# Patient Record
Sex: MALE | Race: WHITE | NOT HISPANIC OR LATINO | Employment: FULL TIME | URBAN - NONMETROPOLITAN AREA
[De-identification: names, ages, dates, MRNs, and addresses within clinical notes are randomized per-mention and may not be internally consistent; named-entity substitution may affect disease eponyms.]

---

## 2020-08-04 ENCOUNTER — HOSPITAL ENCOUNTER (INPATIENT)
Facility: HOSPITAL | Age: 55
LOS: 4 days | Discharge: HOME | DRG: 314 | End: 2020-08-08
Attending: EMERGENCY MEDICINE | Admitting: FAMILY MEDICINE

## 2020-08-04 ENCOUNTER — APPOINTMENT (EMERGENCY)
Dept: RADIOLOGY | Facility: HOSPITAL | Age: 55
DRG: 314 | End: 2020-08-04

## 2020-08-04 DIAGNOSIS — R79.89 ABNORMAL TSH: ICD-10-CM

## 2020-08-04 DIAGNOSIS — I50.23 ACUTE ON CHRONIC SYSTOLIC CONGESTIVE HEART FAILURE (HCC): ICD-10-CM

## 2020-08-04 DIAGNOSIS — R77.8 ELEVATED TROPONIN: ICD-10-CM

## 2020-08-04 DIAGNOSIS — N17.9 AKI (ACUTE KIDNEY INJURY) (HCC): ICD-10-CM

## 2020-08-04 DIAGNOSIS — R77.8 ELEVATED TROPONIN I LEVEL: ICD-10-CM

## 2020-08-04 DIAGNOSIS — G25.81 RESTLESS LEG: ICD-10-CM

## 2020-08-04 DIAGNOSIS — I50.21 ACUTE SYSTOLIC CONGESTIVE HEART FAILURE (HCC): Primary | ICD-10-CM

## 2020-08-04 DIAGNOSIS — I48.91 ATRIAL FIBRILLATION WITH RVR (HCC): ICD-10-CM

## 2020-08-04 DIAGNOSIS — I42.8 OTHER CARDIOMYOPATHY (HCC): ICD-10-CM

## 2020-08-04 PROBLEM — I42.9 CARDIOMYOPATHY (HCC): Status: ACTIVE | Noted: 2020-08-04

## 2020-08-04 LAB
ALBUMIN SERPL BCP-MCNC: 3.4 G/DL (ref 3.5–5)
ALP SERPL-CCNC: 175 U/L (ref 46–116)
ALT SERPL W P-5'-P-CCNC: 40 U/L (ref 12–78)
ANION GAP SERPL CALCULATED.3IONS-SCNC: 8 MMOL/L (ref 4–13)
APTT PPP: 27 SECONDS (ref 23–37)
AST SERPL W P-5'-P-CCNC: 27 U/L (ref 5–45)
ATRIAL RATE: 182 BPM
BASOPHILS # BLD AUTO: 0.09 THOUSANDS/ΜL (ref 0–0.1)
BASOPHILS NFR BLD AUTO: 1 % (ref 0–1)
BILIRUB SERPL-MCNC: 0.34 MG/DL (ref 0.2–1)
BUN SERPL-MCNC: 18 MG/DL (ref 5–25)
CALCIUM SERPL-MCNC: 8.6 MG/DL (ref 8.3–10.1)
CHLORIDE SERPL-SCNC: 105 MMOL/L (ref 100–108)
CHOLEST SERPL-MCNC: 151 MG/DL (ref 50–200)
CO2 SERPL-SCNC: 28 MMOL/L (ref 21–32)
CREAT SERPL-MCNC: 1.29 MG/DL (ref 0.6–1.3)
EOSINOPHIL # BLD AUTO: 0.62 THOUSAND/ΜL (ref 0–0.61)
EOSINOPHIL NFR BLD AUTO: 6 % (ref 0–6)
ERYTHROCYTE [DISTWIDTH] IN BLOOD BY AUTOMATED COUNT: 12.5 % (ref 11.6–15.1)
EST. AVERAGE GLUCOSE BLD GHB EST-MCNC: 111 MG/DL
GFR SERPL CREATININE-BSD FRML MDRD: 62 ML/MIN/1.73SQ M
GLUCOSE SERPL-MCNC: 76 MG/DL (ref 65–140)
HBA1C MFR BLD: 5.5 %
HCT VFR BLD AUTO: 49.3 % (ref 36.5–49.3)
HDLC SERPL-MCNC: 40 MG/DL
HGB BLD-MCNC: 15 G/DL (ref 12–17)
IMM GRANULOCYTES # BLD AUTO: 0.05 THOUSAND/UL (ref 0–0.2)
IMM GRANULOCYTES NFR BLD AUTO: 0 % (ref 0–2)
INR PPP: 1.12 (ref 0.84–1.19)
LACTATE SERPL-SCNC: 1 MMOL/L (ref 0.5–2)
LACTATE SERPL-SCNC: 2.3 MMOL/L (ref 0.5–2)
LDLC SERPL CALC-MCNC: 89 MG/DL (ref 0–100)
LIPASE SERPL-CCNC: 226 U/L (ref 73–393)
LYMPHOCYTES # BLD AUTO: 4.11 THOUSANDS/ΜL (ref 0.6–4.47)
LYMPHOCYTES NFR BLD AUTO: 36 % (ref 14–44)
MCH RBC QN AUTO: 27.4 PG (ref 26.8–34.3)
MCHC RBC AUTO-ENTMCNC: 30.4 G/DL (ref 31.4–37.4)
MCV RBC AUTO: 90 FL (ref 82–98)
MONOCYTES # BLD AUTO: 0.88 THOUSAND/ΜL (ref 0.17–1.22)
MONOCYTES NFR BLD AUTO: 8 % (ref 4–12)
NEUTROPHILS # BLD AUTO: 5.62 THOUSANDS/ΜL (ref 1.85–7.62)
NEUTS SEG NFR BLD AUTO: 49 % (ref 43–75)
NONHDLC SERPL-MCNC: 111 MG/DL
NRBC BLD AUTO-RTO: 0 /100 WBCS
NT-PROBNP SERPL-MCNC: 2786 PG/ML
PLATELET # BLD AUTO: 359 THOUSANDS/UL (ref 149–390)
PMV BLD AUTO: 10.7 FL (ref 8.9–12.7)
POTASSIUM SERPL-SCNC: 4.2 MMOL/L (ref 3.5–5.3)
PROT SERPL-MCNC: 7.6 G/DL (ref 6.4–8.2)
PROTHROMBIN TIME: 14.2 SECONDS (ref 11.6–14.5)
QRS AXIS: 82 DEGREES
QRSD INTERVAL: 84 MS
QT INTERVAL: 244 MS
QTC INTERVAL: 417 MS
RBC # BLD AUTO: 5.47 MILLION/UL (ref 3.88–5.62)
SODIUM SERPL-SCNC: 141 MMOL/L (ref 136–145)
T WAVE AXIS: 68 DEGREES
TRIGL SERPL-MCNC: 111 MG/DL
TROPONIN I SERPL-MCNC: 0.15 NG/ML
TSH SERPL DL<=0.05 MIU/L-ACNC: 0.15 UIU/ML (ref 0.36–3.74)
VENTRICULAR RATE: 176 BPM
WBC # BLD AUTO: 11.37 THOUSAND/UL (ref 4.31–10.16)

## 2020-08-04 PROCEDURE — 99291 CRITICAL CARE FIRST HOUR: CPT | Performed by: EMERGENCY MEDICINE

## 2020-08-04 PROCEDURE — 80061 LIPID PANEL: CPT | Performed by: FAMILY MEDICINE

## 2020-08-04 PROCEDURE — 71045 X-RAY EXAM CHEST 1 VIEW: CPT

## 2020-08-04 PROCEDURE — 84443 ASSAY THYROID STIM HORMONE: CPT | Performed by: FAMILY MEDICINE

## 2020-08-04 PROCEDURE — 96375 TX/PRO/DX INJ NEW DRUG ADDON: CPT

## 2020-08-04 PROCEDURE — 85025 COMPLETE CBC W/AUTO DIFF WBC: CPT | Performed by: EMERGENCY MEDICINE

## 2020-08-04 PROCEDURE — 84484 ASSAY OF TROPONIN QUANT: CPT | Performed by: EMERGENCY MEDICINE

## 2020-08-04 PROCEDURE — 96365 THER/PROPH/DIAG IV INF INIT: CPT

## 2020-08-04 PROCEDURE — 85610 PROTHROMBIN TIME: CPT | Performed by: EMERGENCY MEDICINE

## 2020-08-04 PROCEDURE — 83880 ASSAY OF NATRIURETIC PEPTIDE: CPT | Performed by: EMERGENCY MEDICINE

## 2020-08-04 PROCEDURE — 93005 ELECTROCARDIOGRAM TRACING: CPT

## 2020-08-04 PROCEDURE — 80053 COMPREHEN METABOLIC PANEL: CPT | Performed by: EMERGENCY MEDICINE

## 2020-08-04 PROCEDURE — 96374 THER/PROPH/DIAG INJ IV PUSH: CPT

## 2020-08-04 PROCEDURE — 83605 ASSAY OF LACTIC ACID: CPT | Performed by: EMERGENCY MEDICINE

## 2020-08-04 PROCEDURE — 93010 ELECTROCARDIOGRAM REPORT: CPT | Performed by: INTERNAL MEDICINE

## 2020-08-04 PROCEDURE — 85730 THROMBOPLASTIN TIME PARTIAL: CPT | Performed by: EMERGENCY MEDICINE

## 2020-08-04 PROCEDURE — 99223 1ST HOSP IP/OBS HIGH 75: CPT | Performed by: FAMILY MEDICINE

## 2020-08-04 PROCEDURE — 94760 N-INVAS EAR/PLS OXIMETRY 1: CPT

## 2020-08-04 PROCEDURE — 96376 TX/PRO/DX INJ SAME DRUG ADON: CPT

## 2020-08-04 PROCEDURE — 99285 EMERGENCY DEPT VISIT HI MDM: CPT

## 2020-08-04 PROCEDURE — 36415 COLL VENOUS BLD VENIPUNCTURE: CPT | Performed by: EMERGENCY MEDICINE

## 2020-08-04 PROCEDURE — 83690 ASSAY OF LIPASE: CPT | Performed by: EMERGENCY MEDICINE

## 2020-08-04 PROCEDURE — 83036 HEMOGLOBIN GLYCOSYLATED A1C: CPT | Performed by: FAMILY MEDICINE

## 2020-08-04 PROCEDURE — 84484 ASSAY OF TROPONIN QUANT: CPT | Performed by: FAMILY MEDICINE

## 2020-08-04 RX ORDER — DIGOXIN 0.25 MG/ML
250 INJECTION INTRAMUSCULAR; INTRAVENOUS ONCE
Status: COMPLETED | OUTPATIENT
Start: 2020-08-04 | End: 2020-08-04

## 2020-08-04 RX ORDER — MAGNESIUM HYDROXIDE/ALUMINUM HYDROXICE/SIMETHICONE 120; 1200; 1200 MG/30ML; MG/30ML; MG/30ML
30 SUSPENSION ORAL EVERY 6 HOURS PRN
Status: DISCONTINUED | OUTPATIENT
Start: 2020-08-04 | End: 2020-08-08 | Stop reason: HOSPADM

## 2020-08-04 RX ORDER — METOPROLOL TARTRATE 50 MG/1
50 TABLET, FILM COATED ORAL EVERY 12 HOURS SCHEDULED
Status: DISCONTINUED | OUTPATIENT
Start: 2020-08-04 | End: 2020-08-05

## 2020-08-04 RX ORDER — METOPROLOL TARTRATE 5 MG/5ML
5 INJECTION INTRAVENOUS
Status: DISCONTINUED | OUTPATIENT
Start: 2020-08-04 | End: 2020-08-04

## 2020-08-04 RX ORDER — DILTIAZEM HYDROCHLORIDE 5 MG/ML
20 INJECTION INTRAVENOUS ONCE
Status: COMPLETED | OUTPATIENT
Start: 2020-08-04 | End: 2020-08-04

## 2020-08-04 RX ORDER — CALCIUM CARBONATE 200(500)MG
1000 TABLET,CHEWABLE ORAL DAILY PRN
Status: DISCONTINUED | OUTPATIENT
Start: 2020-08-04 | End: 2020-08-08 | Stop reason: HOSPADM

## 2020-08-04 RX ORDER — METOPROLOL TARTRATE 5 MG/5ML
5 INJECTION INTRAVENOUS ONCE
Status: COMPLETED | OUTPATIENT
Start: 2020-08-04 | End: 2020-08-04

## 2020-08-04 RX ORDER — ATORVASTATIN CALCIUM 40 MG/1
40 TABLET, FILM COATED ORAL
Status: DISCONTINUED | OUTPATIENT
Start: 2020-08-04 | End: 2020-08-08 | Stop reason: HOSPADM

## 2020-08-04 RX ORDER — FUROSEMIDE 10 MG/ML
40 INJECTION INTRAMUSCULAR; INTRAVENOUS
Status: DISCONTINUED | OUTPATIENT
Start: 2020-08-05 | End: 2020-08-06

## 2020-08-04 RX ORDER — FUROSEMIDE 10 MG/ML
40 INJECTION INTRAMUSCULAR; INTRAVENOUS ONCE
Status: COMPLETED | OUTPATIENT
Start: 2020-08-04 | End: 2020-08-04

## 2020-08-04 RX ORDER — ASPIRIN 81 MG/1
81 TABLET ORAL DAILY
Status: DISCONTINUED | OUTPATIENT
Start: 2020-08-05 | End: 2020-08-06

## 2020-08-04 RX ORDER — DOCUSATE SODIUM 100 MG/1
100 CAPSULE, LIQUID FILLED ORAL 2 TIMES DAILY
Status: DISCONTINUED | OUTPATIENT
Start: 2020-08-04 | End: 2020-08-08 | Stop reason: HOSPADM

## 2020-08-04 RX ORDER — DILTIAZEM HYDROCHLORIDE 5 MG/ML
15 INJECTION INTRAVENOUS ONCE
Status: COMPLETED | OUTPATIENT
Start: 2020-08-04 | End: 2020-08-04

## 2020-08-04 RX ORDER — ACETAMINOPHEN 325 MG/1
650 TABLET ORAL EVERY 6 HOURS PRN
Status: DISCONTINUED | OUTPATIENT
Start: 2020-08-04 | End: 2020-08-08 | Stop reason: HOSPADM

## 2020-08-04 RX ORDER — METOPROLOL TARTRATE 5 MG/5ML
5 INJECTION INTRAVENOUS EVERY 6 HOURS PRN
Status: DISCONTINUED | OUTPATIENT
Start: 2020-08-04 | End: 2020-08-08 | Stop reason: HOSPADM

## 2020-08-04 RX ORDER — LISINOPRIL 5 MG/1
5 TABLET ORAL DAILY
Status: DISCONTINUED | OUTPATIENT
Start: 2020-08-05 | End: 2020-08-06

## 2020-08-04 RX ORDER — ONDANSETRON 2 MG/ML
4 INJECTION INTRAMUSCULAR; INTRAVENOUS EVERY 6 HOURS PRN
Status: DISCONTINUED | OUTPATIENT
Start: 2020-08-04 | End: 2020-08-08 | Stop reason: HOSPADM

## 2020-08-04 RX ADMIN — DIGOXIN 250 MCG: 0.25 INJECTION INTRAMUSCULAR; INTRAVENOUS at 15:12

## 2020-08-04 RX ADMIN — DILTIAZEM HYDROCHLORIDE 20 MG: 5 INJECTION INTRAVENOUS at 13:24

## 2020-08-04 RX ADMIN — METOROPROLOL TARTRATE 5 MG: 5 INJECTION, SOLUTION INTRAVENOUS at 14:56

## 2020-08-04 RX ADMIN — METOPROLOL TARTRATE 5 MG: 5 INJECTION INTRAVENOUS at 17:35

## 2020-08-04 RX ADMIN — DIGOXIN 250 MCG: 0.25 INJECTION INTRAMUSCULAR; INTRAVENOUS at 13:47

## 2020-08-04 RX ADMIN — ATORVASTATIN CALCIUM 40 MG: 40 TABLET, FILM COATED ORAL at 17:23

## 2020-08-04 RX ADMIN — METOPROLOL TARTRATE 50 MG: 50 TABLET, FILM COATED ORAL at 17:45

## 2020-08-04 RX ADMIN — METOROPROLOL TARTRATE 5 MG: 5 INJECTION, SOLUTION INTRAVENOUS at 14:27

## 2020-08-04 RX ADMIN — DILTIAZEM HYDROCHLORIDE 30 MG: 30 TABLET, FILM COATED ORAL at 23:35

## 2020-08-04 RX ADMIN — DILTIAZEM HYDROCHLORIDE 15 MG: 5 INJECTION INTRAVENOUS at 13:44

## 2020-08-04 RX ADMIN — Medication 400 MG: at 17:23

## 2020-08-04 RX ADMIN — FUROSEMIDE 40 MG: 10 INJECTION, SOLUTION INTRAMUSCULAR; INTRAVENOUS at 13:59

## 2020-08-04 RX ADMIN — DILTIAZEM HYDROCHLORIDE 5 MG/HR: 5 INJECTION INTRAVENOUS at 14:00

## 2020-08-04 RX ADMIN — APIXABAN 5 MG: 5 TABLET, FILM COATED ORAL at 17:23

## 2020-08-04 NOTE — ASSESSMENT & PLAN NOTE
Most likely secondary to tachyarrhythmia related  Previous echocardiogram shows: Moderate spontaneous echo contrast seen in LA appendage  Echogeneic structure seen in appendage concerning for MOLLY thrombus  Left ventricle is mildly dilated  Severely reduced left ventricular systolic function  Global     hypokinesis  Left ventricular ejection fraction is 20%  Severely dilated left atrium  Severely dilated right atrium  Enlarged right ventricular size  Reduced right ventricular systolic function        Follow cardiology consult  Continue beta-blocker, aspirin, statin, lisinopril  Follow echo cardiac

## 2020-08-04 NOTE — RESPIRATORY THERAPY NOTE
RT Protocol Note  Lizbeth Mcguire 63 y o  male MRN: [id number]  Unit/Bed#: -01 Encounter: [id number]    Assessment    Principal Problem:    Atrial fibrillation with RVR ([place] [address] )  Active Problems:    Elevated troponin    Acute on chronic systolic congestive heart failure (HCC)    Cardiomyopathy ([place] [address] )      Home Pulmonary Medications:    Home Devices/Therapy: Other (Comment)(NONE)    Past Medical History:   Diagnosis Date    Atrial fibrillation ([place] [address] )      Social History     Socioeconomic History    Marital status: Single     Spouse name: None    Number of children: None    Years of education: None    Highest education level: None   Occupational History    None   Social Needs    Financial resource strain: None    Food insecurity     Worry: None     Inability: None    Transportation needs     Medical: None     Non-medical: None   Tobacco Use    Smoking status: Former Smoker    Smokeless tobacco: Never Used   Substance and Sexual Activity    Alcohol use:  Yes     Alcohol/week: 5 0 standard drinks     Types: 5 Standard drinks or equivalent per week     Frequency: 4 or more times a week     Drinks per session: 3 or 4     Binge frequency: Never     Comment: occassionally    Drug use: Never    Sexual activity: None   Lifestyle    Physical activity     Days per week: None     Minutes per session: None    Stress: None   Relationships    Social connections     Talks on phone: None     Gets together: None     Attends Church service: None     Active member of club or organization: None     Attends meetings of clubs or organizations: None     Relationship status: None    Intimate partner violence     Fear of current or ex partner: None     Emotionally abused: None     Physically abused: None     Forced sexual activity: None   Other Topics Concern    None   Social History Narrative    None       Subjective         Objective    Physical Exam:   Assessment Type: Assess only  General Appearance: Alert, Awake  Respiratory Pattern: Normal  Chest Assessment: Chest expansion symmetrical  Bilateral Breath Sounds: Clear    Vitals:  Blood pressure 131/86, pulse (!) 110, temperature 97 7 °F (36 5 °C), resp  rate 20, height 6' 1", weight 94 8 kg (208 lb 15 9 oz), SpO2 96 %  Imaging and other studies: I have personally reviewed pertinent reports  Plan    Respiratory Plan: No distress/Pulmonary history        Resp Comments: Pt takes no resp meds at home  Denies SOB at present   No pulm hx

## 2020-08-04 NOTE — ASSESSMENT & PLAN NOTE
Present on admission  Patient received digoxin 250 mcgm X 2, Lopressor 5 mg x 2, Cardizem 15 mg, then 20 mg IV  Patient was also placed on Cardizem drip which discontinued  Currently rate controlled  Continue metoprolol 25 mg p o  B i d  Lopressor IV as needed  Continue aspirin, Eliquis  As per chart review,Hx of Afib s/p cardioversion; Hx of LA thrombus   Patient was admitted to [facility] in 1/2018 for this where he was found to be in Afib with RVR  He did have an echo that demonstrated LA thrombus so no cardioversion was performed initially  he was rate controlled and anticoagulated with eliquis  Subsequent KAYLYN on 3/2018 demonstrated no thormbus so he was successful cardioverted  [id number]: Moderate spontaneous echo contrast seen in LA appendage  Echogeneic structure seen in appendage concerning for MOLLY thrombus  Left ventricle is mildly dilated  Severely reduced left ventricular systolic function  Global     hypokinesis  Left ventricular ejection fraction is 20%  Severely dilated left atrium  Severely dilated right atrium  Enlarged right ventricular size  Reduced right ventricular systolic function        Follow repeat echo  Monitor on telemetry  Follow cardiology consult  Maintain potassium more than 4, magnesium more than 2

## 2020-08-04 NOTE — ED PROVIDER NOTES
History  Chief Complaint   Patient presents with    Shortness of Breath     pt c/o increased sob w/intermittent chest pain,dizziness, and ankle swelling for past week  pt states he fell into  drain a week ago and felt sx started at that time  denies travel/n/v/d/fevers/cough     Patient has a history of atrial fibrillation  Has not been taking his medication for the past few months secondary to insurance issues  States he fell last week  Has been getting progressively short of breath since then  Has dyspnea on exertion  Has some orthopnea and PND  Was seen by his family doctor today and noted to be in atrial fib with a rate of 170s  Sent here for further evaluation  Has been having intermittent chest pain for the past 1 week  No pain at present  No history of PE or DVT  Patient states has been cardioverted in the past   Patient states he has been cardioverted in the past   However, he had an echo done before cardioversion and states there was a clot there  Had to be on a blood thinner for some time before cardioversion could be done  History provided by:  Patient   used: No    Shortness of Breath   Severity:  Mild  Onset quality:  Gradual  Duration:  1 week  Timing:  Constant  Progression:  Worsening  Chronicity:  New  Context: not emotional upset, not occupational exposure, not pollens, not strong odors and not URI    Relieved by:  Rest  Worsened by:  Exertion and movement  Ineffective treatments:  None tried  Associated symptoms: chest pain    Associated symptoms: no abdominal pain, no cough, no diaphoresis, no ear pain, no fever, no headaches, no hemoptysis, no neck pain, no rash, no sore throat, no sputum production, no syncope, no swollen glands, no vomiting and no wheezing        None       History reviewed  No pertinent past medical history  History reviewed  No pertinent surgical history  History reviewed  No pertinent family history    I have reviewed and agree with the history as documented  E-Cigarette/Vaping    E-Cigarette Use Never User      E-Cigarette/Vaping Substances     Social History     Tobacco Use    Smoking status: Former Smoker    Smokeless tobacco: Never Used   Substance Use Topics    Alcohol use: Yes     Frequency: Monthly or less     Drinks per session: 1 or 2     Binge frequency: Never     Comment: occassionally    Drug use: Never       Review of Systems   Constitutional: Negative for chills, diaphoresis and fever  HENT: Negative for ear pain, hearing loss, sore throat, trouble swallowing and voice change  Eyes: Negative for pain and discharge  Respiratory: Positive for shortness of breath  Negative for cough, hemoptysis, sputum production and wheezing  Cardiovascular: Positive for chest pain  Negative for palpitations and syncope  Gastrointestinal: Negative for abdominal pain, blood in stool, constipation, diarrhea, nausea and vomiting  Genitourinary: Negative for dysuria, flank pain, frequency and hematuria  Musculoskeletal: Negative for joint swelling, neck pain and neck stiffness  Skin: Negative for rash and wound  Neurological: Negative for dizziness, seizures, syncope, facial asymmetry and headaches  Psychiatric/Behavioral: Negative for hallucinations, self-injury and suicidal ideas  All other systems reviewed and are negative  Physical Exam  Physical Exam  Vitals signs and nursing note reviewed  Constitutional:       General: He is not in acute distress  Appearance: He is well-developed  HENT:      Head: Normocephalic and atraumatic  Right Ear: External ear normal       Left Ear: External ear normal    Eyes:      Conjunctiva/sclera: Conjunctivae normal       Pupils: Pupils are equal, round, and reactive to light  Neck:      Musculoskeletal: Normal range of motion and neck supple  Cardiovascular:      Heart sounds: No murmur        Comments: Tachycardic and irregular  Pulmonary:      Effort: Respiratory distress (Mild distress) present  Breath sounds: Normal breath sounds  No wheezing or rales  Abdominal:      General: Bowel sounds are normal  There is no distension  Palpations: Abdomen is soft  Tenderness: There is no abdominal tenderness  There is no guarding or rebound  Musculoskeletal: Normal range of motion  General: No deformity  Comments: Minimal to trace lower extremity edema  Skin:     General: Skin is warm and dry  Findings: No rash  Neurological:      Mental Status: He is alert and oriented to person, place, and time  Cranial Nerves: No cranial nerve deficit     Psychiatric:         Behavior: Behavior normal          Vital Signs  ED Triage Vitals [08/04/20 1316]   Temperature Pulse Respirations Blood Pressure SpO2   (!) 97 2 °F (36 2 °C) (!) 181 (!) 25 139/100 99 %      Temp Source Heart Rate Source Patient Position - Orthostatic VS BP Location FiO2 (%)   Temporal Monitor Lying Left arm --      Pain Score       --           Vitals:    08/04/20 1430 08/04/20 1445 08/04/20 1500 08/04/20 1515   BP: 123/92 116/88 104/84 109/82   Pulse: (!) 117 104 94 89   Patient Position - Orthostatic VS:             Visual Acuity      ED Medications  Medications   diltiazem (CARDIZEM) injection 20 mg (20 mg Intravenous Given 8/4/20 1324)   diltiazem (CARDIZEM) injection 15 mg (15 mg Intravenous Given 8/4/20 1344)   diltiazem (CARDIZEM) 125 mg in sodium chloride 0 9 % 125 mL infusion (0 mg/hr Intravenous Stopped 8/4/20 1436)   digoxin (LANOXIN) injection 250 mcg (250 mcg Intravenous Given 8/4/20 1347)   furosemide (LASIX) injection 40 mg (40 mg Intravenous Given 8/4/20 1359)   metoprolol (LOPRESSOR) injection 5 mg (5 mg Intravenous Given 8/4/20 1427)   metoprolol (LOPRESSOR) injection 5 mg (5 mg Intravenous Given 8/4/20 1456)   digoxin (LANOXIN) injection 250 mcg (250 mcg Intravenous Given 8/4/20 1512)       Diagnostic Studies  Results Reviewed     Procedure Component Value Units Date/Time    Lactic acid [[id number]]  (Abnormal) Collected:  08/04/20 1323    Lab Status:  Final result Specimen:  Blood from Arm, Right Updated:  08/04/20 1402     LACTIC ACID 2 3 mmol/L     Narrative:       Result may be elevated if tourniquet was used during collection      Lactic acid 2 Hours [[id number]]     Lab Status:  No result Specimen:  Blood     Troponin I [[id number]]  (Abnormal) Collected:  08/04/20 1323    Lab Status:  Final result Specimen:  Blood from Arm, Right Updated:  08/04/20 1402     Troponin I 0 15 ng/mL     Comprehensive metabolic panel [[id number]]  (Abnormal) Collected:  08/04/20 1323    Lab Status:  Final result Specimen:  Blood from Arm, Right Updated:  08/04/20 1354     Sodium 141 mmol/L      Potassium 4 2 mmol/L      Chloride 105 mmol/L      CO2 28 mmol/L      ANION GAP 8 mmol/L      BUN 18 mg/dL      Creatinine 1 29 mg/dL      Glucose 76 mg/dL      Calcium 8 6 mg/dL      AST 27 U/L      ALT 40 U/L      Alkaline Phosphatase 175 U/L      Total Protein 7 6 g/dL      Albumin 3 4 g/dL      Total Bilirubin 0 34 mg/dL      eGFR 62 ml/min/1 73sq m     Narrative:       [place] guidelines for Chronic Kidney Disease (CKD):     Stage 1 with normal or high GFR (GFR > 90 mL/min/1 73 square meters)    Stage 2 Mild CKD (GFR = 60-89 mL/min/1 73 square meters)    Stage 3A Moderate CKD (GFR = 45-59 mL/min/1 73 square meters)    Stage 3B Moderate CKD (GFR = 30-44 mL/min/1 73 square meters)    Stage 4 Severe CKD (GFR = 15-29 mL/min/1 73 square meters)    Stage 5 End Stage CKD (GFR <15 mL/min/1 73 square meters)  Note: GFR calculation is accurate only with a steady state creatinine    Lipase [[id number]]  (Normal) Collected:  08/04/20 1323    Lab Status:  Final result Specimen:  Blood from Arm, Right Updated:  08/04/20 1354     Lipase 226 u/L     NT-BNP PRO [[id number]]  (Abnormal) Collected:  08/04/20 1323    Lab Status:  Final result Specimen:  Blood from Arm, Right Updated:  08/04/20 1354     NT-proBNP 2,786 pg/mL     Protime-INR [[id number]]  (Normal) Collected:  08/04/20 1323    Lab Status:  Final result Specimen:  Blood from Arm, Right Updated:  08/04/20 1344     Protime 14 2 seconds      INR 1 12    APTT [[id number]]  (Normal) Collected:  08/04/20 1323    Lab Status:  Final result Specimen:  Blood from Arm, Right Updated:  08/04/20 1344     PTT 27 seconds     CBC and differential [[id number]]  (Abnormal) Collected:  08/04/20 1323    Lab Status:  Final result Specimen:  Blood from Arm, Right Updated:  08/04/20 1331     WBC 11 37 Thousand/uL      RBC 5 47 Million/uL      Hemoglobin 15 0 g/dL      Hematocrit 49 3 %      MCV 90 fL      MCH 27 4 pg      MCHC 30 4 g/dL      RDW 12 5 %      MPV 10 7 fL      Platelets 939 Thousands/uL      nRBC 0 /100 WBCs      Neutrophils Relative 49 %      Immat GRANS % 0 %      Lymphocytes Relative 36 %      Monocytes Relative 8 %      Eosinophils Relative 6 %      Basophils Relative 1 %      Neutrophils Absolute 5 62 Thousands/µL      Immature Grans Absolute 0 05 Thousand/uL      Lymphocytes Absolute 4 11 Thousands/µL      Monocytes Absolute 0 88 Thousand/µL      Eosinophils Absolute 0 62 Thousand/µL      Basophils Absolute 0 09 Thousands/µL                  XR chest 1 view portable   Final Result by Arty Dandy, MD (08/04 1437)      No acute cardiopulmonary disease              Workstation performed: [id number]                    Procedures  ECG 12 Lead Documentation Only    Date/Time: 8/4/2020 1:21 PM  Performed by: Zari Lunsford MD  Authorized by: Zari Lunsford MD     ECG reviewed by me, the ED Provider: yes    Patient location:  ED  Previous ECG:     Previous ECG:  Unavailable  Rate:     ECG rate:  170  Rhythm:     Rhythm: atrial flutter      CriticalCare Time  Performed by: Zari Lunsford MD  Authorized by: Zari Lunsford MD     Critical care provider statement:     Critical care time (minutes):  35    Critical care time was exclusive of:  Separately billable procedures and treating other patients    Critical care was necessary to treat or prevent imminent or life-threatening deterioration of the following conditions:  Cardiac failure and circulatory failure    Critical care was time spent personally by me on the following activities:  Obtaining history from patient or surrogate, development of treatment plan with patient or surrogate, evaluation of patient's response to treatment, review of old charts, re-evaluation of patient's condition, ordering and review of laboratory studies, ordering and review of radiographic studies and ordering and performing treatments and interventions             ED Course  ED Course as of Aug 04 1534   Tue Aug 04, 2020   99 Koby Cohn Tyler text did critical care  Recommends giving IV Lopressor and trying to wean off Cardizem drip  1435 Heart rate now down to  after IV Lopressor  We will turn off Cardizem drip  US AUDIT      Most Recent Value   Initial Alcohol Screen: US AUDIT-C    1  How often do you have a drink containing alcohol? 2 Filed at: 08/04/2020 1319   2  How many drinks containing alcohol do you have on a typical day you are drinking? 1 Filed at: 08/04/2020 1319   3a  Male UNDER 65: How often do you have five or more drinks on one occasion? 0 Filed at: 08/04/2020 1319   Audit-C Score  3 Filed at: 08/04/2020 1319                  NAVEEN/DAST-10      Most Recent Value   How many times in the past year have you    Used an illegal drug or used a prescription medication for non-medical reasons?   Never Filed at: 08/04/2020 1319                                MDM  Number of Diagnoses or Management Options     Amount and/or Complexity of Data Reviewed  Clinical lab tests: reviewed  Review and summarize past medical records: yes          Disposition  Final diagnoses:   Acute systolic congestive heart failure (HCC)   Atrial fibrillation with RVR (HCC)   Elevated troponin I level Time reflects when diagnosis was documented in both MDM as applicable and the Disposition within this note     Time User Action Codes Description Comment    8/4/2020  1:58 PM Garland Miramontes [M41 13] Acute systolic congestive heart failure ([facility] [address] )     8/4/2020  2:06 PM Garland Miramontes [I48 91] Atrial fibrillation with RVR ([facility] [address] )     8/4/2020  2:06 PM Garland Miramontes [R79 89] Elevated troponin I level       ED Disposition     ED Disposition Condition Date/Time Comment    Admit Stable Tue Aug 4, 2020  3:33 PM Case was discussed with Dr Ernesto Aems and the patient's admission status was agreed to be  to the service of Dr Ernesto Olivera    None         Patient's Medications    No medications on file     No discharge procedures on file      PDMP Review     None          ED Provider  Electronically Signed by           Judith Saini MD  08/04/20 3776

## 2020-08-04 NOTE — H&P
H&P- Joshua Strauss 1965, 63 y o  male MRN: [id number]    Unit/Bed#: -01 Encounter: [id number]    Primary Care Provider: Robles Vázquez DO   Date and time admitted to hospital: 8/4/2020  1:11 PM        Elevated troponin  Assessment & Plan  Most likely is demand secondary to AFib with RVR  No significant EKG change regarding ST-T elevation-patient does not complain any chest pain  Trend troponin    * Atrial fibrillation with RVR ([address] )  Assessment & Plan  Present on admission  Patient received digoxin 250 mcgm X 2, Lopressor 5 mg x 2, Cardizem 15 mg, then 20 mg IV  Patient was also placed on Cardizem drip which discontinued  Currently rate controlled  Continue metoprolol 25 mg p o  B i d  Lopressor IV as needed  Continue aspirin, Eliquis  As per chart review,Hx of Afib s/p cardioversion; Hx of LA thrombus   Patient was admitted to [facility] THE [facility] in 1/2018 for this where he was found to be in Afib with RVR  He did have an echo that demonstrated LA thrombus so no cardioversion was performed initially  he was rate controlled and anticoagulated with eliquis  Subsequent KAYLYN on 3/2018 demonstrated no thormbus so he was successful cardioverted  [id number]: Moderate spontaneous echo contrast seen in LA appendage  Echogeneic structure seen in appendage concerning for MOLLY thrombus  Left ventricle is mildly dilated  Severely reduced left ventricular systolic function  Global     hypokinesis  Left ventricular ejection fraction is 20%  Severely dilated left atrium  Severely dilated right atrium  Enlarged right ventricular size  Reduced right ventricular systolic function        Follow repeat echo  Monitor on telemetry  Follow cardiology consult  Maintain potassium more than 4, magnesium more than 2    Acute on chronic systolic congestive heart failure (HCC)  Assessment & Plan  Wt Readings from Last 3 Encounters:   08/04/20 94 8 kg (208 lb 15 9 oz)     Strict intake and output  Low-sodium diet  Continue Lasix, beta-blocker, aspirin  Follow repeat echocardiogram, follow cardiology consult  Previous echocardiogram in 2018: Moderate spontaneous echo contrast seen in LA appendage  Echogeneic structure seen in appendage concerning for MOLLY thrombus  Left ventricle is mildly dilated  Severely reduced left ventricular systolic function  Global     hypokinesis  Left ventricular ejection fraction is 20%  Severely dilated left atrium  Severely dilated right atrium  Enlarged right ventricular size  Reduced right ventricular systolic function            Cardiomyopathy [facility])  Assessment & Plan  Most likely secondary to tachyarrhythmia related  Previous echocardiogram shows: Moderate spontaneous echo contrast seen in LA appendage  Echogeneic structure seen in appendage concerning for MOLLY thrombus  Left ventricle is mildly dilated  Severely reduced left ventricular systolic function  Global     hypokinesis  Left ventricular ejection fraction is 20%  Severely dilated left atrium  Severely dilated right atrium  Enlarged right ventricular size  Reduced right ventricular systolic function        Follow cardiology consult  Continue beta-blocker, aspirin, statin, lisinopril  Follow echo cardiac        VTE Prophylaxis: Apixaban (Eliquis)  / sequential compression device   Code Status:  Full code  POLST: POLST is not applicable to this patient  Discussion with family:  None    Anticipated Length of Stay:  Patient will be admitted on an Inpatient basis with an anticipated length of stay of  > 2 midnights  Justification for Hospital Stay:  To monitor above condition    Total Time for Visit, including Counseling / Coordination of Care: 45 minutes  Greater than 50% of this total time spent on direct patient counseling and coordination of care  Chief Complaint:   Palpitation    History of Present Illness:    Gibson Bahena is a 63 y o  male who presents with palpitation    Patient reports 1 week ago he fell and then he went to his occupational therapist and later on he was visiting his PCP today when he was found that he has fast heart rate  Patient denies any chest pain, dizziness, lightheadedness, nausea, vomiting, sweating  Denies any smoking or drinking history  Denies any recent URI  But patient reports he had history of AFib, and he was taking some medication including Eliquis, amiodarone 200 mg daily, metoprolol and statin-for the last couple of months, he was not taking any medication due to loss of insurance  Patient does confess that he feels short of breath with taking stairs  Denies any leg swelling  As per chart review, patient did has history of AFib in 2018, status post cardioversion and was on Eliquis  Patient also has history of cardiomyopathy most likely tachyarrhythmia induced with ejection fraction was 20% at that time  Review of Systems:    Review of Systems   Constitutional: Positive for activity change  Negative for appetite change, chills, diaphoresis, fatigue, fever and unexpected weight change  HENT: Negative for congestion, dental problem, sinus pain, sneezing and voice change  Eyes: Negative for photophobia, redness and visual disturbance  Respiratory: Positive for shortness of breath  Negative for apnea, cough, choking, chest tightness and stridor  Cardiovascular: Positive for palpitations  Negative for chest pain  Gastrointestinal: Negative for abdominal distention, abdominal pain, anal bleeding, diarrhea, nausea and rectal pain  Genitourinary: Negative for difficulty urinating, dysuria, genital sores and hematuria  Musculoskeletal: Negative for arthralgias, back pain and gait problem  Skin: Negative for color change and pallor  Neurological: Negative for dizziness, seizures, facial asymmetry, light-headedness, numbness and headaches  Hematological: Negative for adenopathy  Psychiatric/Behavioral: Negative for agitation, behavioral problems and confusion     All other systems reviewed and are negative  Past Medical and Surgical History:     Past Medical History:   Diagnosis Date    Atrial fibrillation [facility])        History reviewed  No pertinent surgical history  Meds/Allergies:    Prior to Admission medications    Not on File     I have reviewed home medications with patient personally  Allergies: No Known Allergies    Social History:     Marital Status: Single   Occupation:  Self-employed  Patient Pre-hospital Living Situation:  At home  Patient Pre-hospital Level of Mobility:  Independent  Patient Pre-hospital Diet Restrictions:  No restriction  Substance Use History:   Social History     Substance and Sexual Activity   Alcohol Use Yes    Alcohol/week: 5 0 standard drinks    Types: 5 Standard drinks or equivalent per week    Frequency: 4 or more times a week    Drinks per session: 3 or 4    Binge frequency: Never    Comment: occassionally     Social History     Tobacco Use   Smoking Status Former Smoker   Smokeless Tobacco Never Used     Social History     Substance and Sexual Activity   Drug Use Never       Family History:    non-contributory    Physical Exam:     Vitals:   Blood Pressure: 136/82 (08/04/20 1606)  Pulse: 68 (08/04/20 1606)  Temperature: 97 7 °F (36 5 °C) (08/04/20 1606)  Temp Source: Temporal (08/04/20 1316)  Respirations: 20 (08/04/20 1606)  Height: 6' 1" (08/04/20 1316)  Weight - Scale: 94 8 kg (208 lb 15 9 oz) (08/04/20 1644)  SpO2: 98 % (08/04/20 1606)    Physical Exam   Constitutional: He is oriented to person, place, and time  Eyes: Pupils are equal, round, and reactive to light  Conjunctivae are normal  No scleral icterus  Neck: Normal range of motion  Neck supple  Carotid bruit is not present  Cardiovascular: S1 normal, S2 normal and normal heart sounds  An irregularly irregular rhythm present  Tachycardia present  Exam reveals no gallop  Pulmonary/Chest: Effort normal and breath sounds normal  No respiratory distress   He has no wheezes  He has no rales  Abdominal: Normal appearance and bowel sounds are normal    Musculoskeletal: Normal range of motion  General: No swelling  Neurological: He is alert and oriented to person, place, and time  Skin: Skin is warm and dry  Capillary refill takes less than 2 seconds  He is not diaphoretic  Psychiatric: Mood normal    Nursing note and vitals reviewed  Additional Data:     Lab Results: I have personally reviewed pertinent reports  Results from last 7 days   Lab Units 08/04/20  1323   WBC Thousand/uL 11 37*   HEMOGLOBIN g/dL 15 0   HEMATOCRIT % 49 3   PLATELETS Thousands/uL 359   NEUTROS PCT % 49   LYMPHS PCT % 36   MONOS PCT % 8   EOS PCT % 6     Results from last 7 days   Lab Units 08/04/20  1323   SODIUM mmol/L 141   POTASSIUM mmol/L 4 2   CHLORIDE mmol/L 105   CO2 mmol/L 28   BUN mg/dL 18   CREATININE mg/dL 1 29   ANION GAP mmol/L 8   CALCIUM mg/dL 8 6   ALBUMIN g/dL 3 4*   TOTAL BILIRUBIN mg/dL 0 34   ALK PHOS U/L 175*   ALT U/L 40   AST U/L 27   GLUCOSE RANDOM mg/dL 76     Results from last 7 days   Lab Units 08/04/20  1323   INR  1 12             Results from last 7 days   Lab Units 08/04/20  1323   LACTIC ACID mmol/L 2 3*       Imaging: I have personally reviewed pertinent reports  XR chest 1 view portable   Final Result by Suzann Babinski, MD (08/04 3667)      No acute cardiopulmonary disease  Workstation performed: [id number]             EKG, Pathology, and Other Studies Reviewed on Admission:   · EKG:  Atrial fibrillation with RVR  Nonspecific T change  No axis deviation, no ST elevation    Allscripts / Epic Records Reviewed: Yes     ** Please Note: This note has been constructed using a voice recognition system   **

## 2020-08-04 NOTE — ASSESSMENT & PLAN NOTE
Most likely is demand secondary to AFib with RVR  No significant EKG change regarding ST-T elevation-patient does not complain any chest pain  Trend troponin

## 2020-08-04 NOTE — LETTER
[facility]  [address]  [facility] [address]  Dept: [phone]    August 8, 2020     Patient: Joshua Strauss   YOB: 1965   Date of Visit: 8/4/2020       To Whom it May Concern:    Alfredo Crowe is under my professional care  He was seen in the hospital from 8/4/2020   to 08/08/20  He may return to work on 8/10/20 with the following limitations No heavy lifting (not more than 10 lb at a time), no strenuous exercise-till cleared by PCP or cardiologist     If you have any questions or concerns, please don't hesitate to call           Sincerely,          Hair Levy MD

## 2020-08-04 NOTE — PLAN OF CARE
Problem: Potential for Falls  Goal: Patient will remain free of falls  Description: INTERVENTIONS:  - Assess patient frequently for physical needs  -  Identify cognitive and physical deficits and behaviors that affect risk of falls    -  [employer] fall precautions as indicated by assessment   - Educate patient/family on patient safety including physical limitations  - Instruct patient to call for assistance with activity based on assessment  - Modify environment to reduce risk of injury  - Consider OT/PT consult to assist with strengthening/mobility  Outcome: Progressing     Problem: CARDIOVASCULAR - ADULT  Goal: Maintains optimal cardiac output and hemodynamic stability  Description: INTERVENTIONS:  - Monitor I/O, vital signs and rhythm  - Monitor for S/S and trends of decreased cardiac output  - Administer and titrate ordered vasoactive medications to optimize hemodynamic stability  - Assess quality of pulses, skin color and temperature  - Assess for signs of decreased coronary artery perfusion  - Instruct patient to report change in severity of symptoms  Outcome: Progressing  Goal: Absence of cardiac dysrhythmias or at baseline rhythm  Description: INTERVENTIONS:  - Continuous cardiac monitoring, vital signs, obtain 12 lead EKG if ordered  - Administer antiarrhythmic and heart rate control medications as ordered  - Monitor electrolytes and administer replacement therapy as ordered  Outcome: Progressing     Problem: PAIN - ADULT  Goal: Verbalizes/displays adequate comfort level or baseline comfort level  Description: Interventions:  - Encourage patient to monitor pain and request assistance  - Assess pain using appropriate pain scale  - Administer analgesics based on type and severity of pain and evaluate response  - Implement non-pharmacological measures as appropriate and evaluate response  - Consider cultural and social influences on pain and pain management  - Notify physician/advanced practitioner if interventions unsuccessful or patient reports new pain  Outcome: Progressing     Problem: INFECTION - ADULT  Goal: Absence or prevention of progression during hospitalization  Description: INTERVENTIONS:  - Assess and monitor for signs and symptoms of infection  - Monitor lab/diagnostic results  - Monitor all insertion sites, i e  indwelling lines, tubes, and drains  - Monitor endotracheal if appropriate and nasal secretions for changes in amount and color  - [place] appropriate cooling/warming therapies per order  - Administer medications as ordered  - Instruct and encourage patient and family to use good hand hygiene technique  - Identify and instruct in appropriate isolation precautions for identified infection/condition  Outcome: Progressing  Goal: Absence of fever/infection during neutropenic period  Description: INTERVENTIONS:  - Monitor WBC    Outcome: Progressing     Problem: SAFETY ADULT  Goal: Patient will remain free of falls  Description: INTERVENTIONS:  - Assess patient frequently for physical needs  -  Identify cognitive and physical deficits and behaviors that affect risk of falls    -  [place] fall precautions as indicated by assessment   - Educate patient/family on patient safety including physical limitations  - Instruct patient to call for assistance with activity based on assessment  - Modify environment to reduce risk of injury  - Consider OT/PT consult to assist with strengthening/mobility  Outcome: Progressing  Goal: Maintain or return to baseline ADL function  Description: INTERVENTIONS:  -  Assess patient's ability to carry out ADLs; assess patient's baseline for ADL function and identify physical deficits which impact ability to perform ADLs (bathing, care of mouth/teeth, toileting, grooming, dressing, etc )  - Assess/evaluate cause of self-care deficits   - Assess range of motion  - Assess patient's mobility; develop plan if impaired  - Assess patient's need for assistive devices and provide as appropriate  - Encourage maximum independence but intervene and supervise when necessary  - Involve family in performance of ADLs  - Assess for home care needs following discharge   - Consider OT consult to assist with ADL evaluation and planning for discharge  - Provide patient education as appropriate  Outcome: Progressing  Goal: Maintain or return mobility status to optimal level  Description: INTERVENTIONS:  - Assess patient's baseline mobility status (ambulation, transfers, stairs, etc )    - Identify cognitive and physical deficits and behaviors that affect mobility  - Identify mobility aids required to assist with transfers and/or ambulation (gait belt, sit-to-stand, lift, walker, cane, etc )  - [place] fall precautions as indicated by assessment  - Record patient progress and toleration of activity level on Mobility SBAR; progress patient to next Phase/Stage  - Instruct patient to call for assistance with activity based on assessment  - Consider rehabilitation consult to assist with strengthening/weightbearing, etc   Outcome: Progressing     Problem: DISCHARGE PLANNING  Goal: Discharge to home or other facility with appropriate resources  Description: INTERVENTIONS:  - Identify barriers to discharge w/patient and caregiver  - Arrange for needed discharge resources and transportation as appropriate  - Identify discharge learning needs (meds, wound care, etc )  - Arrange for interpretive services to assist at discharge as needed  - Refer to Case Management Department for coordinating discharge planning if the patient needs post-hospital services based on physician/advanced practitioner order or complex needs related to functional status, cognitive ability, or social support system  Outcome: Progressing     Problem: Knowledge Deficit  Goal: Patient/family/caregiver demonstrates understanding of disease process, treatment plan, medications, and discharge instructions  Description: Complete learning assessment and assess knowledge base    Interventions:  - Provide teaching at level of understanding  - Provide teaching via preferred learning methods  Outcome: Progressing

## 2020-08-04 NOTE — ASSESSMENT & PLAN NOTE
Wt Readings from Last 3 Encounters:   08/04/20 94 8 kg (208 lb 15 9 oz)     Strict intake and output  Low-sodium diet  Continue Lasix, beta-blocker, aspirin  Follow repeat echocardiogram, follow cardiology consult  Previous echocardiogram in 2018: Moderate spontaneous echo contrast seen in LA appendage  Echogeneic structure seen in appendage concerning for MOLLY thrombus  Left ventricle is mildly dilated  Severely reduced left ventricular systolic function  Global     hypokinesis  Left ventricular ejection fraction is 20%  Severely dilated left atrium  Severely dilated right atrium  Enlarged right ventricular size  Reduced right ventricular systolic function  Joe Ly

## 2020-08-05 ENCOUNTER — APPOINTMENT (INPATIENT)
Dept: NON INVASIVE DIAGNOSTICS | Facility: HOSPITAL | Age: 55
DRG: 314 | End: 2020-08-05

## 2020-08-05 PROBLEM — R79.89 ABNORMAL TSH: Status: ACTIVE | Noted: 2020-08-05

## 2020-08-05 LAB
ALBUMIN SERPL BCP-MCNC: 2.9 G/DL (ref 3.5–5)
ALP SERPL-CCNC: 150 U/L (ref 46–116)
ALT SERPL W P-5'-P-CCNC: 34 U/L (ref 12–78)
ANION GAP SERPL CALCULATED.3IONS-SCNC: 9 MMOL/L (ref 4–13)
AST SERPL W P-5'-P-CCNC: 21 U/L (ref 5–45)
BASOPHILS # BLD AUTO: 0.08 THOUSANDS/ΜL (ref 0–0.1)
BASOPHILS NFR BLD AUTO: 1 % (ref 0–1)
BILIRUB SERPL-MCNC: 0.3 MG/DL (ref 0.2–1)
BUN SERPL-MCNC: 15 MG/DL (ref 5–25)
CALCIUM SERPL-MCNC: 8.4 MG/DL (ref 8.3–10.1)
CHLORIDE SERPL-SCNC: 104 MMOL/L (ref 100–108)
CO2 SERPL-SCNC: 26 MMOL/L (ref 21–32)
CREAT SERPL-MCNC: 1.28 MG/DL (ref 0.6–1.3)
EOSINOPHIL # BLD AUTO: 1 THOUSAND/ΜL (ref 0–0.61)
EOSINOPHIL NFR BLD AUTO: 9 % (ref 0–6)
ERYTHROCYTE [DISTWIDTH] IN BLOOD BY AUTOMATED COUNT: 12.3 % (ref 11.6–15.1)
GFR SERPL CREATININE-BSD FRML MDRD: 62 ML/MIN/1.73SQ M
GLUCOSE SERPL-MCNC: 116 MG/DL (ref 65–140)
HCT VFR BLD AUTO: 44.1 % (ref 36.5–49.3)
HGB BLD-MCNC: 14.2 G/DL (ref 12–17)
IMM GRANULOCYTES # BLD AUTO: 0.03 THOUSAND/UL (ref 0–0.2)
IMM GRANULOCYTES NFR BLD AUTO: 0 % (ref 0–2)
LYMPHOCYTES # BLD AUTO: 4.19 THOUSANDS/ΜL (ref 0.6–4.47)
LYMPHOCYTES NFR BLD AUTO: 38 % (ref 14–44)
MAGNESIUM SERPL-MCNC: 1.9 MG/DL (ref 1.6–2.6)
MCH RBC QN AUTO: 28.7 PG (ref 26.8–34.3)
MCHC RBC AUTO-ENTMCNC: 32.2 G/DL (ref 31.4–37.4)
MCV RBC AUTO: 89 FL (ref 82–98)
MONOCYTES # BLD AUTO: 0.96 THOUSAND/ΜL (ref 0.17–1.22)
MONOCYTES NFR BLD AUTO: 9 % (ref 4–12)
NEUTROPHILS # BLD AUTO: 4.84 THOUSANDS/ΜL (ref 1.85–7.62)
NEUTS SEG NFR BLD AUTO: 43 % (ref 43–75)
NRBC BLD AUTO-RTO: 0 /100 WBCS
PLATELET # BLD AUTO: 313 THOUSANDS/UL (ref 149–390)
PMV BLD AUTO: 10.7 FL (ref 8.9–12.7)
POTASSIUM SERPL-SCNC: 3.6 MMOL/L (ref 3.5–5.3)
PROT SERPL-MCNC: 6.4 G/DL (ref 6.4–8.2)
RBC # BLD AUTO: 4.95 MILLION/UL (ref 3.88–5.62)
SARS-COV-2 RNA RESP QL NAA+PROBE: NEGATIVE
SODIUM SERPL-SCNC: 139 MMOL/L (ref 136–145)
T3FREE SERPL-MCNC: 2.34 PG/ML (ref 2.3–4.2)
T4 FREE SERPL-MCNC: 1.05 NG/DL (ref 0.76–1.46)
TROPONIN I SERPL-MCNC: 0.13 NG/ML
TSH SERPL DL<=0.05 MIU/L-ACNC: 0.17 UIU/ML (ref 0.36–3.74)
WBC # BLD AUTO: 11.1 THOUSAND/UL (ref 4.31–10.16)

## 2020-08-05 PROCEDURE — 99254 IP/OBS CNSLTJ NEW/EST MOD 60: CPT | Performed by: INTERNAL MEDICINE

## 2020-08-05 PROCEDURE — 99232 SBSQ HOSP IP/OBS MODERATE 35: CPT | Performed by: FAMILY MEDICINE

## 2020-08-05 PROCEDURE — 93306 TTE W/DOPPLER COMPLETE: CPT | Performed by: INTERNAL MEDICINE

## 2020-08-05 PROCEDURE — 84439 ASSAY OF FREE THYROXINE: CPT | Performed by: FAMILY MEDICINE

## 2020-08-05 PROCEDURE — 83735 ASSAY OF MAGNESIUM: CPT | Performed by: FAMILY MEDICINE

## 2020-08-05 PROCEDURE — 80053 COMPREHEN METABOLIC PANEL: CPT | Performed by: FAMILY MEDICINE

## 2020-08-05 PROCEDURE — 87635 SARS-COV-2 COVID-19 AMP PRB: CPT | Performed by: FAMILY MEDICINE

## 2020-08-05 PROCEDURE — 84481 FREE ASSAY (FT-3): CPT | Performed by: FAMILY MEDICINE

## 2020-08-05 PROCEDURE — 85025 COMPLETE CBC W/AUTO DIFF WBC: CPT | Performed by: FAMILY MEDICINE

## 2020-08-05 PROCEDURE — 93306 TTE W/DOPPLER COMPLETE: CPT

## 2020-08-05 PROCEDURE — 84443 ASSAY THYROID STIM HORMONE: CPT | Performed by: FAMILY MEDICINE

## 2020-08-05 RX ORDER — ROPINIROLE 0.25 MG/1
0.5 TABLET, FILM COATED ORAL
Status: DISCONTINUED | OUTPATIENT
Start: 2020-08-05 | End: 2020-08-08 | Stop reason: HOSPADM

## 2020-08-05 RX ADMIN — METOPROLOL TARTRATE 25 MG: 25 TABLET, FILM COATED ORAL at 11:15

## 2020-08-05 RX ADMIN — FUROSEMIDE 40 MG: 10 INJECTION, SOLUTION INTRAMUSCULAR; INTRAVENOUS at 17:00

## 2020-08-05 RX ADMIN — APIXABAN 5 MG: 5 TABLET, FILM COATED ORAL at 17:19

## 2020-08-05 RX ADMIN — DILTIAZEM HYDROCHLORIDE 30 MG: 30 TABLET, FILM COATED ORAL at 06:35

## 2020-08-05 RX ADMIN — LISINOPRIL 5 MG: 5 TABLET ORAL at 08:09

## 2020-08-05 RX ADMIN — ASPIRIN 81 MG: 81 TABLET, COATED ORAL at 08:09

## 2020-08-05 RX ADMIN — ATORVASTATIN CALCIUM 40 MG: 40 TABLET, FILM COATED ORAL at 17:19

## 2020-08-05 RX ADMIN — Medication 400 MG: at 17:19

## 2020-08-05 RX ADMIN — ROPINIROLE HYDROCHLORIDE 0.5 MG: 0.25 TABLET, FILM COATED ORAL at 21:10

## 2020-08-05 RX ADMIN — Medication 400 MG: at 08:09

## 2020-08-05 RX ADMIN — METOPROLOL TARTRATE 75 MG: 50 TABLET, FILM COATED ORAL at 21:11

## 2020-08-05 RX ADMIN — APIXABAN 5 MG: 5 TABLET, FILM COATED ORAL at 08:10

## 2020-08-05 RX ADMIN — METOPROLOL TARTRATE 5 MG: 5 INJECTION INTRAVENOUS at 21:43

## 2020-08-05 RX ADMIN — FUROSEMIDE 40 MG: 10 INJECTION, SOLUTION INTRAMUSCULAR; INTRAVENOUS at 08:10

## 2020-08-05 RX ADMIN — METOPROLOL TARTRATE 50 MG: 50 TABLET, FILM COATED ORAL at 08:09

## 2020-08-05 NOTE — H&P (VIEW-ONLY)
Consult Cardiology    Angela Hammer 1965, 63 y o  male MRN: [id number]    Unit/Bed#: -01 Encounter: [id number]    Attending Provider: Darryle Aldo, MD   Primary Care Provider: Ai Pena DO   Date admitted to hospital: 8/4/2020       Inpatient consult to Cardiology  Consult performed by: Tiara Pabon DO  Consult ordered by: Darryle Aldo, MD          Cardiomyopathy [facility])  Assessment & Plan  Likely tachycardia induced cardiomyopathy  Bedside EF approximately 20-25% with full report pending  See discussion under acute on chronic systolic heart failure  Acute on chronic systolic congestive heart failure (HCC)  Assessment & Plan  Wt Readings from Last 3 Encounters:   08/05/20 91 1 kg (200 lb 13 4 oz)     Patient with prior history ejection fraction of 20% noted in 2018 at the time of his diagnosis of atrial fibrillation  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 cardiomyopathy  Cardiac catheterization 02/2018 showed no evidence of coronary artery disease  Patient did have recovery of EF with an echocardiogram 08/2018 showing ejection fraction of 62%  Also question if there could be some component of EtOH induced cardiomyopathy  Would recommend up titration of metoprolol tartrate and then transitioning to metoprolol succinate for discharge  Low-dose ACE-I/ARB was initiated  Up titrate if renal function remains stable and blood pressure will allow (was previously lisinopril 10 mg daily)  Daily standing weights  Accurate I/O's  Optimize electrolytes with K > 4 and Mg > 2  Patient does not appear grossly volume overloaded and likely can transition to oral furosemide in am            Elevated troponin  Assessment & Plan  Elevated troponin (flat trend) secondary to demand in the setting of rapid atrial fibrillation  Troponin 0 15-0 15-0  13  No evidence of ACS  Normal coronary arteries on cardiac catheterization 02/2018  Can consider outpatient ischemic work up         * Atrial fibrillation with RVR ([facility])  Assessment & Plan  Atrial fibrillation with rapid ventricular response noted on admission  Prior history of atrial fibrillation dating back to 2018 at which time he had a left atrial appendage thrombus and was initially unable to undergo cardioversion however he was subsequently cardioverted 03/2018 and started on amiodarone for rhythm control  Patient had been on amiodarone 200 mg daily as well as metoprolol succinate 50 mg twice a day and Eliquis anticoagulation but lasts insurance several months ago and has not been on any medications for at least the last 6 months and perhaps longer  CHADSVASc= 1 (LV dysfunction)  Continue Eliquis anticoagulation  Continue metoprolol tartrate but increase to 75 mg twice daily  Will discontinue diltiazem given reduced LVEF  Will plan KAYLYN guided cardioversion 8/6/2020  Amiodarone 3-6 months only post cardioversion  Discussed the need for ETOH cessation  Transition to long acting metoprolol succinate given low EF  Would have low threshold for ablation given recurrent heart failure in the setting of atrial fibrillation  Eventual sleep study  Physician Requesting Consult: Heidi Easton MD    Reason for Consult / Principal Problem: Atrial fibrillation with rapid ventricular response  HPI: Karla Moya is a 63y o  year old male who has a history as outlined below presented to the hospital with rapid heart rate  The patient reports a fall approximately a week ago and had a scheduled visit with his PCP on the day of admission at which time he was found to have a rapid heart rate  Upon evaluation in the emergency room he was noted to have rapid atrial fibrillation  The patient does have a known history of atrial fibrillation and was previously on Eliquis, amiodarone, metoprolol and statin therapy for a few months but had stopped them due to loss of insurance  Troponin was initially noted to be 0 15    NT proBNP 2,786  Chest x-ray was unrevealing per report  Personal review suggests some mild pulmonary edema  The patient had atrial fibrillation in 2018 at which time he underwent cardioversion  Patient did initially have a left atrial thrombus and did not immediately undergo cardioversion  He was anticoagulated and underwent repeat KAYLYN 03/2018 at which time he underwent cardioversion and was started on amiodarone  Patient was known to have a tachycardia induced cardiomyopathy with an EF of 20% at that time  The patient did undergo cardiac catheterization 02/22/2018 which showed normal coronary arteries  Repeat echocardiogram done later in 2018 (08/2018) showed an ejection fraction of 62 5%      Patient is currently resting comfortably  He does tell me that he was aware of his heart racing at least the night prior to his evaluation with PCP  He denies any overt chest pain  He does tell me that he has been having dyspnea on exertion/shortness of breath which has been in his opinion worse over the last few weeks  He has noted intermittent lower extremity edema  He was given IV furosemide and states that he feels his breathing has improved already since admission  Heart rates have remained elevated overnight but are better controlled this morning  Echocardiogram being done at bedside  EF low  Estimated at 20-25%  Full review and report later  Review of Systems   Constitutional: Negative for activity change, appetite change and fatigue  HENT: Negative for congestion, hearing loss, tinnitus and trouble swallowing  Eyes: Negative for visual disturbance  Respiratory: Positive for shortness of breath (dyspnea on exertion)  Negative for cough, chest tightness and wheezing  Cardiovascular: Positive for palpitations and leg swelling  Negative for chest pain  Gastrointestinal: Negative for abdominal distention, abdominal pain, nausea and vomiting     Genitourinary: Negative for difficulty urinating  Musculoskeletal: Negative for arthralgias  Skin: Negative for rash  Neurological: Negative for dizziness, syncope and light-headedness  Hematological: Does not bruise/bleed easily  Psychiatric/Behavioral: Negative for confusion  The patient is not nervous/anxious  All other systems reviewed and are negative         Historical Information     Past Medical History:   Diagnosis Date    Atrial fibrillation ([facility] )     Hyperlipidemia     Overweight     Tachycardia induced cardiomyopathy ([facility] )      Past Surgical History:   Procedure Laterality Date    CARDIAC CATHETERIZATION  02/22/2018    CARDIOVERSION  03/2018    INGUINAL HERNIA REPAIR      SINUS SURGERY       Social History     Substance and Sexual Activity   Alcohol Use Yes    Alcohol/week: 12 0 standard drinks    Types: 12 Standard drinks or equivalent per week    Frequency: 4 or more times a week    Drinks per session: 3 or 4    Binge frequency: Never    Comment: Mixed drinks several days a week     Social History     Substance and Sexual Activity   Drug Use Never     Social History     Tobacco Use   Smoking Status Former Smoker    Types: Cigarettes   Smokeless Tobacco Never Used       Family History:   Family History   Problem Relation Age of Onset    No Known Problems Mother     Cancer Father        Meds/Allergies     current meds:   Current Facility-Administered Medications   Medication Dose Route Frequency    acetaminophen (TYLENOL) tablet 650 mg  650 mg Oral Q6H PRN    aluminum-magnesium hydroxide-simethicone (MYLANTA) 200-200-20 mg/5 mL oral suspension 30 mL  30 mL Oral Q6H PRN    apixaban (ELIQUIS) tablet 5 mg  5 mg Oral BID    aspirin (ECOTRIN LOW STRENGTH) EC tablet 81 mg  81 mg Oral Daily    atorvastatin (LIPITOR) tablet 40 mg  40 mg Oral Daily With Dinner    calcium carbonate (TUMS) chewable tablet 1,000 mg  1,000 mg Oral Daily PRN    docusate sodium (COLACE) capsule 100 mg  100 mg Oral BID    furosemide (LASIX) injection 40 mg  40 mg Intravenous BID (diuretic)    lisinopril (ZESTRIL) tablet 5 mg  5 mg Oral Daily    magnesium oxide (MAG-OX) tablet 400 mg  400 mg Oral BID    metoprolol (LOPRESSOR) injection 5 mg  5 mg Intravenous Q6H PRN    metoprolol tartrate (LOPRESSOR) tablet 75 mg  75 mg Oral Q12H [address]    ondansetron (ZOFRAN) injection 4 mg  4 mg Intravenous Q6H PRN          No Known Allergies    Objective     Vitals: Blood pressure 120/83, pulse 85, temperature 98 1 °F (36 7 °C), resp  rate 18, height 6' 1" (1 854 m), weight 91 1 kg (200 lb 13 4 oz), SpO2 97 %  , Body mass index is 26 5 kg/m²  Orthostatic Blood Pressures      Most Recent Value   Blood Pressure  120/83 filed at 08/05/2020 1123   Patient Position - Orthostatic VS  Lying filed at 08/04/2020 4303          Systolic (59GGM), UWY:192 , Min:104 , QWZ:530     Diastolic (59EHB), XLX:85, Min:82, Max:100        Intake/Output Summary (Last 24 hours) at 8/5/2020 1135  Last data filed at 8/5/2020 1124  Gross per 24 hour   Intake 483 ml   Output 2350 ml   Net -1867 ml       Weight (last 2 days)     Date/Time   Weight    08/05/20 0521   91 1 (200 84)    08/04/20 1644   94 8 (209)    08/04/20 1316   95 2 (209 88)              Invasive Devices     Peripheral Intravenous Line            Peripheral IV 08/04/20 Right Forearm less than 1 day                Physical Exam  Vitals signs reviewed  Constitutional:       Appearance: He is well-developed  HENT:      Head: Normocephalic and atraumatic  Eyes:      Pupils: Pupils are equal, round, and reactive to light  Neck:      Musculoskeletal: Normal range of motion  Vascular: No JVD  Cardiovascular:      Rate and Rhythm: Normal rate  Rhythm regularly irregular  Heart sounds: Normal heart sounds  No murmur  No friction rub  No gallop  Pulmonary:      Effort: Pulmonary effort is normal       Breath sounds: Decreased breath sounds present  No rales  Abdominal:      Palpations: Abdomen is soft  Skin:     General: Skin is warm and dry  Neurological:      Mental Status: He is alert and oriented to person, place, and time  Psychiatric:         Behavior: Behavior normal               Laboratory Results:    Results from last 7 days   Lab Units 08/04/20  2330 08/04/20 2007 08/04/20  1701   TROPONIN I ng/mL 0 13* 0 15* 0 15*       CBC with diff:   Results from last 7 days   Lab Units 08/05/20 0518 08/04/20  1323   WBC Thousand/uL 11 10* 11 37*   HEMOGLOBIN g/dL 14 2 15 0   HEMATOCRIT % 44 1 49 3   MCV fL 89 90   PLATELETS Thousands/uL 313 359   MCH pg 28 7 27 4   MCHC g/dL 32 2 30 4*   RDW % 12 3 12 5   MPV fL 10 7 10 7   NRBC AUTO /100 WBCs 0 0         CMP:  Results from last 7 days   Lab Units 08/05/20  0518 08/04/20  1323   POTASSIUM mmol/L 3 6 4 2   CHLORIDE mmol/L 104 105   CO2 mmol/L 26 28   BUN mg/dL 15 18   CREATININE mg/dL 1 28 1 29   CALCIUM mg/dL 8 4 8 6   AST U/L 21 27   ALT U/L 34 40   ALK PHOS U/L 150* 175*   EGFR ml/min/1 73sq m 62 62       BMP:  Results from last 7 days   Lab Units 08/05/20 0518 08/04/20  1323   POTASSIUM mmol/L 3 6 4 2   CHLORIDE mmol/L 104 105   CO2 mmol/L 26 28   BUN mg/dL 15 18   CREATININE mg/dL 1 28 1 29   CALCIUM mg/dL 8 4 8 6       BNP:  No results for input(s): BNP in the last 72 hours      Magnesium:   Results from last 7 days   Lab Units 08/05/20  0518   MAGNESIUM mg/dL 1 9       Coags:   Results from last 7 days   Lab Units 08/04/20  1323   PTT seconds 27   INR  1 12       TSH:   0 147-0 172    Hemoglobin A1C:   Results from last 7 days   Lab Units 08/04/20  1323   HEMOGLOBIN A1C % 5 5       Lipid Profile:   Lab Results   Component Value Date    CHOLESTEROL 151 08/04/2020     Lab Results   Component Value Date    HDL 40 08/04/2020     Lab Results   Component Value Date    TRIG 111 08/04/2020     Lab Results   Component Value Date    [place] 111 08/04/2020        Cardiac testing:       Imaging: I have personally reviewed pertinent films in PACS    Xr Chest 1 View Portable: Result Date: 8/4/2020  Narrative: CHEST INDICATION:   Shortness of breath  COMPARISON:  None EXAM PERFORMED/VIEWS:  XR CHEST PORTABLE FINDINGS: Cardiomediastinal silhouette appears unremarkable  The lungs are clear  No pneumothorax or pleural effusion  Osseous structures appear within normal limits for patient age  Impression: No acute cardiopulmonary disease  Workstation performed: [id number]       EKG reviewed personally: EKG:       Telemetry:  Atrial fibrillation with rapid ventricular rates with intermittent rate control  Counseling / Coordination of Care  Total floor / unit time spent today 40 minutes  Greater than 50% of total time was spent with the patient and / or family counseling and / or coordination of care  A description of the counseling / coordination of care: Discussed options for treatment with patient  Reviewed indications as well as risks of KAYLYN guided cardioversion     Discussed with Dr Moore Reason       Code Status: Level 1 - Full Code

## 2020-08-05 NOTE — SOCIAL WORK
Current LOS 1 day  CM consult for Post Acute Home Needs, Medications  CM met with patient at the bedside,baseline information was obtained  CM discussed the role of CM in helping the patient develop a discharge plan and assist the patient in carry out their plan  Pt lives with his son in a 4 SH, 2 [address]BERLY, and 15 steps between floors  (includes basement and attic)  Pt bed room and bathroom are on the 3rd floor  The home does not have a bathroom on main level (2nd floor)  Pt completes ADLs independently  Pt ambulates independenlty  No DME  Pt drives  Pt is employed Part Time  Pt has no hx of STR or HHC  Pt denies hx of MH or D&A tx  PCP: Dr Nirali Kaufman [facility])  Preferred Pharmacy: [facility] in [address] [place] [address] [address]: Parth Tolbert (friend) [phone] or Leyla Alvarado (mother) [phone]  Pt has no formal POA  Pt states his brothers Iwona García or Dorian Cobos would speak on his behalg  Pt has his car on site and intends to drive himself home if medically appropriate  Otherwise, he has family/friends that will transport home  CM discussed consult for needs and medications  Pt indicated he does not have any current insurance  Pt states he stopped taking his medications over 6 months ago and that he has not seen his doctor in over a year  CM already alerted the financial counselor to make a PATHS referral  CM will work with pt and care team to develop a safe d/c plan

## 2020-08-05 NOTE — CONSULTS
Consult Cardiology    Angela Hammer 1965, 63 y o  male MRN: [id number]    Unit/Bed#: -01 Encounter: [id number]    Attending Provider: Darryle Aldo, MD   Primary Care Provider: Ai Pena DO   Date admitted to hospital: 8/4/2020       Inpatient consult to Cardiology  Consult performed by: Tiara Pabon DO  Consult ordered by: Darryle Aldo, MD          Cardiomyopathy [facility])  Assessment & Plan  Likely tachycardia induced cardiomyopathy  Bedside EF approximately 20-25% with full report pending  See discussion under acute on chronic systolic heart failure  Acute on chronic systolic congestive heart failure (HCC)  Assessment & Plan  Wt Readings from Last 3 Encounters:   08/05/20 91 1 kg (200 lb 13 4 oz)     Patient with prior history ejection fraction of 20% noted in 2018 at the time of his diagnosis of atrial fibrillation  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 cardiomyopathy  Cardiac catheterization 02/2018 showed no evidence of coronary artery disease  Patient did have recovery of EF with an echocardiogram 08/2018 showing ejection fraction of 62%  Also question if there could be some component of EtOH induced cardiomyopathy  Would recommend up titration of metoprolol tartrate and then transitioning to metoprolol succinate for discharge  Low-dose ACE-I/ARB was initiated  Up titrate if renal function remains stable and blood pressure will allow (was previously lisinopril 10 mg daily)  Daily standing weights  Accurate I/O's  Optimize electrolytes with K > 4 and Mg > 2  Patient does not appear grossly volume overloaded and likely can transition to oral furosemide in am            Elevated troponin  Assessment & Plan  Elevated troponin (flat trend) secondary to demand in the setting of rapid atrial fibrillation  Troponin 0 15-0 15-0  13  No evidence of ACS  Normal coronary arteries on cardiac catheterization 02/2018  Can consider outpatient ischemic work up         * Atrial fibrillation with RVR ([facility])  Assessment & Plan  Atrial fibrillation with rapid ventricular response noted on admission  Prior history of atrial fibrillation dating back to 2018 at which time he had a left atrial appendage thrombus and was initially unable to undergo cardioversion however he was subsequently cardioverted 03/2018 and started on amiodarone for rhythm control  Patient had been on amiodarone 200 mg daily as well as metoprolol succinate 50 mg twice a day and Eliquis anticoagulation but lasts insurance several months ago and has not been on any medications for at least the last 6 months and perhaps longer  CHADSVASc= 1 (LV dysfunction)  Continue Eliquis anticoagulation  Continue metoprolol tartrate but increase to 75 mg twice daily  Will discontinue diltiazem given reduced LVEF  Will plan KAYLYN guided cardioversion 8/6/2020  Amiodarone 3-6 months only post cardioversion  Discussed the need for ETOH cessation  Transition to long acting metoprolol succinate given low EF  Would have low threshold for ablation given recurrent heart failure in the setting of atrial fibrillation  Eventual sleep study  Physician Requesting Consult: Arcelia Barton MD    Reason for Consult / Principal Problem: Atrial fibrillation with rapid ventricular response  HPI: Fredrick Tucker is a 63y o  year old male who has a history as outlined below presented to the hospital with rapid heart rate  The patient reports a fall approximately a week ago and had a scheduled visit with his PCP on the day of admission at which time he was found to have a rapid heart rate  Upon evaluation in the emergency room he was noted to have rapid atrial fibrillation  The patient does have a known history of atrial fibrillation and was previously on Eliquis, amiodarone, metoprolol and statin therapy for a few months but had stopped them due to loss of insurance  Troponin was initially noted to be 0 15    NT proBNP 2,786  Chest x-ray was unrevealing per report  Personal review suggests some mild pulmonary edema  The patient had atrial fibrillation in 2018 at which time he underwent cardioversion  Patient did initially have a left atrial thrombus and did not immediately undergo cardioversion  He was anticoagulated and underwent repeat KAYLYN 03/2018 at which time he underwent cardioversion and was started on amiodarone  Patient was known to have a tachycardia induced cardiomyopathy with an EF of 20% at that time  The patient did undergo cardiac catheterization 02/22/2018 which showed normal coronary arteries  Repeat echocardiogram done later in 2018 (08/2018) showed an ejection fraction of 62 5%      Patient is currently resting comfortably  He does tell me that he was aware of his heart racing at least the night prior to his evaluation with PCP  He denies any overt chest pain  He does tell me that he has been having dyspnea on exertion/shortness of breath which has been in his opinion worse over the last few weeks  He has noted intermittent lower extremity edema  He was given IV furosemide and states that he feels his breathing has improved already since admission  Heart rates have remained elevated overnight but are better controlled this morning  Echocardiogram being done at bedside  EF low  Estimated at 20-25%  Full review and report later  Review of Systems   Constitutional: Negative for activity change, appetite change and fatigue  HENT: Negative for congestion, hearing loss, tinnitus and trouble swallowing  Eyes: Negative for visual disturbance  Respiratory: Positive for shortness of breath (dyspnea on exertion)  Negative for cough, chest tightness and wheezing  Cardiovascular: Positive for palpitations and leg swelling  Negative for chest pain  Gastrointestinal: Negative for abdominal distention, abdominal pain, nausea and vomiting     Genitourinary: Negative for difficulty urinating  Musculoskeletal: Negative for arthralgias  Skin: Negative for rash  Neurological: Negative for dizziness, syncope and light-headedness  Hematological: Does not bruise/bleed easily  Psychiatric/Behavioral: Negative for confusion  The patient is not nervous/anxious  All other systems reviewed and are negative         Historical Information     Past Medical History:   Diagnosis Date    Atrial fibrillation ([facility] )     Hyperlipidemia     Overweight     Tachycardia induced cardiomyopathy ([facility] )      Past Surgical History:   Procedure Laterality Date    CARDIAC CATHETERIZATION  02/22/2018    CARDIOVERSION  03/2018    INGUINAL HERNIA REPAIR      SINUS SURGERY       Social History     Substance and Sexual Activity   Alcohol Use Yes    Alcohol/week: 12 0 standard drinks    Types: 12 Standard drinks or equivalent per week    Frequency: 4 or more times a week    Drinks per session: 3 or 4    Binge frequency: Never    Comment: Mixed drinks several days a week     Social History     Substance and Sexual Activity   Drug Use Never     Social History     Tobacco Use   Smoking Status Former Smoker    Types: Cigarettes   Smokeless Tobacco Never Used       Family History:   Family History   Problem Relation Age of Onset    No Known Problems Mother     Cancer Father        Meds/Allergies     current meds:   Current Facility-Administered Medications   Medication Dose Route Frequency    acetaminophen (TYLENOL) tablet 650 mg  650 mg Oral Q6H PRN    aluminum-magnesium hydroxide-simethicone (MYLANTA) 200-200-20 mg/5 mL oral suspension 30 mL  30 mL Oral Q6H PRN    apixaban (ELIQUIS) tablet 5 mg  5 mg Oral BID    aspirin (ECOTRIN LOW STRENGTH) EC tablet 81 mg  81 mg Oral Daily    atorvastatin (LIPITOR) tablet 40 mg  40 mg Oral Daily With Dinner    calcium carbonate (TUMS) chewable tablet 1,000 mg  1,000 mg Oral Daily PRN    docusate sodium (COLACE) capsule 100 mg  100 mg Oral BID    furosemide (LASIX) injection 40 mg  40 mg Intravenous BID (diuretic)    lisinopril (ZESTRIL) tablet 5 mg  5 mg Oral Daily    magnesium oxide (MAG-OX) tablet 400 mg  400 mg Oral BID    metoprolol (LOPRESSOR) injection 5 mg  5 mg Intravenous Q6H PRN    metoprolol tartrate (LOPRESSOR) tablet 75 mg  75 mg Oral Q12H [facility] & long-term    ondansetron (ZOFRAN) injection 4 mg  4 mg Intravenous Q6H PRN          No Known Allergies    Objective     Vitals: Blood pressure 120/83, pulse 85, temperature 98 1 °F (36 7 °C), resp  rate 18, height 6' 1" (1 854 m), weight 91 1 kg (200 lb 13 4 oz), SpO2 97 %  , Body mass index is 26 5 kg/m²  Orthostatic Blood Pressures      Most Recent Value   Blood Pressure  120/83 filed at 08/05/2020 1123   Patient Position - Orthostatic VS  Lying filed at 08/04/2020 1553          Systolic (32ACJ), ZGT:829 , Min:104 , PEQ:628     Diastolic (07IYE), DPH:72, Min:82, Max:100        Intake/Output Summary (Last 24 hours) at 8/5/2020 1135  Last data filed at 8/5/2020 1124  Gross per 24 hour   Intake 483 ml   Output 2350 ml   Net -1867 ml       Weight (last 2 days)     Date/Time   Weight    08/05/20 0521   91 1 (200 84)    08/04/20 1644   94 8 (209)    08/04/20 1316   95 2 (209 88)              Invasive Devices     Peripheral Intravenous Line            Peripheral IV 08/04/20 Right Forearm less than 1 day                Physical Exam  Vitals signs reviewed  Constitutional:       Appearance: He is well-developed  HENT:      Head: Normocephalic and atraumatic  Eyes:      Pupils: Pupils are equal, round, and reactive to light  Neck:      Musculoskeletal: Normal range of motion  Vascular: No JVD  Cardiovascular:      Rate and Rhythm: Normal rate  Rhythm regularly irregular  Heart sounds: Normal heart sounds  No murmur  No friction rub  No gallop  Pulmonary:      Effort: Pulmonary effort is normal       Breath sounds: Decreased breath sounds present  No rales  Abdominal:      Palpations: Abdomen is soft  Skin:     General: Skin is warm and dry  Neurological:      Mental Status: He is alert and oriented to person, place, and time  Psychiatric:         Behavior: Behavior normal               Laboratory Results:    Results from last 7 days   Lab Units 08/04/20  2330 08/04/20 2007 08/04/20  1701   TROPONIN I ng/mL 0 13* 0 15* 0 15*       CBC with diff:   Results from last 7 days   Lab Units 08/05/20 0518 08/04/20  1323   WBC Thousand/uL 11 10* 11 37*   HEMOGLOBIN g/dL 14 2 15 0   HEMATOCRIT % 44 1 49 3   MCV fL 89 90   PLATELETS Thousands/uL 313 359   MCH pg 28 7 27 4   MCHC g/dL 32 2 30 4*   RDW % 12 3 12 5   MPV fL 10 7 10 7   NRBC AUTO /100 WBCs 0 0         CMP:  Results from last 7 days   Lab Units 08/05/20  0518 08/04/20  1323   POTASSIUM mmol/L 3 6 4 2   CHLORIDE mmol/L 104 105   CO2 mmol/L 26 28   BUN mg/dL 15 18   CREATININE mg/dL 1 28 1 29   CALCIUM mg/dL 8 4 8 6   AST U/L 21 27   ALT U/L 34 40   ALK PHOS U/L 150* 175*   EGFR ml/min/1 73sq m 62 62       BMP:  Results from last 7 days   Lab Units 08/05/20 0518 08/04/20  1323   POTASSIUM mmol/L 3 6 4 2   CHLORIDE mmol/L 104 105   CO2 mmol/L 26 28   BUN mg/dL 15 18   CREATININE mg/dL 1 28 1 29   CALCIUM mg/dL 8 4 8 6       BNP:  No results for input(s): BNP in the last 72 hours      Magnesium:   Results from last 7 days   Lab Units 08/05/20  0518   MAGNESIUM mg/dL 1 9       Coags:   Results from last 7 days   Lab Units 08/04/20  1323   PTT seconds 27   INR  1 12       TSH:   0 147-0 172    Hemoglobin A1C:   Results from last 7 days   Lab Units 08/04/20  1323   HEMOGLOBIN A1C % 5 5       Lipid Profile:   Lab Results   Component Value Date    CHOLESTEROL 151 08/04/2020     Lab Results   Component Value Date    HDL 40 08/04/2020     Lab Results   Component Value Date    TRIG 111 08/04/2020     Lab Results   Component Value Date    [place] 111 08/04/2020        Cardiac testing:       Imaging: I have personally reviewed pertinent films in PACS    Xr Chest 1 View Portable: Result Date: 8/4/2020  Narrative: CHEST INDICATION:   Shortness of breath  COMPARISON:  None EXAM PERFORMED/VIEWS:  XR CHEST PORTABLE FINDINGS: Cardiomediastinal silhouette appears unremarkable  The lungs are clear  No pneumothorax or pleural effusion  Osseous structures appear within normal limits for patient age  Impression: No acute cardiopulmonary disease  Workstation performed: [id number]       EKG reviewed personally: EKG:       Telemetry:  Atrial fibrillation with rapid ventricular rates with intermittent rate control  Counseling / Coordination of Care  Total floor / unit time spent today 40 minutes  Greater than 50% of total time was spent with the patient and / or family counseling and / or coordination of care  A description of the counseling / coordination of care: Discussed options for treatment with patient  Reviewed indications as well as risks of KAYLYN guided cardioversion     Discussed with Dr Oris Fleischer       Code Status: Level 1 - Full Code

## 2020-08-05 NOTE — PROGRESS NOTES
Progress Note - Romelia Chang 1965, 63 y o  male MRN: [id number]    Unit/Bed#: -01 Encounter: [id number]    Primary Care Provider: Ping Sue DO   Date and time admitted to hospital: 8/4/2020  1:11 PM        Elevated troponin  Assessment & Plan  Most likely is demand secondary to AFib with RVR  No significant EKG change regarding ST-T elevation-patient does not complain any chest pain  Patient had previous cardiac catheterization in 2018, which was normal   Troponin: 0 15 X 3> 0 13    * Atrial fibrillation with RVR ([facility] )  Assessment & Plan  Present on admission  Patient received digoxin 250 mcgm X 2, Lopressor 5 mg x 2, Cardizem 15 mg, then 20 mg IV  Patient was also placed on Cardizem drip which discontinued  Currently rate controlled  Continue metoprolol 75 mg p o  B i d -and titrate as long as heart rate tolerated  Lopressor IV as needed  Continue aspirin, Eliquis  As per chart review,Hx of Afib s/p cardioversion; Hx of LA thrombus   Patient was admitted to [address] [place] [address] in 1/2018 for this where he was found to be in Afib with RVR  He did have an echo that demonstrated LA thrombus so no cardioversion was performed initially  he was rate controlled and anticoagulated with eliquis  Subsequent KAYLYN on 3/2018 demonstrated no thormbus so he was successful cardioverted  [id number]: Moderate spontaneous echo contrast seen in LA appendage  Echogeneic structure seen in appendage concerning for MOLLY thrombus  Left ventricle is mildly dilated  Severely reduced left ventricular systolic function  Global     hypokinesis  Left ventricular ejection fraction is 20%  Severely dilated left atrium  Severely dilated right atrium  Enlarged right ventricular size  Reduced right ventricular systolic function  Dani Maier      Pending echo report, but as per cardiology note, ejection fraction in between 20-25%  Monitor on telemetry  Cardiology consult appreciated-plan is to do KAYLYN cardioversion on 8/6/20  Maintain potassium more than 4, magnesium more than 2    Acute on chronic systolic congestive heart failure (HCC)  Assessment & Plan  Wt Readings from Last 3 Encounters:   08/05/20 91 1 kg (200 lb 13 4 oz)     Strict intake and output  Low-sodium diet  Continue Lasix, beta-blocker, aspirin  Follow repeat echocardiogram,  Cardiology consult appreciated  Previous echocardiogram in 2018: Moderate spontaneous echo contrast seen in LA appendage  Echogeneic structure seen in appendage concerning for MOLLY thrombus  Left ventricle is mildly dilated  Severely reduced left ventricular systolic function  Global     hypokinesis  Left ventricular ejection fraction is 20%  Severely dilated left atrium  Severely dilated right atrium  Enlarged right ventricular size  Reduced right ventricular systolic function        Patient also had cardiac catheterization done in 2018, which was normal      Abnormal TSH  Assessment & Plan  TSH came back 0 147> 0 172  Follow-up free T3, T4  Patient was on amiodarone previously    Cardiomyopathy [facility])  Assessment & Plan  Most likely secondary to tachyarrhythmia related  Previous echocardiogram shows: Moderate spontaneous echo contrast seen in LA appendage  Echogeneic structure seen in appendage concerning for MOLLY thrombus  Left ventricle is mildly dilated  Severely reduced left ventricular systolic function  Global     hypokinesis  Left ventricular ejection fraction is 20%  Severely dilated left atrium  Severely dilated right atrium  Enlarged right ventricular size  Reduced right ventricular systolic function      Cardiology consult appreciated  Continue beta-blocker, aspirin, statin, lisinopril  Follow echo cardiac      VTE Pharmacologic Prophylaxis:   Pharmacologic: Apixaban (Eliquis)  Mechanical VTE Prophylaxis in Place: Yes    Patient Centered Rounds: I have performed bedside rounds with nursing staff today      Discussions with Specialists or Other Care Team Provider: Cardiology    Education and Discussions with Family / Patient:  Yes with patient    Time Spent for Care: 30 minutes  More than 50% of total time spent on counseling and coordination of care as described above  Current Length of Stay: 1 day(s)    Current Patient Status: Inpatient   Certification Statement: The patient will continue to require additional inpatient hospital stay due to AFib with RVR, cardiomyopathy    Discharge Plan / Estimated Discharge Date: To be determined      Code Status: Level 1 - Full Code      Subjective:   Seen and evaluated during the round  Patient denies any significant complaint  Objective:     Vitals:   Temp (24hrs), Av 8 °F (36 6 °C), Min:97 2 °F (36 2 °C), Max:98 4 °F (36 9 °C)    Temp:  [97 2 °F (36 2 °C)-98 4 °F (36 9 °C)] 98 1 °F (36 7 °C)  HR:  [] 85  Resp:  [18-25] 18  BP: (104-139)/() 120/83  SpO2:  [95 %-100 %] 97 %  Body mass index is 26 5 kg/m²  Input and Output Summary (last 24 hours): Intake/Output Summary (Last 24 hours) at 2020 1146  Last data filed at 2020 1124  Gross per 24 hour   Intake 483 ml   Output 2350 ml   Net -1867 ml       Physical Exam:     Physical Exam   Eyes: Pupils are equal, round, and reactive to light  Conjunctivae are normal  No scleral icterus  Neck: Normal range of motion  Neck supple  Cardiovascular: An irregular rhythm present  Exam reveals no gallop and no friction rub  No murmur heard  Patient's heart rate varies   Pulmonary/Chest: Effort normal and breath sounds normal  No respiratory distress  He has no rales  Abdominal: Bowel sounds are normal  There is no abdominal tenderness  There is no guarding  Musculoskeletal: Normal range of motion  General: No swelling or deformity  Neurological: He is alert  Skin: Skin is warm  He is not diaphoretic  Psychiatric: Mood normal    Nursing note and vitals reviewed      Additional Data:     Labs:    Results from last 7 days   Lab Units 08/05/20  0518   WBC Thousand/uL 11 10*   HEMOGLOBIN g/dL 14 2   HEMATOCRIT % 44 1   PLATELETS Thousands/uL 313   NEUTROS PCT % 43   LYMPHS PCT % 38   MONOS PCT % 9   EOS PCT % 9*     Results from last 7 days   Lab Units 08/05/20  0518   POTASSIUM mmol/L 3 6   CHLORIDE mmol/L 104   CO2 mmol/L 26   BUN mg/dL 15   CREATININE mg/dL 1 28   CALCIUM mg/dL 8 4   ALK PHOS U/L 150*   ALT U/L 34   AST U/L 21     Results from last 7 days   Lab Units 08/04/20  1323   INR  1 12       * I Have Reviewed All Lab Data Listed Above  * Additional Pertinent Lab Tests Reviewed: All Labs Within Last 24 Hours Reviewed      Last 24 Hours Medication List:   acetaminophen, 650 mg, Oral, Q6H PRN, Nigel Canada MD  aluminum-magnesium hydroxide-simethicone, 30 mL, Oral, Q6H PRN, Kacy Mays MD  apixaban, 5 mg, Oral, BID, Dmitriy Mays MD  aspirin, 81 mg, Oral, Daily, Nigel Canada MD  atorvastatin, 40 mg, Oral, Daily With Edmar Moyer MD  calcium carbonate, 1,000 mg, Oral, Daily PRN, Nigel Canada MD  docusate sodium, 100 mg, Oral, BID, Dmitriy Mays MD  furosemide, 40 mg, Intravenous, BID (diuretic), Nigel Canada MD  lisinopril, 5 mg, Oral, Daily, Nigel Canada MD  magnesium oxide, 400 mg, Oral, BID, Dmitriy Mays MD  metoprolol, 5 mg, Intravenous, Q6H PRN, Nigel Canada MD  metoprolol tartrate, 75 mg, Oral, Q12H [address], Mary Garcia DO  ondansetron, 4 mg, Intravenous, Q6H PRN, Nigel Canada MD         Today, Patient Was Seen By: Nigel Canada MD    ** Please Note: Dragon 360 Dictation voice to text software may have been used in the creation of this document   **

## 2020-08-05 NOTE — ASSESSMENT & PLAN NOTE
Wt Readings from Last 3 Encounters:   08/05/20 91 1 kg (200 lb 13 4 oz)     Strict intake and output  Low-sodium diet  Continue Lasix, beta-blocker, aspirin  Follow repeat echocardiogram,  Cardiology consult appreciated  Previous echocardiogram in 2018: Moderate spontaneous echo contrast seen in LA appendage  Echogeneic structure seen in appendage concerning for MOLLY thrombus  Left ventricle is mildly dilated  Severely reduced left ventricular systolic function  Global     hypokinesis  Left ventricular ejection fraction is 20%  Severely dilated left atrium  Severely dilated right atrium  Enlarged right ventricular size  Reduced right ventricular systolic function        Patient also had cardiac catheterization done in 2018, which was normal

## 2020-08-05 NOTE — ASSESSMENT & PLAN NOTE
Most likely secondary to tachyarrhythmia related  Previous echocardiogram shows: Moderate spontaneous echo contrast seen in LA appendage  Echogeneic structure seen in appendage concerning for MOLLY thrombus  Left ventricle is mildly dilated  Severely reduced left ventricular systolic function  Global     hypokinesis  Left ventricular ejection fraction is 20%  Severely dilated left atrium  Severely dilated right atrium  Enlarged right ventricular size  Reduced right ventricular systolic function      Cardiology consult appreciated  Continue beta-blocker, aspirin, statin, lisinopril  Follow echo cardiac

## 2020-08-05 NOTE — QUICK NOTE
TT cardiologist Dr Roberth Dhaliwal to discuss case  The last echocardiogram from August 2018 found in care everywhere EF 62%  Add Cardizem to Lopressor for rate control, continue anticoagulation, repeat echocardiogram tomorrow and will undergo formal Cardiology consultation

## 2020-08-05 NOTE — ASSESSMENT & PLAN NOTE
Elevated troponin (flat trend) secondary to demand in the setting of rapid atrial fibrillation  Troponin 0 15-0 15-0  13  No evidence of ACS  Normal coronary arteries on cardiac catheterization 02/2018  Can consider outpatient ischemic work up if deemed appropriate

## 2020-08-05 NOTE — PLAN OF CARE
Problem: Potential for Falls  Goal: Patient will remain free of falls  Description: INTERVENTIONS:  - Assess patient frequently for physical needs  -  Identify cognitive and physical deficits and behaviors that affect risk of falls    -  [place] fall precautions as indicated by assessment   - Educate patient/family on patient safety including physical limitations  - Instruct patient to call for assistance with activity based on assessment  - Modify environment to reduce risk of injury  - Consider OT/PT consult to assist with strengthening/mobility  Outcome: Progressing     Problem: CARDIOVASCULAR - ADULT  Goal: Maintains optimal cardiac output and hemodynamic stability  Description: INTERVENTIONS:  - Monitor I/O, vital signs and rhythm  - Monitor for S/S and trends of decreased cardiac output  - Administer and titrate ordered vasoactive medications to optimize hemodynamic stability  - Assess quality of pulses, skin color and temperature  - Assess for signs of decreased coronary artery perfusion  - Instruct patient to report change in severity of symptoms  Outcome: Progressing  Goal: Absence of cardiac dysrhythmias or at baseline rhythm  Description: INTERVENTIONS:  - Continuous cardiac monitoring, vital signs, obtain 12 lead EKG if ordered  - Administer antiarrhythmic and heart rate control medications as ordered  - Monitor electrolytes and administer replacement therapy as ordered  Outcome: Progressing     Problem: PAIN - ADULT  Goal: Verbalizes/displays adequate comfort level or baseline comfort level  Description: Interventions:  - Encourage patient to monitor pain and request assistance  - Assess pain using appropriate pain scale  - Administer analgesics based on type and severity of pain and evaluate response  - Implement non-pharmacological measures as appropriate and evaluate response  - Consider cultural and social influences on pain and pain management  - Notify physician/advanced practitioner if interventions unsuccessful or patient reports new pain  Outcome: Progressing     Problem: INFECTION - ADULT  Goal: Absence or prevention of progression during hospitalization  Description: INTERVENTIONS:  - Assess and monitor for signs and symptoms of infection  - Monitor lab/diagnostic results  - Monitor all insertion sites, i e  indwelling lines, tubes, and drains  - Monitor endotracheal if appropriate and nasal secretions for changes in amount and color  - [place] appropriate cooling/warming therapies per order  - Administer medications as ordered  - Instruct and encourage patient and family to use good hand hygiene technique  - Identify and instruct in appropriate isolation precautions for identified infection/condition  Outcome: Progressing  Goal: Absence of fever/infection during neutropenic period  Description: INTERVENTIONS:  - Monitor WBC    Outcome: Progressing     Problem: SAFETY ADULT  Goal: Patient will remain free of falls  Description: INTERVENTIONS:  - Assess patient frequently for physical needs  -  Identify cognitive and physical deficits and behaviors that affect risk of falls    -  [place] fall precautions as indicated by assessment   - Educate patient/family on patient safety including physical limitations  - Instruct patient to call for assistance with activity based on assessment  - Modify environment to reduce risk of injury  - Consider OT/PT consult to assist with strengthening/mobility  Outcome: Progressing  Goal: Maintain or return to baseline ADL function  Description: INTERVENTIONS:  -  Assess patient's ability to carry out ADLs; assess patient's baseline for ADL function and identify physical deficits which impact ability to perform ADLs (bathing, care of mouth/teeth, toileting, grooming, dressing, etc )  - Assess/evaluate cause of self-care deficits   - Assess range of motion  - Assess patient's mobility; develop plan if impaired  - Assess patient's need for assistive devices and provide as appropriate  - Encourage maximum independence but intervene and supervise when necessary  - Involve family in performance of ADLs  - Assess for home care needs following discharge   - Consider OT consult to assist with ADL evaluation and planning for discharge  - Provide patient education as appropriate  Outcome: Progressing  Goal: Maintain or return mobility status to optimal level  Description: INTERVENTIONS:  - Assess patient's baseline mobility status (ambulation, transfers, stairs, etc )    - Identify cognitive and physical deficits and behaviors that affect mobility  - Identify mobility aids required to assist with transfers and/or ambulation (gait belt, sit-to-stand, lift, walker, cane, etc )  - [place] fall precautions as indicated by assessment  - Record patient progress and toleration of activity level on Mobility SBAR; progress patient to next Phase/Stage  - Instruct patient to call for assistance with activity based on assessment  - Consider rehabilitation consult to assist with strengthening/weightbearing, etc   Outcome: Progressing     Problem: DISCHARGE PLANNING  Goal: Discharge to home or other facility with appropriate resources  Description: INTERVENTIONS:  - Identify barriers to discharge w/patient and caregiver  - Arrange for needed discharge resources and transportation as appropriate  - Identify discharge learning needs (meds, wound care, etc )  - Arrange for interpretive services to assist at discharge as needed  - Refer to Case Management Department for coordinating discharge planning if the patient needs post-hospital services based on physician/advanced practitioner order or complex needs related to functional status, cognitive ability, or social support system  Outcome: Progressing     Problem: Knowledge Deficit  Goal: Patient/family/caregiver demonstrates understanding of disease process, treatment plan, medications, and discharge instructions  Description: Complete learning assessment and assess knowledge base    Interventions:  - Provide teaching at level of understanding  - Provide teaching via preferred learning methods  Outcome: Progressing

## 2020-08-05 NOTE — ASSESSMENT & PLAN NOTE
Most likely is demand secondary to AFib with RVR  No significant EKG change regarding ST-T elevation-patient does not complain any chest pain  Patient had previous cardiac catheterization in 2018, which was normal   Troponin: 0 15 X 3> 0 13

## 2020-08-05 NOTE — ASSESSMENT & PLAN NOTE
Atrial fibrillation with rapid ventricular response noted on admission  Prior history of atrial fibrillation dating back to 2018 at which time he had a left atrial appendage thrombus and was initially unable to undergo cardioversion however he was subsequently cardioverted 03/2018 and started on amiodarone for rhythm control  Patient had been on amiodarone 200 mg daily as well as metoprolol succinate 50 mg twice a day and Eliquis anticoagulation but lasts insurance several months ago and has not been on any medications for at least the last 6 months and perhaps longer  CHADSVASc= 1 (LV dysfunction)  Continue Eliquis anticoagulation  Continue metoprolol succinate 50 mg twice daily  KAYLYN guided cardioversion 8/6/2020 was successful in restoring normal sinus rhythm  Short run of SVT/AF this am    Beta blockers were held yesterday due to not meeting blood pressure parameters  Amiodarone 3-6 months only post cardioversion with close watch of TSH  Can reduce amiodarone back to 200 mg daily for discharge  Discussed the need for ETOH cessation  Would have low threshold for ablation given recurrent heart failure in the setting of atrial fibrillation  Eventual sleep study

## 2020-08-05 NOTE — ASSESSMENT & PLAN NOTE
Likely tachycardia induced cardiomyopathy  Echocardiogram 08/05/2020 EF 25-30% but difficult to assess with underlying atrial fibrillation and rapid rates  Would continue GDMT with long acting beta blocker, resume lisinopril if/when renal function improves, add spironolactone if/when renal function improves  Will need repeat echocardiogram in 3 months to see if LVEF improves and if it does not, he will need ICD  No LifeVest at this time as likely his EF will recover rapidly if he maintains normal sinus rhythm

## 2020-08-05 NOTE — ASSESSMENT & PLAN NOTE
Present on admission  Patient received digoxin 250 mcgm X 2, Lopressor 5 mg x 2, Cardizem 15 mg, then 20 mg IV  Patient was also placed on Cardizem drip which discontinued  Currently rate controlled  Continue metoprolol 75 mg p o  B i d -and titrate as long as heart rate tolerated  Lopressor IV as needed  Continue aspirin, Eliquis  As per chart review,Hx of Afib s/p cardioversion; Hx of LA thrombus   Patient was admitted to [facility] in 1/2018 for this where he was found to be in Afib with RVR  He did have an echo that demonstrated LA thrombus so no cardioversion was performed initially  he was rate controlled and anticoagulated with eliquis  Subsequent KAYLYN on 3/2018 demonstrated no thormbus so he was successful cardioverted  [id number]: Moderate spontaneous echo contrast seen in LA appendage  Echogeneic structure seen in appendage concerning for MOLLY thrombus  Left ventricle is mildly dilated  Severely reduced left ventricular systolic function  Global     hypokinesis  Left ventricular ejection fraction is 20%  Severely dilated left atrium  Severely dilated right atrium  Enlarged right ventricular size  Reduced right ventricular systolic function  [facility]      Pending echo report, but as per cardiology note, ejection fraction in between 20-25%  Monitor on telemetry  Cardiology consult appreciated-plan is to do KAYLYN cardioversion on 8/6/20  Maintain potassium more than 4, magnesium more than 2

## 2020-08-05 NOTE — NURSING NOTE
Patient c/o leg cramps, has been happening intermittently today and wife also states patient has restless leg at night during sleep  Notified MD Dr Kellee Gu, new order for requip noted, pt informed of new medication order

## 2020-08-05 NOTE — UTILIZATION REVIEW
Initial Clinical Review    Admission: Date/Time/Statement:   Admission Orders (From admission, onward)     Ordered        08/04/20 1533  Inpatient Admission (expected length of stay for this patient Order details is greater than two midnights)  Once                   Orders Placed This Encounter   Procedures    Inpatient Admission (expected length of stay for this patient Order details is greater than two midnights)     Standing Status:   Standing     Number of Occurrences:   1     Order Specific Question:   Admitting Physician     Answer:   Keiry Mott [[id number]]     Order Specific Question:   Level of Care     Answer:   Med Surg [[id number]]     Order Specific Question:   Estimated length of stay     Answer:   More than 2 Midnights     Order Specific Question:   Certification     Answer:   I certify that inpatient services are medically necessary for this patient for a duration of greater than two midnights  See H&P and MD Progress Notes for additional information about the patient's course of treatment  ED Arrival Information     Expected Arrival Acuity Means of Arrival Escorted By Service Admission Type    8/4/2020 8/4/2020 13:07 Emergent Walk-In Self Hospitalist Emergency    Arrival Complaint    increased sob, hr 172 afib, hx-fell 1-2wks ago        Chief Complaint   Patient presents with    Shortness of Breath     pt c/o increased sob w/intermittent chest pain,dizziness, and ankle swelling for past week  pt states he fell into  drain a week ago and felt sx started at that time  denies travel/n/v/d/fevers/cough     Assessment/Plan:   Has not been taking RX meds for past few months,  Progressively SOB w/intermittent chest pain past week  Admits to some orthopnea and PND  Seen at PCP today, noted to be in AFIB w/'s  (has been cardioverted in past)   -  Sent to ER for further eval      Exam reveals trace edema to b/l LE, resp distress, no rales     Elevated trop 2/2 tachycardia; no significant EKG change    IN ER given  digoxin 250 mcgm X 2, Lopressor 5 mg x 2, Cardizem 15 mg, then 20 mg IV;   placed on Cardizem drip (RI'ed on 8/4  @  1436)      Will place on telemetry,  Wean cardizem gtt as tolerated,  Obtain ECHO, consult cardiology  Maintain K >4, Mg >2  Continue beta-blocker, aspirin, statin, lisinopril      Quiroga Jimbo 8/5  CARDIOLOGY:    (remains in Afib)   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, Bedside EF approx 20 - 25% EF  Elevated trops steady, no evidence ACS  Acute on Chronic Systolic CHF  And  AFIB w/RVR:   Continue Eliquis anticoagulation  Continue metoprolol tartrate but increase to 75 mg twice daily  Will discontinue diltiazem given reduced LVEF  Will plan KAYLYN guided cardioversion 8/6/2020  Amiodarone 3-6 months only post cardioversion  Discussed the need for ETOH cessation  Transition to long acting metoprolol succinate given low EF  Would have low threshold for ablation given recurrent heart failure in the setting of atrial fibrillation     Eventual sleep study         ED Triage Vitals   Temperature Pulse Respirations Blood Pressure SpO2   08/04/20 1316 08/04/20 1316 08/04/20 1316 08/04/20 1316 08/04/20 1316   (!) 97 2 °F (36 2 °C) (!) 181 (!) 25 139/100 99 %      Temp Source Heart Rate Source Patient Position - Orthostatic VS BP Location FiO2 (%)   08/04/20 1316 08/04/20 1316 08/04/20 1316 08/04/20 1316 --   Temporal Monitor Lying Left arm       Pain Score       08/04/20 1620       No Pain          Wt Readings from Last 1 Encounters:   08/05/20 91 1 kg (200 lb 13 4 oz)     Additional Vital Signs:   08/04/20 1445      104   19   116/88   97   99 %   None (Room air)       08/04/20 1430      117Abnormal     21   123/92   104   99 %   None (Room air)       08/04/20 1415      124Abnormal     22   119/94   102   98 %   None (Room air)       08/04/20 1400      123Abnormal     20   118/92   102   100 %   None (Room air)       08/04/20 1345      138Abnormal     23Abnormal     117/89   100   100 %   None (Room air)       08/04/20 1330      155Abnormal     21   114/82   91   99 %             08/04/20 1745      100      125/86                08/04/20 17:36:36      110Abnormal     20   131/86   101   96 %   None (Room air)        08/05/20 03:13:23   97 5 °F (36 4 °C)   92   20   138/96   110   97 %          08/04/20 23:27:26   98 4 °F (36 9 °C)   108Abnormal     18   122/86   98   99 %          08/05/20 15:03:43   97 9 °F (36 6 °C)   85   18   120/82   95   100 %          Pertinent Labs/Diagnostic Test Results:   8/4  cxr - nad  8/4  EKG -  Afib/flutter w/RVR    8/5  ECHO  W/25-30% EF          Results from last 7 days   Lab Units 08/05/20  0518 08/04/20  1323   WBC Thousand/uL 11 10* 11 37*   HEMOGLOBIN g/dL 14 2 15 0   HEMATOCRIT % 44 1 49 3   PLATELETS Thousands/uL 313 359   NEUTROS ABS Thousands/µL 4 84 5 62         Results from last 7 days   Lab Units 08/05/20  0518 08/04/20  1323   SODIUM mmol/L 139 141   POTASSIUM mmol/L 3 6 4 2   CHLORIDE mmol/L 104 105   CO2 mmol/L 26 28   ANION GAP mmol/L 9 8   BUN mg/dL 15 18   CREATININE mg/dL 1 28 1 29   EGFR ml/min/1 73sq m 62 62   CALCIUM mg/dL 8 4 8 6   MAGNESIUM mg/dL 1 9  --      Results from last 7 days   Lab Units 08/05/20  0518 08/04/20  1323   AST U/L 21 27   ALT U/L 34 40   ALK PHOS U/L 150* 175*   TOTAL PROTEIN g/dL 6 4 7 6   ALBUMIN g/dL 2 9* 3 4*   TOTAL BILIRUBIN mg/dL 0 30 0 34         Results from last 7 days   Lab Units 08/05/20  0518 08/04/20  1323   GLUCOSE RANDOM mg/dL 116 76         Results from last 7 days   Lab Units 08/04/20  1323   HEMOGLOBIN A1C % 5 5   EAG mg/dl 111     Results from last 7 days   Lab Units 08/04/20  2330 08/04/20  2007 08/04/20  1701 08/04/20  1323   TROPONIN I ng/mL 0 13* 0 15* 0 15* 0 15*         Results from last 7 days   Lab Units 08/04/20  1323   PROTIME seconds 14 2   INR  1 12   PTT seconds 27     Results from last 7 days   Lab Units 08/05/20  0518 08/04/20  1701   TSH 3RD GENERATON uIU/mL 0 172* 0 147*         Results from last 7 days   Lab Units 08/04/20  1643 08/04/20  1323   LACTIC ACID mmol/L 1 0 2 3*             Results from last 7 days   Lab Units 08/04/20  1323   NT-PRO BNP pg/mL 2,786*             Results from last 7 days   Lab Units 08/04/20  1323   LIPASE u/L 226         ED Treatment:   Medication Administration from 08/04/2020 1104 to 08/04/2020 1601       Date/Time Order Dose Route Action     08/04/2020 1324 diltiazem (CARDIZEM) injection 20 mg 20 mg Intravenous Given     08/04/2020 1344 diltiazem (CARDIZEM) injection 15 mg 15 mg Intravenous Given     08/04/2020 1400 diltiazem (CARDIZEM) 125 mg in sodium chloride 0 9 % 125 mL infusion 5 mg/hr Intravenous New Bag  dc'ed @  1436     08/04/2020 1347 digoxin (LANOXIN) injection 250 mcg 250 mcg Intravenous Given     08/04/2020 1359 furosemide (LASIX) injection 40 mg 40 mg Intravenous Given     08/04/2020 1427 metoprolol (LOPRESSOR) injection 5 mg 5 mg Intravenous Given     08/04/2020 1456 metoprolol (LOPRESSOR) injection 5 mg 5 mg Intravenous Given     08/04/2020 1512 digoxin (LANOXIN) injection 250 mcg 250 mcg Intravenous Given          Past Medical History:   Diagnosis Date    Atrial fibrillation (HCC)     Hyperlipidemia     Overweight     Tachycardia induced cardiomyopathy ([facility] [address] )      Present on Admission:   Atrial fibrillation with RVR (HCC)   Elevated troponin   Acute on chronic systolic congestive heart failure (HCC)   Cardiomyopathy (HCC)   Abnormal TSH      Admitting Diagnosis: SOB (shortness of breath) [L26 74]  Acute systolic congestive heart failure (HCC) [I50 21]  Atrial fibrillation with RVR (HCC) [I48 91]  Elevated troponin I level [R79 89]  Age/Sex: 63 y o  male  Admission Orders:  Scd's;  Telemetry;  Daily wgt;  I/O q shift;  2 G Na diet; echocardiogram, cardiology consult    8/4  @  1738  GIVEN  IV  Lopressor 5 mg      Scheduled Medications:  apixaban, 5 mg, Oral, BID  aspirin, 81 mg, Oral, Daily  atorvastatin, 40 mg, Oral, Daily With Dinner  docusate sodium, 100 mg, Oral, BID  furosemide, 40 mg, Intravenous, BID (diuretic)  lisinopril, 5 mg, Oral, Daily  magnesium oxide, 400 mg, Oral, BID  metoprolol tartrate, 75 mg, Oral, Q12H YUKI  rOPINIRole, 0 5 mg, Oral, HS      Continuous IV Infusions:     PRN Meds:  acetaminophen, 650 mg, Oral, Q6H PRN  aluminum-magnesium hydroxide-simethicone, 30 mL, Oral, Q6H PRN  calcium carbonate, 1,000 mg, Oral, Daily PRN  metoprolol, 5 mg, Intravenous, Q6H PRN  ondansetron, 4 mg, Intravenous, Q6H PRN      Network Utilization Review Department  [email or web address]  ATTENTION: Please call with any questions or concerns to [phone] and carefully listen to the prompts so that you are directed to the right person  All voicemails are confidential   Ty Limb all requests for admission clinical reviews, approved or denied determinations and any other requests to dedicated fax number below belonging to the campus where the patient is receiving treatment   List of dedicated fax numbers for the Facilities:  [address] [place] [address] DENIALS (Administrative/Medical Necessity) [phone]   [address] (Maternity/NICU/Pediatrics) [phone]   [facility] [phone]     [address] [phone]   Roxy Heady [phone]   Hettie Lips [phone]   [address] [phone]   [facility]  [phone]   [address] [facility] [address] [facility]  [address] [facility] [address] [facility] [phone]

## 2020-08-05 NOTE — ASSESSMENT & PLAN NOTE
Wt Readings from Last 3 Encounters:   08/05/20 91 1 kg (200 lb 13 4 oz)     Patient with prior history ejection fraction of 20% noted in 2018 at the time of his diagnosis of atrial fibrillation  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 cardiomyopathy  Cardiac catheterization 02/2018 showed no evidence of coronary artery disease  Patient did have recovery of EF with an echocardiogram 08/2018 showing ejection fraction of 62%  Also question if there could be some component of EtOH induced cardiomyopathy  Would recommend continuing  metoprolol succinate 50 mg twice daily for discharge  Low-dose ACE-I/ARB was initiated  Up titrate if renal function remains stable and blood pressure will allow (was previously lisinopril 10 mg daily)  Would hold lisinopril for now given CASEY  Daily standing weights  Accurate I/O's  Optimize electrolytes with K > 4 and Mg > 2  Patient does not appear grossly volume overloaded will hold furosemide given worsening renal function

## 2020-08-05 NOTE — PLAN OF CARE
Problem: Potential for Falls  Goal: Patient will remain free of falls  Description: INTERVENTIONS:  - Assess patient frequently for physical needs  -  Identify cognitive and physical deficits and behaviors that affect risk of falls    -  [place] fall precautions as indicated by assessment   - Educate patient/family on patient safety including physical limitations  - Instruct patient to call for assistance with activity based on assessment  - Modify environment to reduce risk of injury  - Consider OT/PT consult to assist with strengthening/mobility  Outcome: Progressing     Problem: CARDIOVASCULAR - ADULT  Goal: Maintains optimal cardiac output and hemodynamic stability  Description: INTERVENTIONS:  - Monitor I/O, vital signs and rhythm  - Monitor for S/S and trends of decreased cardiac output  - Administer and titrate ordered vasoactive medications to optimize hemodynamic stability  - Assess quality of pulses, skin color and temperature  - Assess for signs of decreased coronary artery perfusion  - Instruct patient to report change in severity of symptoms  Outcome: Progressing  Goal: Absence of cardiac dysrhythmias or at baseline rhythm  Description: INTERVENTIONS:  - Continuous cardiac monitoring, vital signs, obtain 12 lead EKG if ordered  - Administer antiarrhythmic and heart rate control medications as ordered  - Monitor electrolytes and administer replacement therapy as ordered  Outcome: Progressing     Problem: PAIN - ADULT  Goal: Verbalizes/displays adequate comfort level or baseline comfort level  Description: Interventions:  - Encourage patient to monitor pain and request assistance  - Assess pain using appropriate pain scale  - Administer analgesics based on type and severity of pain and evaluate response  - Implement non-pharmacological measures as appropriate and evaluate response  - Consider cultural and social influences on pain and pain management  - Notify physician/advanced practitioner if interventions unsuccessful or patient reports new pain  Outcome: Progressing     Problem: INFECTION - ADULT  Goal: Absence or prevention of progression during hospitalization  Description: INTERVENTIONS:  - Assess and monitor for signs and symptoms of infection  - Monitor lab/diagnostic results  - Monitor all insertion sites, i e  indwelling lines, tubes, and drains  - Monitor endotracheal if appropriate and nasal secretions for changes in amount and color  - [place] appropriate cooling/warming therapies per order  - Administer medications as ordered  - Instruct and encourage patient and family to use good hand hygiene technique  - Identify and instruct in appropriate isolation precautions for identified infection/condition  Outcome: Progressing     Problem: SAFETY ADULT  Goal: Patient will remain free of falls  Description: INTERVENTIONS:  - Assess patient frequently for physical needs  -  Identify cognitive and physical deficits and behaviors that affect risk of falls    -  [place] fall precautions as indicated by assessment   - Educate patient/family on patient safety including physical limitations  - Instruct patient to call for assistance with activity based on assessment  - Modify environment to reduce risk of injury  - Consider OT/PT consult to assist with strengthening/mobility  Outcome: Progressing  Goal: Maintain or return to baseline ADL function  Description: INTERVENTIONS:  -  Assess patient's ability to carry out ADLs; assess patient's baseline for ADL function and identify physical deficits which impact ability to perform ADLs (bathing, care of mouth/teeth, toileting, grooming, dressing, etc )  - Assess/evaluate cause of self-care deficits   - Assess range of motion  - Assess patient's mobility; develop plan if impaired  - Assess patient's need for assistive devices and provide as appropriate  - Encourage maximum independence but intervene and supervise when necessary  - Involve family in performance of ADLs  - Assess for home care needs following discharge   - Consider OT consult to assist with ADL evaluation and planning for discharge  - Provide patient education as appropriate  Outcome: Progressing  Goal: Maintain or return mobility status to optimal level  Description: INTERVENTIONS:  - Assess patient's baseline mobility status (ambulation, transfers, stairs, etc )    - Identify cognitive and physical deficits and behaviors that affect mobility  - Identify mobility aids required to assist with transfers and/or ambulation (gait belt, sit-to-stand, lift, walker, cane, etc )  - [place] fall precautions as indicated by assessment  - Record patient progress and toleration of activity level on Mobility SBAR; progress patient to next Phase/Stage  - Instruct patient to call for assistance with activity based on assessment  - Consider rehabilitation consult to assist with strengthening/weightbearing, etc   Outcome: Progressing     Problem: DISCHARGE PLANNING  Goal: Discharge to home or other facility with appropriate resources  Description: INTERVENTIONS:  - Identify barriers to discharge w/patient and caregiver  - Arrange for needed discharge resources and transportation as appropriate  - Identify discharge learning needs (meds, wound care, etc )  - Arrange for interpretive services to assist at discharge as needed  - Refer to Case Management Department for coordinating discharge planning if the patient needs post-hospital services based on physician/advanced practitioner order or complex needs related to functional status, cognitive ability, or social support system  Outcome: Progressing     Problem: Knowledge Deficit  Goal: Patient/family/caregiver demonstrates understanding of disease process, treatment plan, medications, and discharge instructions  Description: Complete learning assessment and assess knowledge base    Interventions:  - Provide teaching at level of understanding  - Provide teaching via preferred learning methods  Outcome: Progressing

## 2020-08-06 ENCOUNTER — ANESTHESIA (INPATIENT)
Dept: NON INVASIVE DIAGNOSTICS | Facility: HOSPITAL | Age: 55
DRG: 314 | End: 2020-08-06

## 2020-08-06 ENCOUNTER — ANESTHESIA EVENT (INPATIENT)
Dept: NON INVASIVE DIAGNOSTICS | Facility: HOSPITAL | Age: 55
DRG: 314 | End: 2020-08-06

## 2020-08-06 ENCOUNTER — APPOINTMENT (INPATIENT)
Dept: NON INVASIVE DIAGNOSTICS | Facility: HOSPITAL | Age: 55
DRG: 314 | End: 2020-08-06
Attending: INTERNAL MEDICINE

## 2020-08-06 PROBLEM — I50.9 CHF (CONGESTIVE HEART FAILURE) (HCC): Status: ACTIVE | Noted: 2020-08-06

## 2020-08-06 PROBLEM — I25.10 CAD (CORONARY ARTERY DISEASE): Status: ACTIVE | Noted: 2020-08-06

## 2020-08-06 PROBLEM — I48.91 ATRIAL FIBRILLATION WITH RVR (HCC): Status: RESOLVED | Noted: 2020-08-04 | Resolved: 2020-08-06

## 2020-08-06 LAB
ANION GAP SERPL CALCULATED.3IONS-SCNC: 10 MMOL/L (ref 4–13)
ANION GAP SERPL CALCULATED.3IONS-SCNC: 4 MMOL/L (ref 4–13)
BASOPHILS # BLD AUTO: 0.13 THOUSANDS/ΜL (ref 0–0.1)
BASOPHILS NFR BLD AUTO: 1 % (ref 0–1)
BUN SERPL-MCNC: 23 MG/DL (ref 5–25)
BUN SERPL-MCNC: 23 MG/DL (ref 5–25)
CALCIUM SERPL-MCNC: 9.1 MG/DL (ref 8.3–10.1)
CALCIUM SERPL-MCNC: 9.3 MG/DL (ref 8.3–10.1)
CHLORIDE SERPL-SCNC: 98 MMOL/L (ref 100–108)
CHLORIDE SERPL-SCNC: 99 MMOL/L (ref 100–108)
CO2 SERPL-SCNC: 26 MMOL/L (ref 21–32)
CO2 SERPL-SCNC: 34 MMOL/L (ref 21–32)
CREAT SERPL-MCNC: 1.58 MG/DL (ref 0.6–1.3)
CREAT SERPL-MCNC: 1.65 MG/DL (ref 0.6–1.3)
EOSINOPHIL # BLD AUTO: 0.94 THOUSAND/ΜL (ref 0–0.61)
EOSINOPHIL NFR BLD AUTO: 7 % (ref 0–6)
ERYTHROCYTE [DISTWIDTH] IN BLOOD BY AUTOMATED COUNT: 12.3 % (ref 11.6–15.1)
GFR SERPL CREATININE-BSD FRML MDRD: 46 ML/MIN/1.73SQ M
GFR SERPL CREATININE-BSD FRML MDRD: 48 ML/MIN/1.73SQ M
GLUCOSE SERPL-MCNC: 110 MG/DL (ref 65–140)
GLUCOSE SERPL-MCNC: 88 MG/DL (ref 65–140)
HCT VFR BLD AUTO: 53.5 % (ref 36.5–49.3)
HGB BLD-MCNC: 17.6 G/DL (ref 12–17)
IMM GRANULOCYTES # BLD AUTO: 0.06 THOUSAND/UL (ref 0–0.2)
IMM GRANULOCYTES NFR BLD AUTO: 0 % (ref 0–2)
LYMPHOCYTES # BLD AUTO: 2.85 THOUSANDS/ΜL (ref 0.6–4.47)
LYMPHOCYTES NFR BLD AUTO: 20 % (ref 14–44)
MAGNESIUM SERPL-MCNC: 2.4 MG/DL (ref 1.6–2.6)
MCH RBC QN AUTO: 28.3 PG (ref 26.8–34.3)
MCHC RBC AUTO-ENTMCNC: 32.9 G/DL (ref 31.4–37.4)
MCV RBC AUTO: 86 FL (ref 82–98)
MONOCYTES # BLD AUTO: 1.32 THOUSAND/ΜL (ref 0.17–1.22)
MONOCYTES NFR BLD AUTO: 9 % (ref 4–12)
NEUTROPHILS # BLD AUTO: 8.97 THOUSANDS/ΜL (ref 1.85–7.62)
NEUTS SEG NFR BLD AUTO: 63 % (ref 43–75)
NRBC BLD AUTO-RTO: 0 /100 WBCS
NT-PROBNP SERPL-MCNC: 2009 PG/ML
PLATELET # BLD AUTO: 440 THOUSANDS/UL (ref 149–390)
PMV BLD AUTO: 10.4 FL (ref 8.9–12.7)
POTASSIUM SERPL-SCNC: 4.5 MMOL/L (ref 3.5–5.3)
POTASSIUM SERPL-SCNC: 4.5 MMOL/L (ref 3.5–5.3)
RBC # BLD AUTO: 6.21 MILLION/UL (ref 3.88–5.62)
SODIUM SERPL-SCNC: 135 MMOL/L (ref 136–145)
SODIUM SERPL-SCNC: 136 MMOL/L (ref 136–145)
WBC # BLD AUTO: 14.27 THOUSAND/UL (ref 4.31–10.16)

## 2020-08-06 PROCEDURE — 83735 ASSAY OF MAGNESIUM: CPT | Performed by: INTERNAL MEDICINE

## 2020-08-06 PROCEDURE — 80048 BASIC METABOLIC PNL TOTAL CA: CPT | Performed by: INTERNAL MEDICINE

## 2020-08-06 PROCEDURE — 93312 ECHO TRANSESOPHAGEAL: CPT | Performed by: INTERNAL MEDICINE

## 2020-08-06 PROCEDURE — 99233 SBSQ HOSP IP/OBS HIGH 50: CPT | Performed by: FAMILY MEDICINE

## 2020-08-06 PROCEDURE — 83880 ASSAY OF NATRIURETIC PEPTIDE: CPT | Performed by: INTERNAL MEDICINE

## 2020-08-06 PROCEDURE — 5A2204Z RESTORATION OF CARDIAC RHYTHM, SINGLE: ICD-10-PCS | Performed by: INTERNAL MEDICINE

## 2020-08-06 PROCEDURE — 93312 ECHO TRANSESOPHAGEAL: CPT

## 2020-08-06 PROCEDURE — 92960 CARDIOVERSION ELECTRIC EXT: CPT

## 2020-08-06 PROCEDURE — 85025 COMPLETE CBC W/AUTO DIFF WBC: CPT | Performed by: INTERNAL MEDICINE

## 2020-08-06 PROCEDURE — 93005 ELECTROCARDIOGRAM TRACING: CPT

## 2020-08-06 PROCEDURE — 92960 CARDIOVERSION ELECTRIC EXT: CPT | Performed by: INTERNAL MEDICINE

## 2020-08-06 PROCEDURE — NC001 PR NO CHARGE

## 2020-08-06 PROCEDURE — 93325 DOPPLER ECHO COLOR FLOW MAPG: CPT | Performed by: INTERNAL MEDICINE

## 2020-08-06 PROCEDURE — 99233 SBSQ HOSP IP/OBS HIGH 50: CPT | Performed by: INTERNAL MEDICINE

## 2020-08-06 PROCEDURE — 93320 DOPPLER ECHO COMPLETE: CPT | Performed by: INTERNAL MEDICINE

## 2020-08-06 RX ORDER — GLYCOPYRROLATE 0.2 MG/ML
INJECTION INTRAMUSCULAR; INTRAVENOUS AS NEEDED
Status: DISCONTINUED | OUTPATIENT
Start: 2020-08-06 | End: 2020-08-06

## 2020-08-06 RX ORDER — KETAMINE HYDROCHLORIDE 50 MG/ML
INJECTION, SOLUTION, CONCENTRATE INTRAMUSCULAR; INTRAVENOUS AS NEEDED
Status: DISCONTINUED | OUTPATIENT
Start: 2020-08-06 | End: 2020-08-06

## 2020-08-06 RX ORDER — PROPOFOL 10 MG/ML
INJECTION, EMULSION INTRAVENOUS AS NEEDED
Status: DISCONTINUED | OUTPATIENT
Start: 2020-08-06 | End: 2020-08-06

## 2020-08-06 RX ORDER — MIDAZOLAM HYDROCHLORIDE 2 MG/2ML
INJECTION, SOLUTION INTRAMUSCULAR; INTRAVENOUS AS NEEDED
Status: DISCONTINUED | OUTPATIENT
Start: 2020-08-06 | End: 2020-08-06

## 2020-08-06 RX ORDER — FENTANYL CITRATE 50 UG/ML
INJECTION, SOLUTION INTRAMUSCULAR; INTRAVENOUS AS NEEDED
Status: DISCONTINUED | OUTPATIENT
Start: 2020-08-06 | End: 2020-08-06

## 2020-08-06 RX ORDER — AMIODARONE HYDROCHLORIDE 200 MG/1
200 TABLET ORAL
Status: DISCONTINUED | OUTPATIENT
Start: 2020-08-06 | End: 2020-08-08 | Stop reason: HOSPADM

## 2020-08-06 RX ORDER — METOPROLOL SUCCINATE 50 MG/1
50 TABLET, EXTENDED RELEASE ORAL 2 TIMES DAILY
Status: DISCONTINUED | OUTPATIENT
Start: 2020-08-06 | End: 2020-08-07

## 2020-08-06 RX ORDER — SODIUM CHLORIDE 9 MG/ML
INJECTION, SOLUTION INTRAVENOUS CONTINUOUS PRN
Status: DISCONTINUED | OUTPATIENT
Start: 2020-08-06 | End: 2020-08-06

## 2020-08-06 RX ORDER — FUROSEMIDE 20 MG/1
20 TABLET ORAL DAILY
Status: DISCONTINUED | OUTPATIENT
Start: 2020-08-07 | End: 2020-08-06

## 2020-08-06 RX ADMIN — ATORVASTATIN CALCIUM 40 MG: 40 TABLET, FILM COATED ORAL at 17:22

## 2020-08-06 RX ADMIN — Medication 400 MG: at 08:00

## 2020-08-06 RX ADMIN — SODIUM CHLORIDE: 0.9 INJECTION, SOLUTION INTRAVENOUS at 11:58

## 2020-08-06 RX ADMIN — MIDAZOLAM HYDROCHLORIDE 2 MG: 1 INJECTION, SOLUTION INTRAMUSCULAR; INTRAVENOUS at 12:05

## 2020-08-06 RX ADMIN — PROPOFOL 20 MG: 10 INJECTION, EMULSION INTRAVENOUS at 12:09

## 2020-08-06 RX ADMIN — FUROSEMIDE 40 MG: 10 INJECTION, SOLUTION INTRAMUSCULAR; INTRAVENOUS at 07:56

## 2020-08-06 RX ADMIN — APIXABAN 5 MG: 5 TABLET, FILM COATED ORAL at 08:00

## 2020-08-06 RX ADMIN — LISINOPRIL 5 MG: 5 TABLET ORAL at 08:00

## 2020-08-06 RX ADMIN — FENTANYL CITRATE 50 MCG: 50 INJECTION, SOLUTION INTRAMUSCULAR; INTRAVENOUS at 12:05

## 2020-08-06 RX ADMIN — SODIUM CHLORIDE 500 ML: 0.9 INJECTION, SOLUTION INTRAVENOUS at 13:44

## 2020-08-06 RX ADMIN — AMIODARONE HYDROCHLORIDE 200 MG: 200 TABLET ORAL at 13:40

## 2020-08-06 RX ADMIN — METOPROLOL TARTRATE 75 MG: 50 TABLET, FILM COATED ORAL at 08:00

## 2020-08-06 RX ADMIN — KETAMINE HYDROCHLORIDE 40 MG: 50 INJECTION INTRAMUSCULAR; INTRAVENOUS at 12:05

## 2020-08-06 RX ADMIN — ROPINIROLE HYDROCHLORIDE 0.5 MG: 0.25 TABLET, FILM COATED ORAL at 21:19

## 2020-08-06 RX ADMIN — GLYCOPYRROLATE 0.2 MG: 0.2 INJECTION, SOLUTION INTRAMUSCULAR; INTRAVENOUS at 12:05

## 2020-08-06 RX ADMIN — ASPIRIN 81 MG: 81 TABLET, COATED ORAL at 08:00

## 2020-08-06 RX ADMIN — PHENYLEPHRINE HYDROCHLORIDE 50 MCG/MIN: 10 INJECTION INTRAVENOUS at 12:03

## 2020-08-06 RX ADMIN — AMIODARONE HYDROCHLORIDE 200 MG: 200 TABLET ORAL at 17:22

## 2020-08-06 RX ADMIN — Medication 400 MG: at 17:22

## 2020-08-06 RX ADMIN — APIXABAN 5 MG: 5 TABLET, FILM COATED ORAL at 17:22

## 2020-08-06 NOTE — ANESTHESIA PREPROCEDURE EVALUATION
Procedure:  KAYLYN    Relevant Problems   ANESTHESIA  Echo this wek- EF 20%  RV dilated with decreased function      CARDIO   (+) Acute on chronic systolic congestive heart failure (HCC)   (+) Atrial fibrillation with RVR (HCC) (Resolved)   (+) CAD (coronary artery disease)   (+) CHF (congestive heart failure) (HCC)      Other   (+) Cardiomyopathy (HCC)   (+) Elevated troponin             Anesthesia Plan  ASA Score- 4     Anesthesia Type- IV sedation with anesthesia with ASA Monitors  Additional Monitors:   Airway Plan:           Plan Factors-    Chart reviewed  EKG reviewed  Existing labs reviewed  Patient summary reviewed  Induction- intravenous  Postoperative Plan-     Informed Consent- Anesthetic plan and risks discussed with patient  I personally reviewed this patient with the CRNA  Discussed and agreed on the Anesthesia Plan with the CRNA             High risk of morbidity and mortality  Bi ventricular failure  Plan pressor support and cautious use of anesthetic agents

## 2020-08-06 NOTE — PROGRESS NOTES
Progress Note:Cardiology  Avtar Shaw 1965, 63 y o  male MRN: [id number]    Unit/Bed#: -01 Encounter: [id number]  Attending Physician: Hadley Isabel MD   Primary Care Provider: Terry Hewitt DO   Date admitted to hospital: 8/4/2020  Length of stay: 2         Cardiomyopathy [facility])  Assessment & Plan  Likely tachycardia induced cardiomyopathy  Bedside EF approximately 20-25% with full report pending  See discussion under acute on chronic systolic heart failure  Acute on chronic systolic congestive heart failure (HCC)  Assessment & Plan  Wt Readings from Last 3 Encounters:   08/05/20 91 1 kg (200 lb 13 4 oz)     Patient with prior history ejection fraction of 20% noted in 2018 at the time of his diagnosis of atrial fibrillation  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 cardiomyopathy  Cardiac catheterization 02/2018 showed no evidence of coronary artery disease  Patient did have recovery of EF with an echocardiogram 08/2018 showing ejection fraction of 62%  Also question if there could be some component of EtOH induced cardiomyopathy  Would recommend up titration of metoprolol tartrate and then transitioning to metoprolol succinate for discharge  Low-dose ACE-I/ARB was initiated  Up titrate if renal function remains stable and blood pressure will allow (was previously lisinopril 10 mg daily)  Daily standing weights  Accurate I/O's  Optimize electrolytes with K > 4 and Mg > 2  Patient does not appear grossly volume overloaded and likely can transition to oral furosemide in am            Elevated troponin  Assessment & Plan  Elevated troponin (flat trend) secondary to demand in the setting of rapid atrial fibrillation  Troponin 0 15-0 15-0  13  No evidence of ACS  Normal coronary arteries on cardiac catheterization 02/2018  Can consider outpatient ischemic work up         * Atrial fibrillation with RVR ([facility])resolved as of 8/6/2020  Assessment & Plan  Atrial fibrillation with rapid ventricular response noted on admission  Prior history of atrial fibrillation dating back to 2018 at which time he had a left atrial appendage thrombus and was initially unable to undergo cardioversion however he was subsequently cardioverted 03/2018 and started on amiodarone for rhythm control  Patient had been on amiodarone 200 mg daily as well as metoprolol succinate 50 mg twice a day and Eliquis anticoagulation but lasts insurance several months ago and has not been on any medications for at least the last 6 months and perhaps longer  CHADSVASc= 1 (LV dysfunction)  Continue Eliquis anticoagulation  Continue metoprolol tartrate but increase to 75 mg twice daily  Will discontinue diltiazem given reduced LVEF  Will plan KAYLYN guided cardioversion 8/6/2020  Amiodarone 3-6 months only post cardioversion  Discussed the need for ETOH cessation  Transition to long acting metoprolol succinate given low EF  Would have low threshold for ablation given recurrent heart failure in the setting of atrial fibrillation  Eventual sleep study  Subjective:   Patient seen and examined  No significant events overnight  Heart rates remain elevated with any type of activity  Highest heart rate into the 170s  Patient states that he still has some dyspnea on exertion upon ambulating but feels that his breathing has improved since admission  He denies any chest pain  He does report good urine output with furosemide  Review of Systems   Constitution: Positive for malaise/fatigue  Negative for diaphoresis and weight gain  HENT: Negative for hearing loss and tinnitus  Cardiovascular: Negative for chest pain, claudication, dyspnea on exertion, leg swelling, orthopnea and palpitations  Respiratory: Positive for shortness of breath (Dyspnea on exertion)  Negative for cough and snoring  Gastrointestinal: Negative for abdominal pain, nausea and vomiting  Neurological: Negative for dizziness and light-headedness  All other systems reviewed and are negative  Objective:     Vitals: Blood pressure 100/58, pulse (!) 127, temperature 97 5 °F (36 4 °C), temperature source Oral, resp  rate 22, height 6' 1" (1 854 m), weight 85 7 kg (189 lb), SpO2 98 %  , Body mass index is 24 94 kg/m² ,     Orthostatic Blood Pressures      Most Recent Value   Blood Pressure  100/58 filed at 08/06/2020 1204   Patient Position - Orthostatic VS  Lying filed at 08/04/2020 2327          Physical Exam  Vitals signs reviewed  Constitutional:       Appearance: He is well-developed  HENT:      Head: Normocephalic and atraumatic  Neck:      Vascular: No JVD  Cardiovascular:      Rate and Rhythm: Tachycardia present  Rhythm regularly irregular  Heart sounds: No murmur  Pulmonary:      Effort: Pulmonary effort is normal       Breath sounds: Normal breath sounds  No rales  Skin:     General: Skin is warm and dry  Neurological:      Mental Status: He is alert and oriented to person, place, and time              Intake/Output Summary (Last 24 hours) at 8/6/2020 1228  Last data filed at 8/5/2020 2000  Gross per 24 hour   Intake 480 ml   Output 2175 ml   Net -1695 ml       Weight (last 2 days)     Date/Time   Weight    08/06/20 1121   85 7 (189)    08/06/20 0600   90 5 (199 6)    08/05/20 0521   91 1 (200 84)    08/04/20 1644   94 8 (209)    08/04/20 1316   95 2 (209 88)                 Medications:      Current Facility-Administered Medications:     acetaminophen (TYLENOL) tablet 650 mg, 650 mg, Oral, Q6H PRN, Jomar Mays MD    aluminum-magnesium hydroxide-simethicone (MYLANTA) 200-200-20 mg/5 mL oral suspension 30 mL, 30 mL, Oral, Q6H PRN, Jomar Mays MD    apixaban (ELIQUIS) tablet 5 mg, 5 mg, Oral, BID, Dmitriy Mays MD, 5 mg at 08/06/20 0800    atorvastatin (LIPITOR) tablet 40 mg, 40 mg, Oral, Daily With Vicente Hazel MD, 40 mg at 08/05/20 1719    calcium carbonate (TUMS) chewable tablet 1,000 mg, 1,000 mg, Oral, Daily PRN, Brandon White MD    docusate sodium (COLACE) capsule 100 mg, 100 mg, Oral, BID, Brandon White MD  [employer]  [START ON 8/7/2020] furosemide (LASIX) tablet 20 mg, 20 mg, Oral, Daily, Mary Garcia DO    lisinopril (ZESTRIL) tablet 5 mg, 5 mg, Oral, Daily, Dmitriy Mays MD, 5 mg at 08/06/20 0800    magnesium oxide (MAG-OX) tablet 400 mg, 400 mg, Oral, BID, Dorothy Mays MD, 400 mg at 08/06/20 0800    metoprolol (LOPRESSOR) injection 5 mg, 5 mg, Intravenous, Q6H PRN, Dorothy Mays MD, 5 mg at 08/05/20 2143    metoprolol tartrate (LOPRESSOR) tablet 75 mg, 75 mg, Oral, Q12H [address], Mary Garcia DO, 75 mg at 08/06/20 0800    ondansetron (ZOFRAN) injection 4 mg, 4 mg, Intravenous, Q6H PRN, Dorothy Mays MD    rOPINIRole (REQUIP) tablet 0 5 mg, 0 5 mg, Oral, HS, Jazzy Collins MD, 0 5 mg at 08/05/20 2110    Facility-Administered Medications Ordered in Other Encounters:     fentanyl citrate (PF) 100 MCG/2ML, , Intravenous, PRN, Dennis Macdonald CRNA, 50 mcg at 08/06/20 1205    glycopyrrolate (ROBINUL) injection, , , PRN, Dennis Macdonald CRNA, 0 2 mg at 08/06/20 1205    ketamine (KETALAR) 50 mg/mL, , Intravenous, PRN, Dennis Macdonald CRNA, 40 mg at 08/06/20 1205    midazolam (VERSED) injection, , Intravenous, PRN, Dennis Macdonald CRNA, 2 mg at 08/06/20 1205    phenylephrine (AGNES-SYNEPHRINE) 50 mg (STANDARD CONCENTRATION) in sodium chloride 0 9% 250 mL, , , Continuous PRN, Dennis Macdonald CRNA, Stopped at 08/06/20 1221    propofol (DIPRIVAN) 200 MG/20ML bolus injection, , Intravenous, PRN, Dennis Macdonald CRNA, 20 mg at 08/06/20 1209    sodium chloride 0 9 % infusion, , , Continuous PRN, Dennis Macdonald CRNA     Labs & Results:    Results from last 7 days   Lab Units 08/04/20  2330 08/04/20  2007 08/04/20  1701   TROPONIN I ng/mL 0 13* 0 15* 0 15*     Results from last 7 days   Lab Units 08/06/20  1000 08/05/20  0518 08/04/20  1323   WBC Thousand/uL 14 27* 11 10* 11 37*   HEMOGLOBIN g/dL 17 6* 14 2 15 0   HEMATOCRIT % 53 5* 44 1 49 3   PLATELETS Thousands/uL 440* 313 359     Results from last 7 days   Lab Units 08/04/20  1323   TRIGLYCERIDES mg/dL 111   HDL mg/dL 40     Results from last 7 days   Lab Units 08/06/20  1001 08/05/20  0518 08/04/20  1323   POTASSIUM mmol/L 4 5 3 6 4 2   CHLORIDE mmol/L 99* 104 105   CO2 mmol/L 26 26 28   BUN mg/dL 23 15 18   CREATININE mg/dL 1 58* 1 28 1 29   CALCIUM mg/dL 9 1 8 4 8 6   ALK PHOS U/L  --  150* 175*   ALT U/L  --  34 40   AST U/L  --  21 27     Results from last 7 days   Lab Units 08/04/20  1323   INR  1 12   PTT seconds 27     Results from last 7 days   Lab Units 08/06/20  1001 08/05/20  0518   MAGNESIUM mg/dL 2 4 1 9     Results from last 7 days   Lab Units 08/06/20  1001 08/04/20  1323   NT-PRO BNP pg/mL 2,009* 2,786*              Counseling / Coordination of Care  Total floor / unit time spent today 20 minutes  Greater than 50% of total time was spent with the patient and / or family counseling and / or coordination of care  A description of the counseling / coordination of care: Discussed with Dr Jennifer Paiz

## 2020-08-06 NOTE — ASSESSMENT & PLAN NOTE
Present on admission  Patient received digoxin 250 mcgm X 2, Lopressor 5 mg x 2, Cardizem 15 mg, then 20 mg IV  Patient was also placed on Cardizem drip which discontinued  Currently rate controlled  Continue metoprolol 75 mg p o  B i d -and titrate as long as heart rate tolerated  Lopressor IV as needed  Continue aspirin, Eliquis  As per chart review,Hx of Afib s/p cardioversion; Hx of LA thrombus   Patient was admitted to [facility] in 1/2018 for this where he was found to be in Afib with RVR  He did have an echo that demonstrated LA thrombus so no cardioversion was performed initially  he was rate controlled and anticoagulated with eliquis  Subsequent KAYLYN on 3/2018 demonstrated no thormbus so he was successful cardioverted  [id number]: Moderate spontaneous echo contrast seen in LA appendage  Echogeneic structure seen in appendage concerning for MOLLY thrombus  Left ventricle is mildly dilated  Severely reduced left ventricular systolic function  Global     hypokinesis  Left ventricular ejection fraction is 20%  Severely dilated left atrium  Severely dilated right atrium  Enlarged right ventricular size  Reduced right ventricular systolic function        S/P KAYLYN Cardioversion (8/6/20) -a single synchronized biphasic shock at 150J, converted in sinus rhythm   Maintain potassium more than 4, magnesium more than 2  Repeat ECHO (8/5/20):     LEFT VENTRICLE:  The ventricle was mildly dilated  Systolic function was moderately reduced  Ejection fraction was estimated in the range of 25 % to 30 %  There was diffuse hypokinesis  Wall thickness was at the upper limits of normal   Left ventricular diastolic function not assessed due to atrial fibrillation      RIGHT VENTRICLE:  The ventricle was dilated  Systolic function was reduced  TAPSE 1 3      LEFT ATRIUM:  The atrium was moderately dilated      RIGHT ATRIUM:  The atrium was moderately dilated      MITRAL VALVE:  There was moderate regurgitation    The regurgitant jet was centrally directed      TRICUSPID VALVE:  There was mild to moderate regurgitation  Estimated peak PA pressure was 35 mmHg  Assuming a right atrial pressure of 15 mmHg      IVC, HEPATIC VEINS:  The inferior vena cava was dilated  Respirophasic changes in dimension were absent      COMPARISONS:  Compared to limited echocardiogram 8/22/2018, the EF is signicantly lower  Previous EF 62 5%

## 2020-08-06 NOTE — ASSESSMENT & PLAN NOTE
Most likely secondary to tachyarrhythmia related  Previous echocardiogram shows: Moderate spontaneous echo contrast seen in LA appendage  Echogeneic structure seen in appendage concerning for MOLLY thrombus  Left ventricle is mildly dilated  Severely reduced left ventricular systolic function  Global     hypokinesis  Left ventricular ejection fraction is 20%  Severely dilated left atrium  Severely dilated right atrium  Enlarged right ventricular size  Reduced right ventricular systolic function      Cardiology consult appreciated  Continue beta-blocker, aspirin, statin,   lisinopril-discontinued due to elevated creatinine  ECHO (8/5/20):     LEFT VENTRICLE:  The ventricle was mildly dilated  Systolic function was moderately reduced  Ejection fraction was estimated in the range of 25 % to 30 %  There was diffuse hypokinesis  Wall thickness was at the upper limits of normal   Left ventricular diastolic function not assessed due to atrial fibrillation      RIGHT VENTRICLE:  The ventricle was dilated  Systolic function was reduced  TAPSE 1 3      LEFT ATRIUM:  The atrium was moderately dilated      RIGHT ATRIUM:  The atrium was moderately dilated      MITRAL VALVE:  There was moderate regurgitation  The regurgitant jet was centrally directed      TRICUSPID VALVE:  There was mild to moderate regurgitation  Estimated peak PA pressure was 35 mmHg  Assuming a right atrial pressure of 15 mmHg      IVC, HEPATIC VEINS:  The inferior vena cava was dilated  Respirophasic changes in dimension were absent      COMPARISONS:  Compared to limited echocardiogram 8/22/2018, the EF is signicantly lower  Previous EF 62 5%

## 2020-08-06 NOTE — PLAN OF CARE
Problem: Potential for Falls  Goal: Patient will remain free of falls  Description: INTERVENTIONS:  - Assess patient frequently for physical needs  -  Identify cognitive and physical deficits and behaviors that affect risk of falls    -  [place] fall precautions as indicated by assessment   - Educate patient/family on patient safety including physical limitations  - Instruct patient to call for assistance with activity based on assessment  - Modify environment to reduce risk of injury  - Consider OT/PT consult to assist with strengthening/mobility  Outcome: Progressing     Problem: CARDIOVASCULAR - ADULT  Goal: Maintains optimal cardiac output and hemodynamic stability  Description: INTERVENTIONS:  - Monitor I/O, vital signs and rhythm  - Monitor for S/S and trends of decreased cardiac output  - Administer and titrate ordered vasoactive medications to optimize hemodynamic stability  - Assess quality of pulses, skin color and temperature  - Assess for signs of decreased coronary artery perfusion  - Instruct patient to report change in severity of symptoms  Outcome: Progressing  Goal: Absence of cardiac dysrhythmias or at baseline rhythm  Description: INTERVENTIONS:  - Continuous cardiac monitoring, vital signs, obtain 12 lead EKG if ordered  - Administer antiarrhythmic and heart rate control medications as ordered  - Monitor electrolytes and administer replacement therapy as ordered  Outcome: Progressing     Problem: PAIN - ADULT  Goal: Verbalizes/displays adequate comfort level or baseline comfort level  Description: Interventions:  - Encourage patient to monitor pain and request assistance  - Assess pain using appropriate pain scale  - Administer analgesics based on type and severity of pain and evaluate response  - Implement non-pharmacological measures as appropriate and evaluate response  - Consider cultural and social influences on pain and pain management  - Notify physician/advanced practitioner if interventions unsuccessful or patient reports new pain  Outcome: Progressing     Problem: INFECTION - ADULT  Goal: Absence or prevention of progression during hospitalization  Description: INTERVENTIONS:  - Assess and monitor for signs and symptoms of infection  - Monitor lab/diagnostic results  - Monitor all insertion sites, i e  indwelling lines, tubes, and drains  - Monitor endotracheal if appropriate and nasal secretions for changes in amount and color  - [place] appropriate cooling/warming therapies per order  - Administer medications as ordered  - Instruct and encourage patient and family to use good hand hygiene technique  - Identify and instruct in appropriate isolation precautions for identified infection/condition  Outcome: Progressing  Goal: Absence of fever/infection during neutropenic period  Description: INTERVENTIONS:  - Monitor WBC    Outcome: Progressing     Problem: SAFETY ADULT  Goal: Patient will remain free of falls  Description: INTERVENTIONS:  - Assess patient frequently for physical needs  -  Identify cognitive and physical deficits and behaviors that affect risk of falls    -  [place] fall precautions as indicated by assessment   - Educate patient/family on patient safety including physical limitations  - Instruct patient to call for assistance with activity based on assessment  - Modify environment to reduce risk of injury  - Consider OT/PT consult to assist with strengthening/mobility  Outcome: Progressing  Goal: Maintain or return to baseline ADL function  Description: INTERVENTIONS:  -  Assess patient's ability to carry out ADLs; assess patient's baseline for ADL function and identify physical deficits which impact ability to perform ADLs (bathing, care of mouth/teeth, toileting, grooming, dressing, etc )  - Assess/evaluate cause of self-care deficits   - Assess range of motion  - Assess patient's mobility; develop plan if impaired  - Assess patient's need for assistive devices and provide as appropriate  - Encourage maximum independence but intervene and supervise when necessary  - Involve family in performance of ADLs  - Assess for home care needs following discharge   - Consider OT consult to assist with ADL evaluation and planning for discharge  - Provide patient education as appropriate  Outcome: Progressing  Goal: Maintain or return mobility status to optimal level  Description: INTERVENTIONS:  - Assess patient's baseline mobility status (ambulation, transfers, stairs, etc )    - Identify cognitive and physical deficits and behaviors that affect mobility  - Identify mobility aids required to assist with transfers and/or ambulation (gait belt, sit-to-stand, lift, walker, cane, etc )  - [place] fall precautions as indicated by assessment  - Record patient progress and toleration of activity level on Mobility SBAR; progress patient to next Phase/Stage  - Instruct patient to call for assistance with activity based on assessment  - Consider rehabilitation consult to assist with strengthening/weightbearing, etc   Outcome: Progressing     Problem: DISCHARGE PLANNING  Goal: Discharge to home or other facility with appropriate resources  Description: INTERVENTIONS:  - Identify barriers to discharge w/patient and caregiver  - Arrange for needed discharge resources and transportation as appropriate  - Identify discharge learning needs (meds, wound care, etc )  - Arrange for interpretive services to assist at discharge as needed  - Refer to Case Management Department for coordinating discharge planning if the patient needs post-hospital services based on physician/advanced practitioner order or complex needs related to functional status, cognitive ability, or social support system  Outcome: Progressing     Problem: Knowledge Deficit  Goal: Patient/family/caregiver demonstrates understanding of disease process, treatment plan, medications, and discharge instructions  Description: Complete learning assessment and assess knowledge base    Interventions:  - Provide teaching at level of understanding  - Provide teaching via preferred learning methods  Outcome: Progressing

## 2020-08-06 NOTE — SOCIAL WORK
Current LOS 2 days  CM completed chart review  Pt case reviewed with attending Dr Cara Green  Pt having KAYLYN today  Possibility of d/c following procedure  CM and attending awaiting recommendations from Dr Juan A Aldrich for d/c medications  Eliquis may not be an immediate option as pt does not currently have insurance and d/t previously being on Eliquis, pt will not qualify for Free 30-day trial  Dr Juan A Aldrich did not feel as though coumadin was an appropriate alternative d/t ETOH use  CM did contact [facility] home [facility]  Per Jeni Cisneros, pta has not had any prescriptions filled there in over year  He has had the Eliquis filled there in the past  Jeni Cisneros states she will run script and the free trial offer to see if pt would qualify  CM requested attending send or print script  CM f/u with [employer], spoke to Kateryna López and coupon received  Pt does qualify for 30 day free trial of Eliquis  CM notified attending Dr Cara Green and cardiologist Dr Juan A Aldrich  (via tiger text)  CM contacted Jarrell Figueroa to schedule follow up visit  Information added to AVS      CM will continue to follow medical course and assist with d/c planning

## 2020-08-06 NOTE — INTERVAL H&P NOTE
H&P reviewed  After examining the patient I find no changes in the patients condition since the H&P had been written      Vitals:    08/06/20 1230   BP: 96/62   Pulse: 67   Resp: 20   Temp: 97 6 °F (36 4 °C)   SpO2: 97%

## 2020-08-06 NOTE — PROCEDURES
Procedure: KAYLYN/ DCCV    Indication:  Atrial fibrillation with rapid ventricular response  :  Anne Mcdonald DO    Anti-coagulation:  Eliquis anticoagulation  Anesthesia: Sedation provided by anesthesiology  KAYLYN findings:  Depressed LV systolic function  No left atrial thrombus  Spontaneous echo contrast noted in the left atrium as well as a left atrial appendage  Electrical Pad Placement: Anteroposteriorly interscapular region and upper sternum  Successful cardioversion following a single synchronized biphasic shock at 150J  Complications: None    Post Procedure Findings: Sinus rhythm documented by 12 lead ECG  Disposition: Return to floor

## 2020-08-06 NOTE — PLAN OF CARE
Problem: Potential for Falls  Goal: Patient will remain free of falls  Description: INTERVENTIONS:  - Assess patient frequently for physical needs  -  Identify cognitive and physical deficits and behaviors that affect risk of falls    -  [place] fall precautions as indicated by assessment   - Educate patient/family on patient safety including physical limitations  - Instruct patient to call for assistance with activity based on assessment  - Modify environment to reduce risk of injury  - Consider OT/PT consult to assist with strengthening/mobility  Outcome: Progressing     Problem: CARDIOVASCULAR - ADULT  Goal: Maintains optimal cardiac output and hemodynamic stability  Description: INTERVENTIONS:  - Monitor I/O, vital signs and rhythm  - Monitor for S/S and trends of decreased cardiac output  - Administer and titrate ordered vasoactive medications to optimize hemodynamic stability  - Assess quality of pulses, skin color and temperature  - Assess for signs of decreased coronary artery perfusion  - Instruct patient to report change in severity of symptoms  Outcome: Progressing  Goal: Absence of cardiac dysrhythmias or at baseline rhythm  Description: INTERVENTIONS:  - Continuous cardiac monitoring, vital signs, obtain 12 lead EKG if ordered  - Administer antiarrhythmic and heart rate control medications as ordered  - Monitor electrolytes and administer replacement therapy as ordered  Outcome: Progressing     Problem: PAIN - ADULT  Goal: Verbalizes/displays adequate comfort level or baseline comfort level  Description: Interventions:  - Encourage patient to monitor pain and request assistance  - Assess pain using appropriate pain scale  - Administer analgesics based on type and severity of pain and evaluate response  - Implement non-pharmacological measures as appropriate and evaluate response  - Consider cultural and social influences on pain and pain management  - Notify physician/advanced practitioner if interventions unsuccessful or patient reports new pain  Outcome: Progressing     Problem: INFECTION - ADULT  Goal: Absence or prevention of progression during hospitalization  Description: INTERVENTIONS:  - Assess and monitor for signs and symptoms of infection  - Monitor lab/diagnostic results  - Monitor all insertion sites, i e  indwelling lines, tubes, and drains  - Monitor endotracheal if appropriate and nasal secretions for changes in amount and color  - [place] appropriate cooling/warming therapies per order  - Administer medications as ordered  - Instruct and encourage patient and family to use good hand hygiene technique  - Identify and instruct in appropriate isolation precautions for identified infection/condition  Outcome: Progressing  Goal: Absence of fever/infection during neutropenic period  Description: INTERVENTIONS:  - Monitor WBC    Outcome: Progressing     Problem: SAFETY ADULT  Goal: Patient will remain free of falls  Description: INTERVENTIONS:  - Assess patient frequently for physical needs  -  Identify cognitive and physical deficits and behaviors that affect risk of falls  -  [place] fall precautions as indicated by assessment   - Educate patient/family on patient safety including physical limitations  - Instruct patient to call for assistance with activity based on assessment  - Modify environment to reduce risk of injury  - Consider OT/PT consult to assist with strengthening/mobility  Outcome: Progressing  Goal: Maintain or return to baseline ADL function  Description: INTERVENTIONS:  - Assess patient frequently for physical needs  -  Identify cognitive and physical deficits and behaviors that affect risk of falls    -  [place] fall precautions as indicated by assessment   - Educate patient/family on patient safety including physical limitations  - Instruct patient to call for assistance with activity based on assessment  - Modify environment to reduce risk of injury  - Consider OT/PT consult to assist with strengthening/mobility  Outcome: Progressing  Goal: Maintain or return mobility status to optimal level  Description: INTERVENTIONS:  -  Assess patient's ability to carry out ADLs; assess patient's baseline for ADL function and identify physical deficits which impact ability to perform ADLs (bathing, care of mouth/teeth, toileting, grooming, dressing, etc )  - Assess/evaluate cause of self-care deficits   - Assess range of motion  - Assess patient's mobility; develop plan if impaired  - Assess patient's need for assistive devices and provide as appropriate  - Encourage maximum independence but intervene and supervise when necessary  - Involve family in performance of ADLs  - Assess for home care needs following discharge   - Consider OT consult to assist with ADL evaluation and planning for discharge  - Provide patient education as appropriate  Outcome: Progressing     Problem: DISCHARGE PLANNING  Goal: Discharge to home or other facility with appropriate resources  Description: INTERVENTIONS:  - Identify barriers to discharge w/patient and caregiver  - Arrange for needed discharge resources and transportation as appropriate  - Identify discharge learning needs (meds, wound care, etc )  - Arrange for interpretive services to assist at discharge as needed  - Refer to Case Management Department for coordinating discharge planning if the patient needs post-hospital services based on physician/advanced practitioner order or complex needs related to functional status, cognitive ability, or social support system  Outcome: Progressing     Problem: Knowledge Deficit  Goal: Patient/family/caregiver demonstrates understanding of disease process, treatment plan, medications, and discharge instructions  Description: Complete learning assessment and assess knowledge base    Interventions:  - Provide teaching at level of understanding  - Provide teaching via preferred learning methods  Outcome: Progressing

## 2020-08-06 NOTE — ANESTHESIA POSTPROCEDURE EVALUATION
Post-Op Assessment Note    CV Status:  Stable  Pain Score: 0    Pain management: adequate     Mental Status:  Alert and awake   Hydration Status:  Euvolemic   PONV Controlled:  Controlled   Airway Patency:  Patent      Post Op Vitals Reviewed: Yes      Staff: CRNA         No complications documented      BP (P) 96/62 (08/06/20 1230)    Temp (!) (P) 96 9 °F (36 1 °C) (08/06/20 1230)    Pulse (P) 67 (08/06/20 1230)   Resp (P) 20 (08/06/20 1230)    SpO2 (P) 97 % (08/06/20 1230)

## 2020-08-06 NOTE — PLAN OF CARE
Problem: Potential for Falls  Goal: Patient will remain free of falls  Description: INTERVENTIONS:  - Assess patient frequently for physical needs  -  Identify cognitive and physical deficits and behaviors that affect risk of falls    -  [place] fall precautions as indicated by assessment   - Educate patient/family on patient safety including physical limitations  - Instruct patient to call for assistance with activity based on assessment  - Modify environment to reduce risk of injury  - Consider OT/PT consult to assist with strengthening/mobility  Outcome: Progressing     Problem: CARDIOVASCULAR - ADULT  Goal: Maintains optimal cardiac output and hemodynamic stability  Description: INTERVENTIONS:  - Monitor I/O, vital signs and rhythm  - Monitor for S/S and trends of decreased cardiac output  - Administer and titrate ordered vasoactive medications to optimize hemodynamic stability  - Assess quality of pulses, skin color and temperature  - Assess for signs of decreased coronary artery perfusion  - Instruct patient to report change in severity of symptoms  Outcome: Progressing  Goal: Absence of cardiac dysrhythmias or at baseline rhythm  Description: INTERVENTIONS:  - Continuous cardiac monitoring, vital signs, obtain 12 lead EKG if ordered  - Administer antiarrhythmic and heart rate control medications as ordered  - Monitor electrolytes and administer replacement therapy as ordered  Outcome: Progressing     Problem: PAIN - ADULT  Goal: Verbalizes/displays adequate comfort level or baseline comfort level  Description: Interventions:  - Encourage patient to monitor pain and request assistance  - Assess pain using appropriate pain scale  - Administer analgesics based on type and severity of pain and evaluate response  - Implement non-pharmacological measures as appropriate and evaluate response  - Consider cultural and social influences on pain and pain management  - Notify physician/advanced practitioner if interventions unsuccessful or patient reports new pain  Outcome: Progressing     Problem: SAFETY ADULT  Goal: Patient will remain free of falls  Description: INTERVENTIONS:  - Assess patient frequently for physical needs  -  Identify cognitive and physical deficits and behaviors that affect risk of falls    -  [place] fall precautions as indicated by assessment   - Educate patient/family on patient safety including physical limitations  - Instruct patient to call for assistance with activity based on assessment  - Modify environment to reduce risk of injury  - Consider OT/PT consult to assist with strengthening/mobility  Outcome: Progressing  Goal: Maintain or return to baseline ADL function  Description: INTERVENTIONS:  -  Assess patient's ability to carry out ADLs; assess patient's baseline for ADL function and identify physical deficits which impact ability to perform ADLs (bathing, care of mouth/teeth, toileting, grooming, dressing, etc )  - Assess/evaluate cause of self-care deficits   - Assess range of motion  - Assess patient's mobility; develop plan if impaired  - Assess patient's need for assistive devices and provide as appropriate  - Encourage maximum independence but intervene and supervise when necessary  - Involve family in performance of ADLs  - Assess for home care needs following discharge   - Consider OT consult to assist with ADL evaluation and planning for discharge  - Provide patient education as appropriate  Outcome: Progressing  Goal: Maintain or return mobility status to optimal level  Description: INTERVENTIONS:  - Assess patient's baseline mobility status (ambulation, transfers, stairs, etc )    - Identify cognitive and physical deficits and behaviors that affect mobility  - Identify mobility aids required to assist with transfers and/or ambulation (gait belt, sit-to-stand, lift, walker, cane, etc )  - [place] fall precautions as indicated by assessment  - Record patient progress and toleration of activity level on Mobility SBAR; progress patient to next Phase/Stage  - Instruct patient to call for assistance with activity based on assessment  - Consider rehabilitation consult to assist with strengthening/weightbearing, etc   Outcome: Progressing

## 2020-08-06 NOTE — PROGRESS NOTES
Progress Note - Gerson Jackson 1965, 63 y o  male MRN: [id number]    Unit/Bed#: -01 Encounter: [id number]    Primary Care Provider: Robles Vázquez DO   Date and time admitted to hospital: 8/4/2020  1:11 PM        Elevated troponin  Assessment & Plan  Most likely is demand secondary to AFib with RVR  No significant EKG change regarding ST-T elevation-patient does not complain any chest pain  Patient had previous cardiac catheterization in 2018, which was normal   Troponin: 0 15 X 3> 0 13    * Atrial fibrillation with RVR ([address] )  Assessment & Plan  Present on admission  Patient received digoxin 250 mcgm X 2, Lopressor 5 mg x 2, Cardizem 15 mg, then 20 mg IV  Patient was also placed on Cardizem drip which discontinued  Currently rate controlled  Continue metoprolol 75 mg p o  B i d -and titrate as long as heart rate tolerated  Lopressor IV as needed  Continue aspirin, Eliquis  As per chart review,Hx of Afib s/p cardioversion; Hx of LA thrombus   Patient was admitted to [address] [place] [address] in 1/2018 for this where he was found to be in Afib with RVR  He did have an echo that demonstrated LA thrombus so no cardioversion was performed initially  he was rate controlled and anticoagulated with eliquis  Subsequent KAYLYN on 3/2018 demonstrated no thormbus so he was successful cardioverted  [id number]: Moderate spontaneous echo contrast seen in LA appendage  Echogeneic structure seen in appendage concerning for MOLLY thrombus  Left ventricle is mildly dilated  Severely reduced left ventricular systolic function  Global     hypokinesis  Left ventricular ejection fraction is 20%  Severely dilated left atrium  Severely dilated right atrium  Enlarged right ventricular size  Reduced right ventricular systolic function        S/P KAYLYN Cardioversion (8/6/20) -a single synchronized biphasic shock at 150J, converted in sinus rhythm   Maintain potassium more than 4, magnesium more than 2  Repeat ECHO (8/5/20):     LEFT VENTRICLE:  The ventricle was mildly dilated  Systolic function was moderately reduced  Ejection fraction was estimated in the range of 25 % to 30 %  There was diffuse hypokinesis  Wall thickness was at the upper limits of normal   Left ventricular diastolic function not assessed due to atrial fibrillation      RIGHT VENTRICLE:  The ventricle was dilated  Systolic function was reduced  TAPSE 1 3      LEFT ATRIUM:  The atrium was moderately dilated      RIGHT ATRIUM:  The atrium was moderately dilated      MITRAL VALVE:  There was moderate regurgitation  The regurgitant jet was centrally directed      TRICUSPID VALVE:  There was mild to moderate regurgitation  Estimated peak PA pressure was 35 mmHg  Assuming a right atrial pressure of 15 mmHg      IVC, HEPATIC VEINS:  The inferior vena cava was dilated  Respirophasic changes in dimension were absent      COMPARISONS:  Compared to limited echocardiogram 8/22/2018, the EF is signicantly lower  Previous EF 62 5%  Acute on chronic systolic congestive heart failure (HCC)  Assessment & Plan  Wt Readings from Last 3 Encounters:   08/06/20 85 7 kg (189 lb)     Strict intake and output  Low-sodium diet  Continue , beta-blocker, aspirin  Lasix and lisinopril on hold due to elevated creatinine  Follow repeat echocardiogram,  Cardiology consult appreciated  Previous echocardiogram in 2018: Moderate spontaneous echo contrast seen in LA appendage  Echogeneic structure seen in appendage concerning for MOLLY thrombus  Left ventricle is mildly dilated  Severely reduced left ventricular systolic function  Global     hypokinesis  Left ventricular ejection fraction is 20%  Severely dilated left atrium  Severely dilated right atrium  Enlarged right ventricular size  Reduced right ventricular systolic function        Repeat ECHO (8/5/20):     LEFT VENTRICLE:  The ventricle was mildly dilated  Systolic function was moderately reduced   Ejection fraction was estimated in the range of 25 % to 30 %  There was diffuse hypokinesis  Wall thickness was at the upper limits of normal   Left ventricular diastolic function not assessed due to atrial fibrillation      RIGHT VENTRICLE:  The ventricle was dilated  Systolic function was reduced  TAPSE 1 3      LEFT ATRIUM:  The atrium was moderately dilated      RIGHT ATRIUM:  The atrium was moderately dilated      MITRAL VALVE:  There was moderate regurgitation  The regurgitant jet was centrally directed      TRICUSPID VALVE:  There was mild to moderate regurgitation  Estimated peak PA pressure was 35 mmHg  Assuming a right atrial pressure of 15 mmHg      IVC, HEPATIC VEINS:  The inferior vena cava was dilated  Respirophasic changes in dimension were absent      COMPARISONS:  Compared to limited echocardiogram 8/22/2018, the EF is signicantly lower  Previous EF 62 5%  Patient also had cardiac catheterization done in 2018, which was normal      Abnormal TSH  Assessment & Plan  TSH came back 0 147> 0 172   free T3, T4-is in normal range  Patient was on amiodarone previously    Cardiomyopathy [facility])  Assessment & Plan  Most likely secondary to tachyarrhythmia related  Previous echocardiogram shows: Moderate spontaneous echo contrast seen in LA appendage  Echogeneic structure seen in appendage concerning for MOLLY thrombus  Left ventricle is mildly dilated  Severely reduced left ventricular systolic function  Global     hypokinesis  Left ventricular ejection fraction is 20%  Severely dilated left atrium  Severely dilated right atrium  Enlarged right ventricular size  Reduced right ventricular systolic function      Cardiology consult appreciated  Continue beta-blocker, aspirin, statin,   lisinopril-discontinued due to elevated creatinine  ECHO (8/5/20):     LEFT VENTRICLE:  The ventricle was mildly dilated  Systolic function was moderately reduced  Ejection fraction was estimated in the range of 25 % to 30 %    There was diffuse hypokinesis  Wall thickness was at the upper limits of normal   Left ventricular diastolic function not assessed due to atrial fibrillation      RIGHT VENTRICLE:  The ventricle was dilated  Systolic function was reduced  TAPSE 1 3      LEFT ATRIUM:  The atrium was moderately dilated      RIGHT ATRIUM:  The atrium was moderately dilated      MITRAL VALVE:  There was moderate regurgitation  The regurgitant jet was centrally directed      TRICUSPID VALVE:  There was mild to moderate regurgitation  Estimated peak PA pressure was 35 mmHg  Assuming a right atrial pressure of 15 mmHg      IVC, HEPATIC VEINS:  The inferior vena cava was dilated  Respirophasic changes in dimension were absent      COMPARISONS:  Compared to limited echocardiogram 2018, the EF is signicantly lower  Previous EF 62 5%  VTE Pharmacologic Prophylaxis:   Pharmacologic: Apixaban (Eliquis)  Mechanical VTE Prophylaxis in Place: Yes    Patient Centered Rounds: I have performed bedside rounds with nursing staff today  Discussions with Specialists or Other Care Team Provider:  Cardiology    Education and Discussions with Family / Patient:  Yes with patient    Time Spent for Care: 30 minutes  More than 50% of total time spent on counseling and coordination of care as described above  Current Length of Stay: 2 day(s)    Current Patient Status: Inpatient   Certification Statement: The patient will continue to require additional inpatient hospital stay due to Atrial fibrillation with RVR status post cardioversion    Discharge Plan / Estimated Discharge Date: To be determined      Code Status: Level 1 - Full Code      Subjective:   Seen and evaluated during the round  Patient denies any significant complaint      Objective:     Vitals:   Temp (24hrs), Av 6 °F (36 4 °C), Min:97 3 °F (36 3 °C), Max:97 8 °F (36 6 °C)    Temp:  [97 3 °F (36 3 °C)-97 8 °F (36 6 °C)] 97 4 °F (36 3 °C)  HR:  [] 76  Resp:  [12-22] 19  BP: ()/(58-76) 94/59  SpO2:  [91 %-98 %] 98 %  Body mass index is 24 94 kg/m²  Input and Output Summary (last 24 hours): Intake/Output Summary (Last 24 hours) at 8/6/2020 1521  Last data filed at 8/6/2020 1505  Gross per 24 hour   Intake 300 ml   Output 2475 ml   Net -2175 ml       Physical Exam:     Physical Exam   Constitutional: He is oriented to person, place, and time  Eyes: Pupils are equal, round, and reactive to light  Conjunctivae are normal  No scleral icterus  Neck: Normal range of motion  Cardiovascular: Normal rate  Exam reveals no gallop and no friction rub  No murmur heard  Pulmonary/Chest: Effort normal  No respiratory distress  He has no wheezes  He has no rales  Abdominal: Bowel sounds are normal  He exhibits no distension  There is no abdominal tenderness  There is no guarding  Musculoskeletal: Normal range of motion  Neurological: He is alert and oriented to person, place, and time  Skin: Skin is warm  Capillary refill takes less than 2 seconds  He is not diaphoretic  Psychiatric: Mood normal    Nursing note and vitals reviewed  Additional Data:     Labs:    Results from last 7 days   Lab Units 08/06/20  1000   WBC Thousand/uL 14 27*   HEMOGLOBIN g/dL 17 6*   HEMATOCRIT % 53 5*   PLATELETS Thousands/uL 440*   NEUTROS PCT % 63   LYMPHS PCT % 20   MONOS PCT % 9   EOS PCT % 7*     Results from last 7 days   Lab Units 08/06/20  1001 08/05/20  0518   POTASSIUM mmol/L 4 5 3 6   CHLORIDE mmol/L 99* 104   CO2 mmol/L 26 26   BUN mg/dL 23 15   CREATININE mg/dL 1 58* 1 28   CALCIUM mg/dL 9 1 8 4   ALK PHOS U/L  --  150*   ALT U/L  --  34   AST U/L  --  21     Results from last 7 days   Lab Units 08/04/20  1323   INR  1 12       * I Have Reviewed All Lab Data Listed Above  * Additional Pertinent Lab Tests Reviewed:  All Labs Within Last 24 Hours Reviewed      Last 24 Hours Medication List:   acetaminophen, 650 mg, Oral, Q6H PRN, Hadley Isabel MD  aluminum-magnesium hydroxide-simethicone, 30 mL, Oral, Q6H PRN, Dmitriy Mays MD  amiodarone, 200 mg, Oral, TID With Meals, Mary Garcia DO  apixaban, 5 mg, Oral, BID, Dmitriy Mays MD  atorvastatin, 40 mg, Oral, Daily With Juan Alberto Alonso MD  calcium carbonate, 1,000 mg, Oral, Daily PRN, Dmitriy Mays MD  docusate sodium, 100 mg, Oral, BID, Dmitriy Mays MD  magnesium oxide, 400 mg, Oral, BID, Dmitriy Mays MD  metoprolol, 5 mg, Intravenous, Q6H PRN, Elmer Torres MD  metoprolol succinate, 50 mg, Oral, BID, Mary Garcia DO  ondansetron, 4 mg, Intravenous, Q6H PRN, Elmer Torres MD  rOPINIRole, 0 5 mg, Oral, HS, Sandra Shaw MD  sodium chloride, 500 mL, Intravenous, Once, Mary Garcia DO, Last Rate: 500 mL (08/06/20 1344)         Today, Patient Was Seen By: Elmer Torres MD    ** Please Note: Dragon 360 Dictation voice to text software may have been used in the creation of this document   **

## 2020-08-06 NOTE — ASSESSMENT & PLAN NOTE
Wt Readings from Last 3 Encounters:   08/06/20 85 7 kg (189 lb)     Strict intake and output  Low-sodium diet  Continue , beta-blocker, aspirin  Lasix and lisinopril on hold due to elevated creatinine  Follow repeat echocardiogram,  Cardiology consult appreciated  Previous echocardiogram in 2018: Moderate spontaneous echo contrast seen in LA appendage  Echogeneic structure seen in appendage concerning for MOLLY thrombus  Left ventricle is mildly dilated  Severely reduced left ventricular systolic function  Global     hypokinesis  Left ventricular ejection fraction is 20%  Severely dilated left atrium  Severely dilated right atrium  Enlarged right ventricular size  Reduced right ventricular systolic function        Repeat ECHO (8/5/20):     LEFT VENTRICLE:  The ventricle was mildly dilated  Systolic function was moderately reduced  Ejection fraction was estimated in the range of 25 % to 30 %  There was diffuse hypokinesis  Wall thickness was at the upper limits of normal   Left ventricular diastolic function not assessed due to atrial fibrillation      RIGHT VENTRICLE:  The ventricle was dilated  Systolic function was reduced  TAPSE 1 3      LEFT ATRIUM:  The atrium was moderately dilated      RIGHT ATRIUM:  The atrium was moderately dilated      MITRAL VALVE:  There was moderate regurgitation  The regurgitant jet was centrally directed      TRICUSPID VALVE:  There was mild to moderate regurgitation  Estimated peak PA pressure was 35 mmHg  Assuming a right atrial pressure of 15 mmHg      IVC, HEPATIC VEINS:  The inferior vena cava was dilated  Respirophasic changes in dimension were absent      COMPARISONS:  Compared to limited echocardiogram 8/22/2018, the EF is signicantly lower  Previous EF 62 5%      Patient also had cardiac catheterization done in 2018, which was normal

## 2020-08-07 ENCOUNTER — APPOINTMENT (INPATIENT)
Dept: ULTRASOUND IMAGING | Facility: HOSPITAL | Age: 55
DRG: 314 | End: 2020-08-07

## 2020-08-07 PROBLEM — N17.9 AKI (ACUTE KIDNEY INJURY) (HCC): Status: ACTIVE | Noted: 2020-08-07

## 2020-08-07 LAB
ALBUMIN SERPL BCP-MCNC: 3.1 G/DL (ref 3.5–5)
ALP SERPL-CCNC: 171 U/L (ref 46–116)
ALT SERPL W P-5'-P-CCNC: 31 U/L (ref 12–78)
ANION GAP SERPL CALCULATED.3IONS-SCNC: 6 MMOL/L (ref 4–13)
AST SERPL W P-5'-P-CCNC: 25 U/L (ref 5–45)
ATRIAL RATE: 70 BPM
BASOPHILS # BLD AUTO: 0.1 THOUSANDS/ΜL (ref 0–0.1)
BASOPHILS NFR BLD AUTO: 1 % (ref 0–1)
BILIRUB SERPL-MCNC: 0.37 MG/DL (ref 0.2–1)
BUN SERPL-MCNC: 20 MG/DL (ref 5–25)
CALCIUM SERPL-MCNC: 8.8 MG/DL (ref 8.3–10.1)
CHLORIDE SERPL-SCNC: 102 MMOL/L (ref 100–108)
CO2 SERPL-SCNC: 29 MMOL/L (ref 21–32)
CREAT SERPL-MCNC: 1.41 MG/DL (ref 0.6–1.3)
EOSINOPHIL # BLD AUTO: 1.06 THOUSAND/ΜL (ref 0–0.61)
EOSINOPHIL NFR BLD AUTO: 9 % (ref 0–6)
ERYTHROCYTE [DISTWIDTH] IN BLOOD BY AUTOMATED COUNT: 12.1 % (ref 11.6–15.1)
GFR SERPL CREATININE-BSD FRML MDRD: 55 ML/MIN/1.73SQ M
GLUCOSE SERPL-MCNC: 110 MG/DL (ref 65–140)
HCT VFR BLD AUTO: 51.7 % (ref 36.5–49.3)
HGB BLD-MCNC: 16.7 G/DL (ref 12–17)
IMM GRANULOCYTES # BLD AUTO: 0.04 THOUSAND/UL (ref 0–0.2)
IMM GRANULOCYTES NFR BLD AUTO: 0 % (ref 0–2)
LYMPHOCYTES # BLD AUTO: 3.15 THOUSANDS/ΜL (ref 0.6–4.47)
LYMPHOCYTES NFR BLD AUTO: 26 % (ref 14–44)
MCH RBC QN AUTO: 28.1 PG (ref 26.8–34.3)
MCHC RBC AUTO-ENTMCNC: 32.3 G/DL (ref 31.4–37.4)
MCV RBC AUTO: 87 FL (ref 82–98)
MONOCYTES # BLD AUTO: 1.26 THOUSAND/ΜL (ref 0.17–1.22)
MONOCYTES NFR BLD AUTO: 11 % (ref 4–12)
NEUTROPHILS # BLD AUTO: 6.31 THOUSANDS/ΜL (ref 1.85–7.62)
NEUTS SEG NFR BLD AUTO: 53 % (ref 43–75)
NRBC BLD AUTO-RTO: 0 /100 WBCS
P AXIS: 33 DEGREES
PLATELET # BLD AUTO: 378 THOUSANDS/UL (ref 149–390)
PMV BLD AUTO: 10.2 FL (ref 8.9–12.7)
POTASSIUM SERPL-SCNC: 4.3 MMOL/L (ref 3.5–5.3)
PR INTERVAL: 164 MS
PROT SERPL-MCNC: 7.1 G/DL (ref 6.4–8.2)
QRS AXIS: 36 DEGREES
QRS AXIS: 59 DEGREES
QRS AXIS: 60 DEGREES
QRSD INTERVAL: 86 MS
QRSD INTERVAL: 88 MS
QRSD INTERVAL: 90 MS
QT INTERVAL: 366 MS
QT INTERVAL: 366 MS
QT INTERVAL: 434 MS
QTC INTERVAL: 468 MS
QTC INTERVAL: 486 MS
QTC INTERVAL: 499 MS
RBC # BLD AUTO: 5.94 MILLION/UL (ref 3.88–5.62)
SODIUM SERPL-SCNC: 137 MMOL/L (ref 136–145)
T WAVE AXIS: 100 DEGREES
T WAVE AXIS: 84 DEGREES
T WAVE AXIS: 91 DEGREES
VENTRICULAR RATE: 106 BPM
VENTRICULAR RATE: 112 BPM
VENTRICULAR RATE: 70 BPM
WBC # BLD AUTO: 11.92 THOUSAND/UL (ref 4.31–10.16)

## 2020-08-07 PROCEDURE — 93010 ELECTROCARDIOGRAM REPORT: CPT | Performed by: INTERNAL MEDICINE

## 2020-08-07 PROCEDURE — 84445 ASSAY OF TSI GLOBULIN: CPT | Performed by: FAMILY MEDICINE

## 2020-08-07 PROCEDURE — 99232 SBSQ HOSP IP/OBS MODERATE 35: CPT | Performed by: INTERNAL MEDICINE

## 2020-08-07 PROCEDURE — 86376 MICROSOMAL ANTIBODY EACH: CPT | Performed by: FAMILY MEDICINE

## 2020-08-07 PROCEDURE — 99232 SBSQ HOSP IP/OBS MODERATE 35: CPT | Performed by: FAMILY MEDICINE

## 2020-08-07 PROCEDURE — 83520 IMMUNOASSAY QUANT NOS NONAB: CPT | Performed by: FAMILY MEDICINE

## 2020-08-07 PROCEDURE — 85025 COMPLETE CBC W/AUTO DIFF WBC: CPT | Performed by: FAMILY MEDICINE

## 2020-08-07 PROCEDURE — 80053 COMPREHEN METABOLIC PANEL: CPT | Performed by: FAMILY MEDICINE

## 2020-08-07 PROCEDURE — 76536 US EXAM OF HEAD AND NECK: CPT

## 2020-08-07 RX ORDER — ROPINIROLE 0.5 MG/1
0.5 TABLET, FILM COATED ORAL
Qty: 30 TABLET | Refills: 0 | Status: SHIPPED | OUTPATIENT
Start: 2020-08-07 | End: 2020-08-08

## 2020-08-07 RX ORDER — METOPROLOL SUCCINATE 50 MG/1
50 TABLET, EXTENDED RELEASE ORAL 2 TIMES DAILY
Qty: 60 TABLET | Refills: 1 | Status: SHIPPED | OUTPATIENT
Start: 2020-08-07 | End: 2020-08-08

## 2020-08-07 RX ORDER — METOPROLOL SUCCINATE 50 MG/1
50 TABLET, EXTENDED RELEASE ORAL 2 TIMES DAILY
Status: DISCONTINUED | OUTPATIENT
Start: 2020-08-07 | End: 2020-08-08 | Stop reason: HOSPADM

## 2020-08-07 RX ORDER — AMIODARONE HYDROCHLORIDE 200 MG/1
200 TABLET ORAL
Qty: 90 TABLET | Refills: 0 | Status: SHIPPED | OUTPATIENT
Start: 2020-08-07 | End: 2020-08-08

## 2020-08-07 RX ORDER — ATORVASTATIN CALCIUM 40 MG/1
40 TABLET, FILM COATED ORAL
Qty: 30 TABLET | Refills: 0 | Status: SHIPPED | OUTPATIENT
Start: 2020-08-07 | End: 2020-08-08

## 2020-08-07 RX ADMIN — SODIUM CHLORIDE 500 ML: 0.9 INJECTION, SOLUTION INTRAVENOUS at 10:34

## 2020-08-07 RX ADMIN — METOPROLOL SUCCINATE 50 MG: 50 TABLET, EXTENDED RELEASE ORAL at 08:12

## 2020-08-07 RX ADMIN — Medication 400 MG: at 17:53

## 2020-08-07 RX ADMIN — Medication 400 MG: at 08:12

## 2020-08-07 RX ADMIN — APIXABAN 5 MG: 5 TABLET, FILM COATED ORAL at 17:53

## 2020-08-07 RX ADMIN — AMIODARONE HYDROCHLORIDE 200 MG: 200 TABLET ORAL at 08:12

## 2020-08-07 RX ADMIN — APIXABAN 5 MG: 5 TABLET, FILM COATED ORAL at 08:12

## 2020-08-07 RX ADMIN — AMIODARONE HYDROCHLORIDE 200 MG: 200 TABLET ORAL at 16:08

## 2020-08-07 RX ADMIN — AMIODARONE HYDROCHLORIDE 200 MG: 200 TABLET ORAL at 11:20

## 2020-08-07 RX ADMIN — ROPINIROLE HYDROCHLORIDE 0.5 MG: 0.25 TABLET, FILM COATED ORAL at 21:10

## 2020-08-07 RX ADMIN — METOPROLOL SUCCINATE 50 MG: 50 TABLET, EXTENDED RELEASE ORAL at 17:53

## 2020-08-07 RX ADMIN — ATORVASTATIN CALCIUM 40 MG: 40 TABLET, FILM COATED ORAL at 16:08

## 2020-08-07 NOTE — SOCIAL WORK
As per attending, pt will be discharged on the following Rx  Attending asking CM to obtain pricing for each med, as pt does not have insurance  Requip - $9 00 with [facility] card  Toprol XL - $4 00 at [facility]  Amiodarone -$9 00 at [facility]  Lipitor -$9 00 at [facility]    CM discussed cost of medications with pt, pt sts he can afford the above  CM faxed prescriptions to [facility], [place], pt verbalizes understanding

## 2020-08-07 NOTE — PLAN OF CARE
Problem: Potential for Falls  Goal: Patient will remain free of falls  Description: INTERVENTIONS:  - Assess patient frequently for physical needs  -  Identify cognitive and physical deficits and behaviors that affect risk of falls    -  [place] fall precautions as indicated by assessment   - Educate patient/family on patient safety including physical limitations  - Instruct patient to call for assistance with activity based on assessment  - Modify environment to reduce risk of injury  - Consider OT/PT consult to assist with strengthening/mobility  Outcome: Progressing     Problem: CARDIOVASCULAR - ADULT  Goal: Maintains optimal cardiac output and hemodynamic stability  Description: INTERVENTIONS:  - Monitor I/O, vital signs and rhythm  - Monitor for S/S and trends of decreased cardiac output  - Administer and titrate ordered vasoactive medications to optimize hemodynamic stability  - Assess quality of pulses, skin color and temperature  - Assess for signs of decreased coronary artery perfusion  - Instruct patient to report change in severity of symptoms  Outcome: Progressing  Goal: Absence of cardiac dysrhythmias or at baseline rhythm  Description: INTERVENTIONS:  - Continuous cardiac monitoring, vital signs, obtain 12 lead EKG if ordered  - Administer antiarrhythmic and heart rate control medications as ordered  - Monitor electrolytes and administer replacement therapy as ordered  Outcome: Progressing     Problem: PAIN - ADULT  Goal: Verbalizes/displays adequate comfort level or baseline comfort level  Description: Interventions:  - Encourage patient to monitor pain and request assistance  - Assess pain using appropriate pain scale  - Administer analgesics based on type and severity of pain and evaluate response  - Implement non-pharmacological measures as appropriate and evaluate response  - Consider cultural and social influences on pain and pain management  - Notify physician/advanced practitioner if interventions unsuccessful or patient reports new pain  Outcome: Progressing     Problem: INFECTION - ADULT  Goal: Absence or prevention of progression during hospitalization  Description: INTERVENTIONS:  - Assess and monitor for signs and symptoms of infection  - Monitor lab/diagnostic results  - Monitor all insertion sites, i e  indwelling lines, tubes, and drains  - Monitor endotracheal if appropriate and nasal secretions for changes in amount and color  - [place] appropriate cooling/warming therapies per order  - Administer medications as ordered  - Instruct and encourage patient and family to use good hand hygiene technique  - Identify and instruct in appropriate isolation precautions for identified infection/condition  Outcome: Progressing    Problem: SAFETY ADULT  Goal: Patient will remain free of falls  Description: INTERVENTIONS:  - Assess patient frequently for physical needs  -  Identify cognitive and physical deficits and behaviors that affect risk of falls    -  [place] fall precautions as indicated by assessment   - Educate patient/family on patient safety including physical limitations  - Instruct patient to call for assistance with activity based on assessment  - Modify environment to reduce risk of injury  - Consider OT/PT consult to assist with strengthening/mobility  Outcome: Progressing  Goal: Maintain or return to baseline ADL function  Description: INTERVENTIONS:  -  Assess patient's ability to carry out ADLs; assess patient's baseline for ADL function and identify physical deficits which impact ability to perform ADLs (bathing, care of mouth/teeth, toileting, grooming, dressing, etc )  - Assess/evaluate cause of self-care deficits   - Assess range of motion  - Assess patient's mobility; develop plan if impaired  - Assess patient's need for assistive devices and provide as appropriate  - Encourage maximum independence but intervene and supervise when necessary  - Involve family in performance of ADLs  - Assess for home care needs following discharge   - Consider OT consult to assist with ADL evaluation and planning for discharge  - Provide patient education as appropriate  Outcome: Progressing  Goal: Maintain or return mobility status to optimal level  Description: INTERVENTIONS:  - Assess patient's baseline mobility status (ambulation, transfers, stairs, etc )    - Identify cognitive and physical deficits and behaviors that affect mobility  - Identify mobility aids required to assist with transfers and/or ambulation (gait belt, sit-to-stand, lift, walker, cane, etc )  - [place] fall precautions as indicated by assessment  - Record patient progress and toleration of activity level on Mobility SBAR; progress patient to next Phase/Stage  - Instruct patient to call for assistance with activity based on assessment  - Consider rehabilitation consult to assist with strengthening/weightbearing, etc   Outcome: Progressing     Problem: DISCHARGE PLANNING  Goal: Discharge to home or other facility with appropriate resources  Description: INTERVENTIONS:  - Identify barriers to discharge w/patient and caregiver  - Arrange for needed discharge resources and transportation as appropriate  - Identify discharge learning needs (meds, wound care, etc )  - Arrange for interpretive services to assist at discharge as needed  - Refer to Case Management Department for coordinating discharge planning if the patient needs post-hospital services based on physician/advanced practitioner order or complex needs related to functional status, cognitive ability, or social support system  Outcome: Progressing     Problem: Knowledge Deficit  Goal: Patient/family/caregiver demonstrates understanding of disease process, treatment plan, medications, and discharge instructions  Description: Complete learning assessment and assess knowledge base    Interventions:  - Provide teaching at level of understanding  - Provide teaching via preferred learning methods  Outcome: Progressing

## 2020-08-07 NOTE — ASSESSMENT & PLAN NOTE
Lisinopril on hold  Patient received 500 cc bolus yesterday, will give another 500 cc bolus and increase p o  Hydration  Recheck BMP in a m

## 2020-08-07 NOTE — ASSESSMENT & PLAN NOTE
Suspect CASEY due to over diuresis as the patients labs look hemoconcentrated  I did give him a 500 cc bolus yesterday and would repeat today and encourage oral intake  Hold ACE-I for now  Hold off on the addition of Aldactone/spironolactone for now    Repeat BMP in am

## 2020-08-07 NOTE — ASSESSMENT & PLAN NOTE
TSH low  May be related to amiodarone use as he was on amiodarone for about 1 1/2 years  Ideally would like to continue amiodarone 3-6 months now post cardioversion  Primary team is reaching out to endocrinology for recommendations

## 2020-08-07 NOTE — PROGRESS NOTES
Progress Note:Cardiology  Jigna Favorite 1965, 63 y o  male MRN: [id number]    Unit/Bed#: -01 Encounter: [id number]  Attending Physician: Jeaneth Najera MD   Primary Care Provider: Macy Rinaldi DO   Date admitted to hospital: 8/4/2020  Length of stay: 3         CASEY (acute kidney injury) [facility])  Assessment & Plan  Suspect CASEY due to over diuresis as the patients labs look hemoconcentrated  I did give him a 500 cc bolus yesterday and would repeat today and encourage oral intake  Hold ACE-I for now  Hold off on the addition of Aldactone/spironolactone for now  Repeat BMP in am      * Atrial fibrillation with RVR (Former[facility])  Assessment & Plan  Atrial fibrillation with rapid ventricular response noted on admission  Prior history of atrial fibrillation dating back to 2018 at which time he had a left atrial appendage thrombus and was initially unable to undergo cardioversion however he was subsequently cardioverted 03/2018 and started on amiodarone for rhythm control  Patient had been on amiodarone 200 mg daily as well as metoprolol succinate 50 mg twice a day and Eliquis anticoagulation but lasts insurance several months ago and has not been on any medications for at least the last 6 months and perhaps longer  CHADSVASc= 1 (LV dysfunction)  Continue Eliquis anticoagulation  Continue metoprolol succinate 50 mg twice daily  KAYLYN guided cardioversion 8/6/2020 was successful in restoring normal sinus rhythm  Short run of SVT/AF this am    Beta blockers were held yesterday due to not meeting blood pressure parameters  Amiodarone 3-6 months only post cardioversion with close watch of TSH  Can reduce amiodarone back to 200 mg daily for discharge  Discussed the need for ETOH cessation  Would have low threshold for ablation given recurrent heart failure in the setting of atrial fibrillation  Eventual sleep study         CHF (congestive heart failure) (Formerly [facility])  Assessment & Plan  Wt Readings from Last 3 Encounters:   08/06/20 85 7 kg (189 lb)             Abnormal TSH  Assessment & Plan  TSH low  May be related to amiodarone use as he was on amiodarone for about 1 1/2 years  Ideally would like to continue amiodarone 3-6 months now post cardioversion  Primary team is reaching out to endocrinology for recommendations  Cardiomyopathy [facility])  Assessment & Plan  Likely tachycardia induced cardiomyopathy  Echocardiogram 08/05/2020 EF 25-30% but difficult to assess with underlying atrial fibrillation and rapid rates  Would continue GDMT with long acting beta blocker, resume lisinopril if/when renal function improves, add spironolactone if/when renal function improves  Will need repeat echocardiogram in 3 months to see if LVEF improves and if it does not, he will need ICD  No LifeVest at this time as likely his EF will recover rapidly if he maintains normal sinus rhythm  Acute on chronic systolic congestive heart failure (HCC)  Assessment & Plan  Wt Readings from Last 3 Encounters:   08/05/20 91 1 kg (200 lb 13 4 oz)     Patient with prior history ejection fraction of 20% noted in 2018 at the time of his diagnosis of atrial fibrillation  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 cardiomyopathy  Cardiac catheterization 02/2018 showed no evidence of coronary artery disease  Patient did have recovery of EF with an echocardiogram 08/2018 showing ejection fraction of 62%  Also question if there could be some component of EtOH induced cardiomyopathy  Would recommend continuing  metoprolol succinate 50 mg twice daily for discharge  Low-dose ACE-I/ARB was initiated  Up titrate if renal function remains stable and blood pressure will allow (was previously lisinopril 10 mg daily)  Would hold lisinopril for now given CASEY  Daily standing weights  Accurate I/O's  Optimize electrolytes with K > 4 and Mg > 2     Patient does not appear grossly volume overloaded will hold furosemide given worsening renal function  Elevated troponin  Assessment & Plan  Elevated troponin (flat trend) secondary to demand in the setting of rapid atrial fibrillation  Troponin 0 15-0 15-0  13  No evidence of ACS  Normal coronary arteries on cardiac catheterization 02/2018  Can consider outpatient ischemic work up if deemed appropriate  Subjective:   Patient seen and examined  No significant events overnight  Patient feels well  Decreased shortness of breath  No chest pain  He would like to shower if possible  Short run of SVT on telemetry this am with HR into the 170's  Discussed elevated creatinine  We also discussed that I think he should be monitored for an additional 24 hours on telemetry as well  He is agreeable to stay overnight  Nursing tells me that his beta-blocker was held due to him not meeting BP parameters  Review of Systems   Constitution: Positive for malaise/fatigue  Negative for diaphoresis and weight gain  HENT: Negative for hearing loss and tinnitus  Cardiovascular: Positive for dyspnea on exertion  Negative for chest pain, claudication, leg swelling, orthopnea and palpitations  Respiratory: Positive for shortness of breath  Negative for cough and snoring  Gastrointestinal: Negative for abdominal pain, nausea and vomiting  Neurological: Negative for dizziness and light-headedness  All other systems reviewed and are negative  Objective:     Vitals: Blood pressure 121/76, pulse 89, temperature 97 6 °F (36 4 °C), resp  rate 16, height 6' 1" (1 854 m), weight 85 7 kg (189 lb), SpO2 96 %  , Body mass index is 24 94 kg/m² ,     Orthostatic Blood Pressures      Most Recent Value   Blood Pressure  121/76 filed at 08/07/2020 0740   Patient Position - Orthostatic VS  Lying filed at 08/06/2020 2203          Physical Exam  Vitals signs reviewed  Constitutional:       Appearance: He is well-developed  HENT:      Head: Normocephalic and atraumatic  Neck:      Vascular: No JVD  Cardiovascular:      Rate and Rhythm: Normal rate and regular rhythm  Heart sounds: No murmur  Pulmonary:      Effort: Pulmonary effort is normal       Breath sounds: Normal breath sounds  No rales  Skin:     General: Skin is warm and dry  Neurological:      Mental Status: He is alert and oriented to person, place, and time              Intake/Output Summary (Last 24 hours) at 8/7/2020 1045  Last data filed at 8/7/2020 0801  Gross per 24 hour   Intake 540 ml   Output 800 ml   Net -260 ml       Weight (last 2 days)     Date/Time   Weight    08/06/20 1121   85 7 (189)    08/06/20 0600   90 5 (199 6)    08/05/20 0521   91 1 (200 84)                 Medications:      Current Facility-Administered Medications:     acetaminophen (TYLENOL) tablet 650 mg, 650 mg, Oral, Q6H PRN, Gilma Potter MD    aluminum-magnesium hydroxide-simethicone (MYLANTA) 200-200-20 mg/5 mL oral suspension 30 mL, 30 mL, Oral, Q6H PRN, Gilma Potter MD    amiodarone tablet 200 mg, 200 mg, Oral, TID With Meals, Mary Echeverriamijose, DO, 200 mg at 08/07/20 0533    apixaban (ELIQUIS) tablet 5 mg, 5 mg, Oral, BID, Sherman Mays MD, 5 mg at 08/07/20 6273    atorvastatin (LIPITOR) tablet 40 mg, 40 mg, Oral, Daily With Jair Drummond MD, 40 mg at 08/06/20 1722    calcium carbonate (TUMS) chewable tablet 1,000 mg, 1,000 mg, Oral, Daily PRN, Gilma Potter MD    docusate sodium (COLACE) capsule 100 mg, 100 mg, Oral, BID, Sherman Mays MD    magnesium oxide (MAG-OX) tablet 400 mg, 400 mg, Oral, BID, Sherman Mays MD, 400 mg at 08/07/20 1207    metoprolol (LOPRESSOR) injection 5 mg, 5 mg, Intravenous, Q6H PRN, Sherman Mays MD, 5 mg at 08/05/20 2143    metoprolol succinate (TOPROL-XL) 24 hr tablet 50 mg, 50 mg, Oral, BID, Mary Echeverriamik, DO, 50 mg at 08/07/20 0812    ondansetron (ZOFRAN) injection 4 mg, 4 mg, Intravenous, Q6H PRN, Sherman aMys MD    rOPINIRole (REQUIP) tablet 0 5 mg, 0 5 mg, Oral, HS, Reginald Troncoso MD, 0 5 mg at 08/06/20 2119    sodium chloride 0 9 % bolus 500 mL, 500 mL, Intravenous, Once, Lewis Bird MD, Last Rate: 250 mL/hr at 08/07/20 1034, 500 mL at 08/07/20 1034     Labs & Results:    Results from last 7 days   Lab Units 08/04/20  2330 08/04/20  2007 08/04/20  1701   TROPONIN I ng/mL 0 13* 0 15* 0 15*     Results from last 7 days   Lab Units 08/07/20  0551 08/06/20  1000 08/05/20  0518   WBC Thousand/uL 11 92* 14 27* 11 10*   HEMOGLOBIN g/dL 16 7 17 6* 14 2   HEMATOCRIT % 51 7* 53 5* 44 1   PLATELETS Thousands/uL 378 440* 313     Results from last 7 days   Lab Units 08/04/20  1323   TRIGLYCERIDES mg/dL 111   HDL mg/dL 40     Results from last 7 days   Lab Units 08/07/20  0551 08/06/20  1811 08/06/20  1001 08/05/20  0518 08/04/20  1323   POTASSIUM mmol/L 4 3 4 5 4 5 3 6 4 2   CHLORIDE mmol/L 102 98* 99* 104 105   CO2 mmol/L 29 34* 26 26 28   BUN mg/dL 20 23 23 15 18   CREATININE mg/dL 1 41* 1 65* 1 58* 1 28 1 29   CALCIUM mg/dL 8 8 9 3 9 1 8 4 8 6   ALK PHOS U/L 171*  --   --  150* 175*   ALT U/L 31  --   --  34 40   AST U/L 25  --   --  21 27     Results from last 7 days   Lab Units 08/04/20  1323   INR  1 12   PTT seconds 27     Results from last 7 days   Lab Units 08/06/20  1001 08/05/20  0518   MAGNESIUM mg/dL 2 4 1 9     Results from last 7 days   Lab Units 08/06/20  1001 08/04/20  1323   NT-PRO BNP pg/mL 2,009* 2,786*              Counseling / Coordination of Care  Total floor / unit time spent today 30 minutes  Greater than 50% of total time was spent with the patient and / or family counseling and / or coordination of care    A description of the counseling / coordination of care: Discussed plan in detail with patient as well as with Dr Teddy Smalls

## 2020-08-07 NOTE — ASSESSMENT & PLAN NOTE
TSH came back 0 147> 0 172   free T3, T4-is in normal range  Patient was on amiodarone previously  Will try to reach Endocrinology to discuss this issue

## 2020-08-07 NOTE — PROGRESS NOTES
Progress Note - Melissa Valencia 1965, 63 y o  male MRN: [id number]    Unit/Bed#: -01 Encounter: [id number]    Primary Care Provider: Mik Banks DO   Date and time admitted to hospital: 8/4/2020  1:11 PM        Elevated troponin  Assessment & Plan  Most likely is demand secondary to AFib with RVR  No significant EKG change regarding ST-T elevation-patient does not complain any chest pain  Patient had previous cardiac catheterization in 2018, which was normal   Troponin: 0 15 X 3> 0 13    * Atrial fibrillation with RVR ([address] )  Assessment & Plan  Present on admission  Patient received digoxin 250 mcgm X 2, Lopressor 5 mg x 2, Cardizem 15 mg, then 20 mg IV  Patient was also placed on Cardizem drip which discontinued  Currently rate controlled  Continue metoprolol 75 mg p o  B i d -and titrate as long as heart rate tolerated  Lopressor IV as needed  Continue aspirin, Eliquis  As per chart review,Hx of Afib s/p cardioversion; Hx of LA thrombus   Patient was admitted to [address] [place] [address] in 1/2018 for this where he was found to be in Afib with RVR  He did have an echo that demonstrated LA thrombus so no cardioversion was performed initially  he was rate controlled and anticoagulated with eliquis  Subsequent KAYLYN on 3/2018 demonstrated no thormbus so he was successful cardioverted  On telemetry, patient was running atrial flutter this morning-patient did not receive his beta-blocker due to holding parameter  Continue beta-blocker and monitor  If patient remained stable, plan is to discharge in a   [id number]: Moderate spontaneous echo contrast seen in LA appendage  Echogeneic structure seen in appendage concerning for MOLLY thrombus  Left ventricle is mildly dilated  Severely reduced left ventricular systolic function  Global     hypokinesis  Left ventricular ejection fraction is 20%  Severely dilated left atrium  Severely dilated right atrium  Enlarged right ventricular size   Reduced right ventricular systolic function        S/P KAYLYN Cardioversion (8/6/20) -a single synchronized biphasic shock at 150J, converted in sinus rhythm   Maintain potassium more than 4, magnesium more than 2  Repeat ECHO (8/5/20):     LEFT VENTRICLE:  The ventricle was mildly dilated  Systolic function was moderately reduced  Ejection fraction was estimated in the range of 25 % to 30 %  There was diffuse hypokinesis  Wall thickness was at the upper limits of normal   Left ventricular diastolic function not assessed due to atrial fibrillation      RIGHT VENTRICLE:  The ventricle was dilated  Systolic function was reduced  TAPSE 1 3      LEFT ATRIUM:  The atrium was moderately dilated      RIGHT ATRIUM:  The atrium was moderately dilated      MITRAL VALVE:  There was moderate regurgitation  The regurgitant jet was centrally directed      TRICUSPID VALVE:  There was mild to moderate regurgitation  Estimated peak PA pressure was 35 mmHg  Assuming a right atrial pressure of 15 mmHg      IVC, HEPATIC VEINS:  The inferior vena cava was dilated  Respirophasic changes in dimension were absent      COMPARISONS:  Compared to limited echocardiogram 8/22/2018, the EF is signicantly lower  Previous EF 62 5%  Acute on chronic systolic congestive heart failure (HCC)  Assessment & Plan  Wt Readings from Last 3 Encounters:   08/06/20 85 7 kg (189 lb)     Strict intake and output  Low-sodium diet  Continue , beta-blocker, aspirin  Lasix and lisinopril on hold due to elevated creatinine  Follow repeat echocardiogram,  Cardiology consult appreciated  Previous echocardiogram in 2018: Moderate spontaneous echo contrast seen in LA appendage  Echogeneic structure seen in appendage concerning for MOLLY thrombus  Left ventricle is mildly dilated  Severely reduced left ventricular systolic function  Global     hypokinesis  Left ventricular ejection fraction is 20%  Severely dilated left atrium  Severely dilated right atrium      Enlarged right ventricular size  Reduced right ventricular systolic function        Repeat ECHO (8/5/20):     LEFT VENTRICLE:  The ventricle was mildly dilated  Systolic function was moderately reduced  Ejection fraction was estimated in the range of 25 % to 30 %  There was diffuse hypokinesis  Wall thickness was at the upper limits of normal   Left ventricular diastolic function not assessed due to atrial fibrillation      RIGHT VENTRICLE:  The ventricle was dilated  Systolic function was reduced  TAPSE 1 3      LEFT ATRIUM:  The atrium was moderately dilated      RIGHT ATRIUM:  The atrium was moderately dilated      MITRAL VALVE:  There was moderate regurgitation  The regurgitant jet was centrally directed      TRICUSPID VALVE:  There was mild to moderate regurgitation  Estimated peak PA pressure was 35 mmHg  Assuming a right atrial pressure of 15 mmHg      IVC, HEPATIC VEINS:  The inferior vena cava was dilated  Respirophasic changes in dimension were absent      COMPARISONS:  Compared to limited echocardiogram 8/22/2018, the EF is signicantly lower  Previous EF 62 5%  Patient also had cardiac catheterization done in 2018, which was normal      CASEY (acute kidney injury) [facility])  Assessment & Plan  Lisinopril on hold  Patient received 500 cc bolus yesterday, will give another 500 cc bolus and increase p o  Hydration  Recheck BMP in a m  Abnormal TSH  Assessment & Plan  TSH came back 0 147> 0 172   free T3, T4-is in normal range  Patient was on amiodarone previously  Will try to reach Endocrinology to discuss this issue    Cardiomyopathy [facility])  Assessment & Plan  Most likely secondary to tachyarrhythmia related  Previous echocardiogram shows: Moderate spontaneous echo contrast seen in LA appendage  Echogeneic structure seen in appendage concerning for MOLLY thrombus  Left ventricle is mildly dilated  Severely reduced left ventricular systolic function  Global     hypokinesis   Left ventricular ejection fraction is 20%     Severely dilated left atrium  Severely dilated right atrium  Enlarged right ventricular size  Reduced right ventricular systolic function      Cardiology consult appreciated  Continue beta-blocker, aspirin, statin,   lisinopril-discontinued due to elevated creatinine  ECHO (8/5/20):     LEFT VENTRICLE:  The ventricle was mildly dilated  Systolic function was moderately reduced  Ejection fraction was estimated in the range of 25 % to 30 %  There was diffuse hypokinesis  Wall thickness was at the upper limits of normal   Left ventricular diastolic function not assessed due to atrial fibrillation      RIGHT VENTRICLE:  The ventricle was dilated  Systolic function was reduced  TAPSE 1 3      LEFT ATRIUM:  The atrium was moderately dilated      RIGHT ATRIUM:  The atrium was moderately dilated      MITRAL VALVE:  There was moderate regurgitation  The regurgitant jet was centrally directed      TRICUSPID VALVE:  There was mild to moderate regurgitation  Estimated peak PA pressure was 35 mmHg  Assuming a right atrial pressure of 15 mmHg      IVC, HEPATIC VEINS:  The inferior vena cava was dilated  Respirophasic changes in dimension were absent      COMPARISONS:  Compared to limited echocardiogram 8/22/2018, the EF is signicantly lower  Previous EF 62 5%  VTE Pharmacologic Prophylaxis:   Pharmacologic: Apixaban (Eliquis)  Mechanical VTE Prophylaxis in Place: Yes    Patient Centered Rounds: I have performed bedside rounds with nursing staff today  Discussions with Specialists or Other Care Team Provider:  Cardiology    Education and Discussions with Family / Patient:  Yes with patient    Time Spent for Care: 30 minutes  More than 50% of total time spent on counseling and coordination of care as described above      Current Length of Stay: 3 day(s)    Current Patient Status: Inpatient   Certification Statement: The patient will continue to require additional inpatient hospital stay due to AFib with RVR status post cardioversion, cardiomyopathy, CASEY    Discharge Plan / Estimated Discharge Date: To be determined      Code Status: Level 1 - Full Code      Subjective:   Seen and evaluated during the round  Patient denies any significant complaint  Objective:     Vitals:   Temp (24hrs), Av 7 °F (36 5 °C), Min:97 4 °F (36 3 °C), Max:98 °F (36 7 °C)    Temp:  [97 4 °F (36 3 °C)-98 °F (36 7 °C)] 97 9 °F (36 6 °C)  HR:  [] 84  Resp:  [13-22] 13  BP: ()/(58-81) 119/78  SpO2:  [91 %-98 %] 97 %  Body mass index is 24 94 kg/m²  Input and Output Summary (last 24 hours): Intake/Output Summary (Last 24 hours) at 2020 1138  Last data filed at 2020 0801  Gross per 24 hour   Intake 540 ml   Output 800 ml   Net -260 ml       Physical Exam:     Physical Exam  Vitals signs and nursing note reviewed  Constitutional:       Appearance: He is not diaphoretic  Eyes:      General: No scleral icterus  Extraocular Movements: Extraocular movements intact  Conjunctiva/sclera: Conjunctivae normal       Pupils: Pupils are equal, round, and reactive to light  Neck:      Musculoskeletal: Normal range of motion  Cardiovascular:      Rate and Rhythm: Normal rate  Heart sounds: No murmur  No gallop  Pulmonary:      Effort: Pulmonary effort is normal  No respiratory distress  Breath sounds: No wheezing  Abdominal:      General: Abdomen is flat  Bowel sounds are normal  There is no distension  Tenderness: There is no abdominal tenderness  There is no guarding  Musculoskeletal: Normal range of motion  Skin:     General: Skin is warm  Capillary Refill: Capillary refill takes less than 2 seconds  Neurological:      General: No focal deficit present  Mental Status: He is alert             Additional Data:     Labs:    Results from last 7 days   Lab Units 20  0551   WBC Thousand/uL 11 92*   HEMOGLOBIN g/dL 16 7   HEMATOCRIT % 51 7*   PLATELETS Thousands/uL 378 NEUTROS PCT % 53   LYMPHS PCT % 26   MONOS PCT % 11   EOS PCT % 9*     Results from last 7 days   Lab Units 08/07/20  0551   POTASSIUM mmol/L 4 3   CHLORIDE mmol/L 102   CO2 mmol/L 29   BUN mg/dL 20   CREATININE mg/dL 1 41*   CALCIUM mg/dL 8 8   ALK PHOS U/L 171*   ALT U/L 31   AST U/L 25     Results from last 7 days   Lab Units 08/04/20  1323   INR  1 12       * I Have Reviewed All Lab Data Listed Above  * Additional Pertinent Lab Tests Reviewed: All Labs Within Last 24 Hours Reviewed      Last 24 Hours Medication List:   acetaminophen, 650 mg, Oral, Q6H PRN, Lewis Bird MD  aluminum-magnesium hydroxide-simethicone, 30 mL, Oral, Q6H PRN, Dmitriy Mays MD  amiodarone, 200 mg, Oral, TID With Meals, Mary Garcia DO  apixaban, 5 mg, Oral, BID, Dmitriy Mays MD  atorvastatin, 40 mg, Oral, Daily With combionic Plants, MD  calcium carbonate, 1,000 mg, Oral, Daily PRN, Lewis Bird MD  docusate sodium, 100 mg, Oral, BID, Dmitriy Mays MD  magnesium oxide, 400 mg, Oral, BID, Dmitriy Mays MD  metoprolol, 5 mg, Intravenous, Q6H PRN, Lewis Bird MD  metoprolol succinate, 50 mg, Oral, BID, Mary Garcia, DO  ondansetron, 4 mg, Intravenous, Q6H PRN, Lewis Bird MD  rOPINIRole, 0 5 mg, Oral, HS, Reginald Troncoso MD  sodium chloride, 500 mL, Intravenous, Once, Lewis Bird MD, Last Rate: 500 mL (08/07/20 1034)         Today, Patient Was Seen By: Lewis Bird MD    ** Please Note: Dragon 360 Dictation voice to text software may have been used in the creation of this document   **

## 2020-08-07 NOTE — ASSESSMENT & PLAN NOTE
Present on admission  Patient received digoxin 250 mcgm X 2, Lopressor 5 mg x 2, Cardizem 15 mg, then 20 mg IV  Patient was also placed on Cardizem drip which discontinued  Currently rate controlled  Continue metoprolol 75 mg p o  B i d -and titrate as long as heart rate tolerated  Lopressor IV as needed  Continue aspirin, Eliquis  As per chart review,Hx of Afib s/p cardioversion; Hx of LA thrombus   Patient was admitted to [facility] in 1/2018 for this where he was found to be in Afib with RVR  He did have an echo that demonstrated LA thrombus so no cardioversion was performed initially  he was rate controlled and anticoagulated with eliquis  Subsequent KAYLYN on 3/2018 demonstrated no thormbus so he was successful cardioverted  On telemetry, patient was running atrial flutter this morning-patient did not receive his beta-blocker due to holding parameter  Continue beta-blocker and monitor  If patient remained stable, plan is to discharge in a m  [id number]: Moderate spontaneous echo contrast seen in LA appendage  Echogeneic structure seen in appendage concerning for MOLLY thrombus  Left ventricle is mildly dilated  Severely reduced left ventricular systolic function  Global     hypokinesis  Left ventricular ejection fraction is 20%  Severely dilated left atrium  Severely dilated right atrium  Enlarged right ventricular size  Reduced right ventricular systolic function        S/P KAYLYN Cardioversion (8/6/20) -a single synchronized biphasic shock at 150J, converted in sinus rhythm   Maintain potassium more than 4, magnesium more than 2  Repeat ECHO (8/5/20):     LEFT VENTRICLE:  The ventricle was mildly dilated  Systolic function was moderately reduced  Ejection fraction was estimated in the range of 25 % to 30 %  There was diffuse hypokinesis    Wall thickness was at the upper limits of normal   Left ventricular diastolic function not assessed due to atrial fibrillation      RIGHT VENTRICLE:  The ventricle was dilated  Systolic function was reduced  TAPSE 1 3      LEFT ATRIUM:  The atrium was moderately dilated      RIGHT ATRIUM:  The atrium was moderately dilated      MITRAL VALVE:  There was moderate regurgitation  The regurgitant jet was centrally directed      TRICUSPID VALVE:  There was mild to moderate regurgitation  Estimated peak PA pressure was 35 mmHg  Assuming a right atrial pressure of 15 mmHg      IVC, HEPATIC VEINS:  The inferior vena cava was dilated  Respirophasic changes in dimension were absent      COMPARISONS:  Compared to limited echocardiogram 8/22/2018, the EF is signicantly lower  Previous EF 62 5%

## 2020-08-08 VITALS
HEART RATE: 77 BPM | SYSTOLIC BLOOD PRESSURE: 122 MMHG | WEIGHT: 190.92 LBS | RESPIRATION RATE: 18 BRPM | OXYGEN SATURATION: 98 % | HEIGHT: 73 IN | TEMPERATURE: 97.4 F | BODY MASS INDEX: 25.3 KG/M2 | DIASTOLIC BLOOD PRESSURE: 82 MMHG

## 2020-08-08 LAB
ANION GAP SERPL CALCULATED.3IONS-SCNC: 8 MMOL/L (ref 4–13)
BUN SERPL-MCNC: 15 MG/DL (ref 5–25)
CALCIUM SERPL-MCNC: 9.3 MG/DL (ref 8.3–10.1)
CHLORIDE SERPL-SCNC: 101 MMOL/L (ref 100–108)
CO2 SERPL-SCNC: 28 MMOL/L (ref 21–32)
CREAT SERPL-MCNC: 1.38 MG/DL (ref 0.6–1.3)
GFR SERPL CREATININE-BSD FRML MDRD: 57 ML/MIN/1.73SQ M
GLUCOSE SERPL-MCNC: 106 MG/DL (ref 65–140)
POTASSIUM SERPL-SCNC: 4.7 MMOL/L (ref 3.5–5.3)
SODIUM SERPL-SCNC: 137 MMOL/L (ref 136–145)
THYROPEROXIDASE AB SERPL-ACNC: <9 IU/ML (ref 0–34)

## 2020-08-08 PROCEDURE — 99239 HOSP IP/OBS DSCHRG MGMT >30: CPT | Performed by: FAMILY MEDICINE

## 2020-08-08 PROCEDURE — 80048 BASIC METABOLIC PNL TOTAL CA: CPT | Performed by: FAMILY MEDICINE

## 2020-08-08 RX ORDER — ROPINIROLE 0.5 MG/1
0.5 TABLET, FILM COATED ORAL
Qty: 30 TABLET | Refills: 0 | Status: SHIPPED | OUTPATIENT
Start: 2020-08-08 | End: 2020-09-08 | Stop reason: SDUPTHER

## 2020-08-08 RX ORDER — AMIODARONE HYDROCHLORIDE 200 MG/1
200 TABLET ORAL
Qty: 90 TABLET | Refills: 0 | Status: SHIPPED | OUTPATIENT
Start: 2020-08-08 | End: 2020-09-08

## 2020-08-08 RX ORDER — ASPIRIN 81 MG/1
81 TABLET ORAL DAILY
Qty: 30 TABLET | Refills: 0 | Status: SHIPPED | OUTPATIENT
Start: 2020-08-08 | End: 2020-09-08 | Stop reason: ALTCHOICE

## 2020-08-08 RX ORDER — ATORVASTATIN CALCIUM 40 MG/1
40 TABLET, FILM COATED ORAL
Qty: 30 TABLET | Refills: 0 | Status: SHIPPED | OUTPATIENT
Start: 2020-08-08 | End: 2020-09-08 | Stop reason: SDUPTHER

## 2020-08-08 RX ORDER — METOPROLOL SUCCINATE 50 MG/1
50 TABLET, EXTENDED RELEASE ORAL 2 TIMES DAILY
Qty: 60 TABLET | Refills: 0 | Status: SHIPPED | OUTPATIENT
Start: 2020-08-08 | End: 2020-09-08 | Stop reason: SDUPTHER

## 2020-08-08 RX ADMIN — APIXABAN 5 MG: 5 TABLET, FILM COATED ORAL at 08:11

## 2020-08-08 RX ADMIN — Medication 400 MG: at 08:11

## 2020-08-08 RX ADMIN — AMIODARONE HYDROCHLORIDE 200 MG: 200 TABLET ORAL at 11:43

## 2020-08-08 RX ADMIN — METOPROLOL SUCCINATE 50 MG: 50 TABLET, EXTENDED RELEASE ORAL at 08:11

## 2020-08-08 RX ADMIN — AMIODARONE HYDROCHLORIDE 200 MG: 200 TABLET ORAL at 08:11

## 2020-08-08 NOTE — ASSESSMENT & PLAN NOTE
Creatinine is 1 3  Increase p o  Hydration  Patient can restart lisinopril once creatinine normalized  Patient also needs to add Aldactone and Lasix dependent on volume status wants creatinine is normalized

## 2020-08-08 NOTE — PLAN OF CARE
Problem: Potential for Falls  Goal: Patient will remain free of falls  Description: INTERVENTIONS:  - Assess patient frequently for physical needs  -  Identify cognitive and physical deficits and behaviors that affect risk of falls    -  [place] fall precautions as indicated by assessment   - Educate patient/family on patient safety including physical limitations  - Instruct patient to call for assistance with activity based on assessment  - Modify environment to reduce risk of injury  - Consider OT/PT consult to assist with strengthening/mobility  Outcome: Progressing     Problem: CARDIOVASCULAR - ADULT  Goal: Maintains optimal cardiac output and hemodynamic stability  Description: INTERVENTIONS:  - Monitor I/O, vital signs and rhythm  - Monitor for S/S and trends of decreased cardiac output  - Administer and titrate ordered vasoactive medications to optimize hemodynamic stability  - Assess quality of pulses, skin color and temperature  - Assess for signs of decreased coronary artery perfusion  - Instruct patient to report change in severity of symptoms  Outcome: Progressing  Goal: Absence of cardiac dysrhythmias or at baseline rhythm  Description: INTERVENTIONS:  - Continuous cardiac monitoring, vital signs, obtain 12 lead EKG if ordered  - Administer antiarrhythmic and heart rate control medications as ordered  - Monitor electrolytes and administer replacement therapy as ordered  Outcome: Progressing     Problem: PAIN - ADULT  Goal: Verbalizes/displays adequate comfort level or baseline comfort level  Description: Interventions:  - Encourage patient to monitor pain and request assistance  - Assess pain using appropriate pain scale  - Administer analgesics based on type and severity of pain and evaluate response  - Implement non-pharmacological measures as appropriate and evaluate response  - Consider cultural and social influences on pain and pain management  - Notify physician/advanced practitioner if interventions unsuccessful or patient reports new pain  Outcome: Progressing     Problem: INFECTION - ADULT  Goal: Absence or prevention of progression during hospitalization  Description: INTERVENTIONS:  - Assess and monitor for signs and symptoms of infection  - Monitor lab/diagnostic results  - Monitor all insertion sites, i e  indwelling lines, tubes, and drains  - Monitor endotracheal if appropriate and nasal secretions for changes in amount and color  - [place] appropriate cooling/warming therapies per order  - Administer medications as ordered  - Instruct and encourage patient and family to use good hand hygiene technique  - Identify and instruct in appropriate isolation precautions for identified infection/condition  Outcome: Progressing     Problem: SAFETY ADULT  Goal: Patient will remain free of falls  Description: INTERVENTIONS:  - Assess patient frequently for physical needs  -  Identify cognitive and physical deficits and behaviors that affect risk of falls    -  [place] fall precautions as indicated by assessment   - Educate patient/family on patient safety including physical limitations  - Instruct patient to call for assistance with activity based on assessment  - Modify environment to reduce risk of injury  - Consider OT/PT consult to assist with strengthening/mobility  Outcome: Progressing  Goal: Maintain or return to baseline ADL function  Description: INTERVENTIONS:  -  Assess patient's ability to carry out ADLs; assess patient's baseline for ADL function and identify physical deficits which impact ability to perform ADLs (bathing, care of mouth/teeth, toileting, grooming, dressing, etc )  - Assess/evaluate cause of self-care deficits   - Assess range of motion  - Assess patient's mobility; develop plan if impaired  - Assess patient's need for assistive devices and provide as appropriate  - Encourage maximum independence but intervene and supervise when necessary  - Involve family in performance of ADLs  - Assess for home care needs following discharge   - Consider OT consult to assist with ADL evaluation and planning for discharge  - Provide patient education as appropriate  Outcome: Progressing  Goal: Maintain or return mobility status to optimal level  Description: INTERVENTIONS:  - Assess patient's baseline mobility status (ambulation, transfers, stairs, etc )    - Identify cognitive and physical deficits and behaviors that affect mobility  - Identify mobility aids required to assist with transfers and/or ambulation (gait belt, sit-to-stand, lift, walker, cane, etc )  - [place] fall precautions as indicated by assessment  - Record patient progress and toleration of activity level on Mobility SBAR; progress patient to next Phase/Stage  - Instruct patient to call for assistance with activity based on assessment  - Consider rehabilitation consult to assist with strengthening/weightbearing, etc   Outcome: Progressing     Problem: DISCHARGE PLANNING  Goal: Discharge to home or other facility with appropriate resources  Description: INTERVENTIONS:  - Identify barriers to discharge w/patient and caregiver  - Arrange for needed discharge resources and transportation as appropriate  - Identify discharge learning needs (meds, wound care, etc )  - Arrange for interpretive services to assist at discharge as needed  - Refer to Case Management Department for coordinating discharge planning if the patient needs post-hospital services based on physician/advanced practitioner order or complex needs related to functional status, cognitive ability, or social support system  Outcome: Progressing     Problem: Knowledge Deficit  Goal: Patient/family/caregiver demonstrates understanding of disease process, treatment plan, medications, and discharge instructions  Description: Complete learning assessment and assess knowledge base    Interventions:  - Provide teaching at level of understanding  - Provide teaching via preferred learning methods  Outcome: Progressing

## 2020-08-08 NOTE — NURSING NOTE
Patient given and explained education packets on eliquis, ASA, and magnesium oxide  He verbalized understanding

## 2020-08-08 NOTE — SOCIAL WORK
1100 late entry  Cm gave pt coupon for Elequis, 30 day supply free  CM educated to take coupon with him to the pharmacy  Pt verbalizes understanding

## 2020-08-08 NOTE — ASSESSMENT & PLAN NOTE
TSH came back 0 147> 0 172   free T3, T4-is in normal range  Patient was on amiodarone previously  Discussed the case with endocrinologist over the phone  Suggested to do workup  But patient does not need treatment at this time  Also patient is to repeat thyroid function test in 6 weeks    Thyroid ultrasound shows:Heterogeneous thyroid echotexture without discrete nodule  Thyroid peroxidase age was less than 9  Pending thyrotropin receptor antibody, TSH immunoglobulin

## 2020-08-08 NOTE — DISCHARGE INSTRUCTIONS
A-fib (Atrial Fibrillation)   WHAT YOU NEED TO KNOW:   A-fib may come and go, or it may be a long-term condition  A-fib can cause blood clots, stroke, or heart failure  These conditions may become life-threatening  It is important to treat and manage a-fib to help prevent a blood clot, stroke, or heart failure  DISCHARGE INSTRUCTIONS:   Call 911 for any of the following:   · You have any of the following signs of a heart attack:      ¨ Squeezing, pressure, or pain in your chest that lasts longer than 5 minutes or returns    ¨ Discomfort or pain in your back, neck, jaw, stomach, or arm     ¨ Trouble breathing    ¨ Nausea or vomiting    ¨ Lightheadedness or a sudden cold sweat, especially with chest pain or trouble breathing    · You have any of the following signs of a stroke:      ¨ Numbness or drooping on one side of your face     ¨ Weakness in an arm or leg    ¨ Confusion or difficulty speaking    ¨ Dizziness, a severe headache, or vision loss  Seek care immediately if:  You have any of the following signs of a blood clot:  · You feel lightheaded, are short of breath, and have chest pain  · You cough up blood  · You have swelling, redness, pain, or warmth in your arm or leg  Contact your cardiologist or healthcare provider if:   · Your heart rate is higher than your healthcare provider said it should be  · You have new or worsening swelling in your legs, feet, ankles, or abdomen  · You are short of breath, even at rest      · You have questions or concerns about your condition or care  Medicines: You may need any of the following:  · Heart medicines  help control your heart rate and rhythm  You may need more than one medicine to treat your symptoms  · Blood thinners    help prevent blood clots  Examples of blood thinners include heparin and warfarin  Clots can cause strokes, heart attacks, and death   The following are general safety guidelines to follow while you are taking a blood thinner:    ¨ Watch for bleeding and bruising while you take blood thinners  Watch for bleeding from your gums or nose  Watch for blood in your urine and bowel movements  Use a soft washcloth on your skin, and a soft toothbrush to brush your teeth  This can keep your skin and gums from bleeding  If you shave, use an electric shaver  Do not play contact sports  ¨ Tell your dentist and other healthcare providers that you take anticoagulants  Wear a bracelet or necklace that says you take this medicine  ¨ Do not start or stop any medicines unless your healthcare provider tells you to  Many medicines cannot be used with blood thinners  ¨ Tell your healthcare provider right away if you forget to take the medicine, or if you take too much  ¨ Warfarin  is a blood thinner that you may need to take  The following are things you should be aware of if you take warfarin  § Foods and medicines can affect the amount of warfarin in your blood  Do not make major changes to your diet while you take warfarin  Warfarin works best when you eat about the same amount of vitamin K every day  Vitamin K is found in green leafy vegetables and certain other foods  Ask for more information about what to eat when you are taking warfarin  § You will need to see your healthcare provider for follow-up visits when you are on warfarin  You will need regular blood tests  These tests are used to decide how much medicine you need  · Antiplatelets , such as aspirin, help prevent blood clots  Take your antiplatelet medicine exactly as directed  These medicines make it more likely for you to bleed or bruise  If you are told to take aspirin, do not take acetaminophen or ibuprofen instead  · Take your medicine as directed  Contact your healthcare provider if you think your medicine is not helping or if you have side effects  Tell him or her if you are allergic to any medicine   Keep a list of the medicines, vitamins, and herbs you take  Include the amounts, and when and why you take them  Bring the list or the pill bottles to follow-up visits  Carry your medicine list with you in case of an emergency  Follow up with your cardiologist as directed: You will need regular blood tests and monitoring  Write down your questions so you remember to ask them during your visits  Manage A-fib:   · Know your target heart rate  Learn how to take your pulse and monitor your heart rate  · Manage other health conditions  This includes high blood pressure, sleep apnea, thyroid disease, diabetes, and other heart conditions  Take medicine as directed and follow your treatment plan  · Limit or do not drink alcohol  Alcohol can make a-fib hard to manage  Ask your healthcare provider if it is safe for you to drink alcohol  A drink of alcohol is 12 ounces of beer, 5 ounces of wine, or 1½ ounces of liquor  · Do not smoke  Nicotine and other chemicals in cigarettes and cigars can cause heart and lung damage  Ask your healthcare provider for information if you currently smoke and need help to quit  E-cigarettes or smokeless tobacco still contain nicotine  Talk to your healthcare provider before you use these products  · Eat heart-healthy foods  Heart healthy foods will help keep your cholesterol low  These include fruits, vegetables, whole-grain breads, low-fat dairy products, beans, lean meats, and fish  Replace butter and margarine with heart-healthy oils such as olive oil and canola oil  · Maintain a healthy weight  Ask your healthcare provider how much you should weigh  Ask him to help you create a weight loss plan if you are overweight  · Exercise for 30 minutes  most days of the week  Ask your healthcare provider about the best exercise plan for you  © 2017 [employer] Information is for End User's use only and may not be sold, redistributed or otherwise used for commercial purposes   All illustrations and images included in CareNotes® are the copyrighted property of [employer] M [employer]  or [employer]  The above information is an  only  It is not intended as medical advice for individual conditions or treatments  Talk to your doctor, nurse or pharmacist before following any medical regimen to see if it is safe and effective for you

## 2020-08-08 NOTE — ASSESSMENT & PLAN NOTE
Present on admission  Patient received digoxin 250 mcgm X 2, Lopressor 5 mg x 2, Cardizem 15 mg, then 20 mg IV  Patient was also placed on Cardizem drip which discontinued  Currently rate controlled  Continue metoprolol 75 mg p o  B i d -and titrate as long as heart rate tolerated  Lopressor IV as needed  Continue aspirin, Eliquis  As per chart review,Hx of Afib s/p cardioversion; Hx of LA thrombus   Patient was admitted to [facility] in 1/2018 for this where he was found to be in Afib with RVR  He did have an echo that demonstrated LA thrombus so no cardioversion was performed initially  he was rate controlled and anticoagulated with eliquis  Subsequent KAYLYN on 3/2018 demonstrated no thormbus so he was successful cardioverted  On telemetry, patient was running atrial flutter this morning-patient did not receive his beta-blocker due to holding parameter  Continue beta-blocker and monitor  If patient remained stable, plan is to discharge in a m  [facility]-[id number]: Moderate spontaneous echo contrast seen in LA appendage  Echogeneic structure seen in appendage concerning for MOLLY thrombus  Left ventricle is mildly dilated  Severely reduced left ventricular systolic function  Global     hypokinesis  Left ventricular ejection fraction is 20%  Severely dilated left atrium  Severely dilated right atrium  Enlarged right ventricular size  Reduced right ventricular systolic function        S/P KAYLYN Cardioversion (8/6/20) -a single synchronized biphasic shock at 150J, converted in sinus rhythm   Maintain potassium more than 4, magnesium more than 2  Repeat ECHO (8/5/20):     LEFT VENTRICLE:  The ventricle was mildly dilated  Systolic function was moderately reduced  Ejection fraction was estimated in the range of 25 % to 30 %  There was diffuse hypokinesis    Wall thickness was at the upper limits of normal   Left ventricular diastolic function not assessed due to atrial fibrillation      RIGHT VENTRICLE:  The ventricle was dilated  Systolic function was reduced  TAPSE 1 3      LEFT ATRIUM:  The atrium was moderately dilated      RIGHT ATRIUM:  The atrium was moderately dilated      MITRAL VALVE:  There was moderate regurgitation  The regurgitant jet was centrally directed      TRICUSPID VALVE:  There was mild to moderate regurgitation  Estimated peak PA pressure was 35 mmHg  Assuming a right atrial pressure of 15 mmHg      IVC, HEPATIC VEINS:  The inferior vena cava was dilated  Respirophasic changes in dimension were absent      COMPARISONS:  Compared to limited echocardiogram 8/22/2018, the EF is signicantly lower  Previous EF 62 5%

## 2020-08-08 NOTE — PLAN OF CARE
Problem: Potential for Falls  Goal: Patient will remain free of falls  Description: INTERVENTIONS:  - Assess patient frequently for physical needs  -  Identify cognitive and physical deficits and behaviors that affect risk of falls    -  [place] fall precautions as indicated by assessment   - Educate patient/family on patient safety including physical limitations  - Instruct patient to call for assistance with activity based on assessment  - Modify environment to reduce risk of injury  - Consider OT/PT consult to assist with strengthening/mobility  Outcome: Progressing     Problem: CARDIOVASCULAR - ADULT  Goal: Maintains optimal cardiac output and hemodynamic stability  Description: INTERVENTIONS:  - Monitor I/O, vital signs and rhythm  - Monitor for S/S and trends of decreased cardiac output  - Administer and titrate ordered vasoactive medications to optimize hemodynamic stability  - Assess quality of pulses, skin color and temperature  - Assess for signs of decreased coronary artery perfusion  - Instruct patient to report change in severity of symptoms  Outcome: Progressing  Goal: Absence of cardiac dysrhythmias or at baseline rhythm  Description: INTERVENTIONS:  - Continuous cardiac monitoring, vital signs, obtain 12 lead EKG if ordered  - Administer antiarrhythmic and heart rate control medications as ordered  - Monitor electrolytes and administer replacement therapy as ordered  Outcome: Progressing     Problem: PAIN - ADULT  Goal: Verbalizes/displays adequate comfort level or baseline comfort level  Description: Interventions:  - Encourage patient to monitor pain and request assistance  - Assess pain using appropriate pain scale  - Administer analgesics based on type and severity of pain and evaluate response  - Implement non-pharmacological measures as appropriate and evaluate response  - Consider cultural and social influences on pain and pain management  - Notify physician/advanced practitioner if interventions unsuccessful or patient reports new pain  Outcome: Progressing     Problem: INFECTION - ADULT  Goal: Absence or prevention of progression during hospitalization  Description: INTERVENTIONS:  - Assess and monitor for signs and symptoms of infection  - Monitor lab/diagnostic results  - Monitor all insertion sites, i e  indwelling lines, tubes, and drains  - Monitor endotracheal if appropriate and nasal secretions for changes in amount and color  - [place] appropriate cooling/warming therapies per order  - Administer medications as ordered  - Instruct and encourage patient and family to use good hand hygiene technique  - Identify and instruct in appropriate isolation precautions for identified infection/condition  Outcome: Progressing  Goal: Absence of fever/infection during neutropenic period  Description: INTERVENTIONS:  - Monitor WBC    Outcome: Progressing     Problem: SAFETY ADULT  Goal: Patient will remain free of falls  Description: INTERVENTIONS:  - Assess patient frequently for physical needs  -  Identify cognitive and physical deficits and behaviors that affect risk of falls    -  [place] fall precautions as indicated by assessment   - Educate patient/family on patient safety including physical limitations  - Instruct patient to call for assistance with activity based on assessment  - Modify environment to reduce risk of injury  - Consider OT/PT consult to assist with strengthening/mobility  Outcome: Progressing  Goal: Maintain or return to baseline ADL function  Description: INTERVENTIONS:  -  Assess patient's ability to carry out ADLs; assess patient's baseline for ADL function and identify physical deficits which impact ability to perform ADLs (bathing, care of mouth/teeth, toileting, grooming, dressing, etc )  - Assess/evaluate cause of self-care deficits   - Assess range of motion  - Assess patient's mobility; develop plan if impaired  - Assess patient's need for assistive devices and provide as appropriate  - Encourage maximum independence but intervene and supervise when necessary  - Involve family in performance of ADLs  - Assess for home care needs following discharge   - Consider OT consult to assist with ADL evaluation and planning for discharge  - Provide patient education as appropriate  Outcome: Progressing  Goal: Maintain or return mobility status to optimal level  Description: INTERVENTIONS:  - Assess patient's baseline mobility status (ambulation, transfers, stairs, etc )    - Identify cognitive and physical deficits and behaviors that affect mobility  - Identify mobility aids required to assist with transfers and/or ambulation (gait belt, sit-to-stand, lift, walker, cane, etc )  - [place] fall precautions as indicated by assessment  - Record patient progress and toleration of activity level on Mobility SBAR; progress patient to next Phase/Stage  - Instruct patient to call for assistance with activity based on assessment  - Consider rehabilitation consult to assist with strengthening/weightbearing, etc   Outcome: Progressing     Problem: DISCHARGE PLANNING  Goal: Discharge to home or other facility with appropriate resources  Description: INTERVENTIONS:  - Identify barriers to discharge w/patient and caregiver  - Arrange for needed discharge resources and transportation as appropriate  - Identify discharge learning needs (meds, wound care, etc )  - Arrange for interpretive services to assist at discharge as needed  - Refer to Case Management Department for coordinating discharge planning if the patient needs post-hospital services based on physician/advanced practitioner order or complex needs related to functional status, cognitive ability, or social support system  Outcome: Progressing     Problem: Knowledge Deficit  Goal: Patient/family/caregiver demonstrates understanding of disease process, treatment plan, medications, and discharge instructions  Description: Complete learning assessment and assess knowledge base    Interventions:  - Provide teaching at level of understanding  - Provide teaching via preferred learning methods  Outcome: Progressing

## 2020-08-08 NOTE — PLAN OF CARE
Problem: Potential for Falls  Goal: Patient will remain free of falls  Description: INTERVENTIONS:  - Assess patient frequently for physical needs  -  Identify cognitive and physical deficits and behaviors that affect risk of falls    -  [place] fall precautions as indicated by assessment   - Educate patient/family on patient safety including physical limitations  - Instruct patient to call for assistance with activity based on assessment  - Modify environment to reduce risk of injury  - Consider OT/PT consult to assist with strengthening/mobility  8/8/2020 1102 by Natalie Snell RN  Outcome: Adequate for Discharge  8/8/2020 0750 by Natalie Snell RN  Outcome: Progressing     Problem: CARDIOVASCULAR - ADULT  Goal: Maintains optimal cardiac output and hemodynamic stability  Description: INTERVENTIONS:  - Monitor I/O, vital signs and rhythm  - Monitor for S/S and trends of decreased cardiac output  - Administer and titrate ordered vasoactive medications to optimize hemodynamic stability  - Assess quality of pulses, skin color and temperature  - Assess for signs of decreased coronary artery perfusion  - Instruct patient to report change in severity of symptoms  8/8/2020 1102 by Natalie Snell RN  Outcome: Adequate for Discharge  8/8/2020 0750 by Natalie Snell RN  Outcome: Progressing  Goal: Absence of cardiac dysrhythmias or at baseline rhythm  Description: INTERVENTIONS:  - Continuous cardiac monitoring, vital signs, obtain 12 lead EKG if ordered  - Administer antiarrhythmic and heart rate control medications as ordered  - Monitor electrolytes and administer replacement therapy as ordered  8/8/2020 1102 by Natalie Snell RN  Outcome: Adequate for Discharge  8/8/2020 0750 by Natalie Snell RN  Outcome: Progressing     Problem: PAIN - ADULT  Goal: Verbalizes/displays adequate comfort level or baseline comfort level  Description: Interventions:  - Encourage patient to monitor pain and request assistance  - Assess pain using appropriate pain scale  - Administer analgesics based on type and severity of pain and evaluate response  - Implement non-pharmacological measures as appropriate and evaluate response  - Consider cultural and social influences on pain and pain management  - Notify physician/advanced practitioner if interventions unsuccessful or patient reports new pain  8/8/2020 1102 by Aline Galindo RN  Outcome: Adequate for Discharge  8/8/2020 0750 by Aline Galindo RN  Outcome: Progressing     Problem: INFECTION - ADULT  Goal: Absence or prevention of progression during hospitalization  Description: INTERVENTIONS:  - Assess and monitor for signs and symptoms of infection  - Monitor lab/diagnostic results  - Monitor all insertion sites, i e  indwelling lines, tubes, and drains  - Monitor endotracheal if appropriate and nasal secretions for changes in amount and color  - [place] appropriate cooling/warming therapies per order  - Administer medications as ordered  - Instruct and encourage patient and family to use good hand hygiene technique  - Identify and instruct in appropriate isolation precautions for identified infection/condition  8/8/2020 1102 by Aline Galindo RN  Outcome: Adequate for Discharge  8/8/2020 0750 by Aline Galindo RN  Outcome: Progressing     Problem: SAFETY ADULT  Goal: Patient will remain free of falls  Description: INTERVENTIONS:  - Assess patient frequently for physical needs  -  Identify cognitive and physical deficits and behaviors that affect risk of falls    -  [place] fall precautions as indicated by assessment   - Educate patient/family on patient safety including physical limitations  - Instruct patient to call for assistance with activity based on assessment  - Modify environment to reduce risk of injury  - Consider OT/PT consult to assist with strengthening/mobility  8/8/2020 1102 by Aline Galindo RN  Outcome: Adequate for Discharge  8/8/2020 0750 by Lavelle Ramírez RN  Outcome: Progressing  Goal: Maintain or return to baseline ADL function  Description: INTERVENTIONS:  -  Assess patient's ability to carry out ADLs; assess patient's baseline for ADL function and identify physical deficits which impact ability to perform ADLs (bathing, care of mouth/teeth, toileting, grooming, dressing, etc )  - Assess/evaluate cause of self-care deficits   - Assess range of motion  - Assess patient's mobility; develop plan if impaired  - Assess patient's need for assistive devices and provide as appropriate  - Encourage maximum independence but intervene and supervise when necessary  - Involve family in performance of ADLs  - Assess for home care needs following discharge   - Consider OT consult to assist with ADL evaluation and planning for discharge  - Provide patient education as appropriate  8/8/2020 1102 by Lavelle Ramírez RN  Outcome: Adequate for Discharge  8/8/2020 0750 by Lavelle Ramírez RN  Outcome: Progressing  Goal: Maintain or return mobility status to optimal level  Description: INTERVENTIONS:  - Assess patient's baseline mobility status (ambulation, transfers, stairs, etc )    - Identify cognitive and physical deficits and behaviors that affect mobility  - Identify mobility aids required to assist with transfers and/or ambulation (gait belt, sit-to-stand, lift, walker, cane, etc )  - [place] fall precautions as indicated by assessment  - Record patient progress and toleration of activity level on Mobility SBAR; progress patient to next Phase/Stage  - Instruct patient to call for assistance with activity based on assessment  - Consider rehabilitation consult to assist with strengthening/weightbearing, etc   8/8/2020 1102 by Lavelle Ramírez RN  Outcome: Adequate for Discharge  8/8/2020 0750 by Lavelle Ramírez RN  Outcome: Progressing     Problem: DISCHARGE PLANNING  Goal: Discharge to home or other facility with appropriate resources  Description: INTERVENTIONS:  - Identify barriers to discharge w/patient and caregiver  - Arrange for needed discharge resources and transportation as appropriate  - Identify discharge learning needs (meds, wound care, etc )  - Arrange for interpretive services to assist at discharge as needed  - Refer to Case Management Department for coordinating discharge planning if the patient needs post-hospital services based on physician/advanced practitioner order or complex needs related to functional status, cognitive ability, or social support system  8/8/2020 1102 by Floridalma Llanos RN  Outcome: Adequate for Discharge  8/8/2020 0750 by Floridalma Llanos RN  Outcome: Progressing     Problem: Knowledge Deficit  Goal: Patient/family/caregiver demonstrates understanding of disease process, treatment plan, medications, and discharge instructions  Description: Complete learning assessment and assess knowledge base    Interventions:  - Provide teaching at level of understanding  - Provide teaching via preferred learning methods  8/8/2020 1102 by Floridalma Llanos RN  Outcome: Adequate for Discharge  8/8/2020 0750 by Floridalma Llanos RN  Outcome: Progressing

## 2020-08-08 NOTE — ASSESSMENT & PLAN NOTE
Wt Readings from Last 3 Encounters:   08/08/20 86 6 kg (190 lb 14 7 oz)     Strict intake and output  Low-sodium diet  Continue , beta-blocker, aspirin  Lasix and lisinopril on hold due to elevated creatinine  May resume once creatinine is normalized  Patient also be benefited from Aldactone once kidney function is normalized  Cardiology consult appreciated  Previous echocardiogram in 2018: Moderate spontaneous echo contrast seen in LA appendage  Echogeneic structure seen in appendage concerning for MOLLY thrombus  Left ventricle is mildly dilated  Severely reduced left ventricular systolic function  Global     hypokinesis  Left ventricular ejection fraction is 20%  Severely dilated left atrium  Severely dilated right atrium  Enlarged right ventricular size  Reduced right ventricular systolic function        Repeat ECHO (8/5/20):     LEFT VENTRICLE:  The ventricle was mildly dilated  Systolic function was moderately reduced  Ejection fraction was estimated in the range of 25 % to 30 %  There was diffuse hypokinesis  Wall thickness was at the upper limits of normal   Left ventricular diastolic function not assessed due to atrial fibrillation      RIGHT VENTRICLE:  The ventricle was dilated  Systolic function was reduced  TAPSE 1 3      LEFT ATRIUM:  The atrium was moderately dilated      RIGHT ATRIUM:  The atrium was moderately dilated      MITRAL VALVE:  There was moderate regurgitation  The regurgitant jet was centrally directed      TRICUSPID VALVE:  There was mild to moderate regurgitation  Estimated peak PA pressure was 35 mmHg  Assuming a right atrial pressure of 15 mmHg      IVC, HEPATIC VEINS:  The inferior vena cava was dilated  Respirophasic changes in dimension were absent      COMPARISONS:  Compared to limited echocardiogram 8/22/2018, the EF is signicantly lower  Previous EF 62 5%      Patient also had cardiac catheterization done in 2018, which was normal

## 2020-08-08 NOTE — DISCHARGE SUMMARY
Discharge- Cheyanne Mancilla 1965, 63 y o  male MRN: [id number]    Unit/Bed#: -01 Encounter: [id number]    Primary Care Provider: Porsche Rodriguez DO   Date and time admitted to hospital: 8/4/2020  1:11 PM        Elevated troponin  Assessment & Plan  Most likely is demand secondary to AFib with RVR  No significant EKG change regarding ST-T elevation-patient does not complain any chest pain  Patient had previous cardiac catheterization in 2018, which was normal   Troponin: 0 15 X 3> 0 13    * Atrial fibrillation with RVR ([address] )  Assessment & Plan  Present on admission  Patient received digoxin 250 mcgm X 2, Lopressor 5 mg x 2, Cardizem 15 mg, then 20 mg IV  Patient was also placed on Cardizem drip which discontinued  Currently rate controlled  Continue metoprolol 75 mg p o  B i d -and titrate as long as heart rate tolerated  Lopressor IV as needed  Continue aspirin, Eliquis  As per chart review,Hx of Afib s/p cardioversion; Hx of LA thrombus   Patient was admitted to [facility] in 1/2018 for this where he was found to be in Afib with RVR  He did have an echo that demonstrated LA thrombus so no cardioversion was performed initially  he was rate controlled and anticoagulated with eliquis  Subsequent KAYLYN on 3/2018 demonstrated no thormbus so he was successful cardioverted  On telemetry, patient was running atrial flutter this morning-patient did not receive his beta-blocker due to holding parameter  Continue beta-blocker and monitor  If patient remained stable, plan is to discharge in a   [id number]: Moderate spontaneous echo contrast seen in LA appendage  Echogeneic structure seen in appendage concerning for MOLLY thrombus  Left ventricle is mildly dilated  Severely reduced left ventricular systolic function  Global     hypokinesis  Left ventricular ejection fraction is 20%  Severely dilated left atrium  Severely dilated right atrium  Enlarged right ventricular size   Reduced right ventricular systolic function        S/P KAYLYN Cardioversion (8/6/20) -a single synchronized biphasic shock at 150J, converted in sinus rhythm   Maintain potassium more than 4, magnesium more than 2  Repeat ECHO (8/5/20):     LEFT VENTRICLE:  The ventricle was mildly dilated  Systolic function was moderately reduced  Ejection fraction was estimated in the range of 25 % to 30 %  There was diffuse hypokinesis  Wall thickness was at the upper limits of normal   Left ventricular diastolic function not assessed due to atrial fibrillation      RIGHT VENTRICLE:  The ventricle was dilated  Systolic function was reduced  TAPSE 1 3      LEFT ATRIUM:  The atrium was moderately dilated      RIGHT ATRIUM:  The atrium was moderately dilated      MITRAL VALVE:  There was moderate regurgitation  The regurgitant jet was centrally directed      TRICUSPID VALVE:  There was mild to moderate regurgitation  Estimated peak PA pressure was 35 mmHg  Assuming a right atrial pressure of 15 mmHg      IVC, HEPATIC VEINS:  The inferior vena cava was dilated  Respirophasic changes in dimension were absent      COMPARISONS:  Compared to limited echocardiogram 8/22/2018, the EF is signicantly lower  Previous EF 62 5%  Acute on chronic systolic congestive heart failure (HCC)  Assessment & Plan  Wt Readings from Last 3 Encounters:   08/08/20 86 6 kg (190 lb 14 7 oz)     Strict intake and output  Low-sodium diet  Continue , beta-blocker, aspirin  Lasix and lisinopril on hold due to elevated creatinine  May resume once creatinine is normalized  Patient also be benefited from Aldactone once kidney function is normalized  Cardiology consult appreciated  Previous echocardiogram in 2018: Moderate spontaneous echo contrast seen in LA appendage  Echogeneic structure seen in appendage concerning for MOLLY thrombus  Left ventricle is mildly dilated  Severely reduced left ventricular systolic function  Global     hypokinesis   Left ventricular ejection fraction is 20%  Severely dilated left atrium  Severely dilated right atrium  Enlarged right ventricular size  Reduced right ventricular systolic function        Repeat ECHO (8/5/20):     LEFT VENTRICLE:  The ventricle was mildly dilated  Systolic function was moderately reduced  Ejection fraction was estimated in the range of 25 % to 30 %  There was diffuse hypokinesis  Wall thickness was at the upper limits of normal   Left ventricular diastolic function not assessed due to atrial fibrillation      RIGHT VENTRICLE:  The ventricle was dilated  Systolic function was reduced  TAPSE 1 3      LEFT ATRIUM:  The atrium was moderately dilated      RIGHT ATRIUM:  The atrium was moderately dilated      MITRAL VALVE:  There was moderate regurgitation  The regurgitant jet was centrally directed      TRICUSPID VALVE:  There was mild to moderate regurgitation  Estimated peak PA pressure was 35 mmHg  Assuming a right atrial pressure of 15 mmHg      IVC, HEPATIC VEINS:  The inferior vena cava was dilated  Respirophasic changes in dimension were absent      COMPARISONS:  Compared to limited echocardiogram 8/22/2018, the EF is signicantly lower  Previous EF 62 5%  Patient also had cardiac catheterization done in 2018, which was normal      [facility] (acute kidney injury) [facility])  Assessment & Plan  Creatinine is 1 3  Increase p o  Hydration  Patient can restart lisinopril once creatinine normalized  Patient also needs to add Aldactone and Lasix dependent on volume status wants creatinine is normalized  Abnormal TSH  Assessment & Plan  TSH came back 0 147> 0 172   free T3, T4-is in normal range  Patient was on amiodarone previously  Discussed the case with endocrinologist over the phone  Suggested to do workup  But patient does not need treatment at this time  Also patient is to repeat thyroid function test in 6 weeks    Thyroid ultrasound shows:Heterogeneous thyroid echotexture without discrete nodule  Thyroid peroxidase age was less than 9  Pending thyrotropin receptor antibody, TSH immunoglobulin    Cardiomyopathy [address] [address] )  Assessment & Plan  Most likely secondary to tachyarrhythmia related  Previous echocardiogram shows: Moderate spontaneous echo contrast seen in LA appendage  Echogeneic structure seen in appendage concerning for MOLLY thrombus  Left ventricle is mildly dilated  Severely reduced left ventricular systolic function  Global     hypokinesis  Left ventricular ejection fraction is 20%  Severely dilated left atrium  Severely dilated right atrium  Enlarged right ventricular size  Reduced right ventricular systolic function      Cardiology consult appreciated  Continue beta-blocker, aspirin, statin,  Once patient kidney function normalized, patient is to restart lisinopril and PCP can add Aldactone, Lasix  ECHO (8/5/20):     LEFT VENTRICLE:  The ventricle was mildly dilated  Systolic function was moderately reduced  Ejection fraction was estimated in the range of 25 % to 30 %  There was diffuse hypokinesis  Wall thickness was at the upper limits of normal   Left ventricular diastolic function not assessed due to atrial fibrillation      RIGHT VENTRICLE:  The ventricle was dilated  Systolic function was reduced  TAPSE 1 3      LEFT ATRIUM:  The atrium was moderately dilated      RIGHT ATRIUM:  The atrium was moderately dilated      MITRAL VALVE:  There was moderate regurgitation  The regurgitant jet was centrally directed      TRICUSPID VALVE:  There was mild to moderate regurgitation  Estimated peak PA pressure was 35 mmHg  Assuming a right atrial pressure of 15 mmHg      IVC, HEPATIC VEINS:  The inferior vena cava was dilated  Respirophasic changes in dimension were absent      COMPARISONS:  Compared to limited echocardiogram 8/22/2018, the EF is signicantly lower  Previous EF 62 5%          Discharging Physician / Practitioner: Gabo Bhatti MD  PCP: Trupti Kinney DO  Admission Date:   Admission Orders (From admission, onward)     Ordered        08/04/20 1533  Inpatient Admission (expected length of stay for this patient Order details is greater than two midnights)  Once                   Discharge Date: 08/08/20    Resolved Problems  Date Reviewed: 8/7/2020    None          Consultations During Hospital Stay:  · Cardiology  · Endocrinology (via over the phone)    Procedures Performed:   · KAYLYN/DCCV- on 8/6/20  · Thyroid ultrasound:Heterogeneous thyroid echotexture without discrete nodule  · Chest x-ray:  Normal   ECHO:LEFT VENTRICLE:  The ventricle was mildly dilated  Systolic function was moderately reduced  Ejection fraction was estimated in the range of 25 % to 30 %  There was diffuse hypokinesis  Wall thickness was at the upper limits of normal   Left ventricular diastolic function not assessed due to atrial fibrillation      RIGHT VENTRICLE:  The ventricle was dilated  Systolic function was reduced  TAPSE 1 3      LEFT ATRIUM:  The atrium was moderately dilated      RIGHT ATRIUM:  The atrium was moderately dilated      MITRAL VALVE:  There was moderate regurgitation  The regurgitant jet was centrally directed      TRICUSPID VALVE:  There was mild to moderate regurgitation  Estimated peak PA pressure was 35 mmHg  Assuming a right atrial pressure of 15 mmHg      IVC, HEPATIC VEINS:  The inferior vena cava was dilated  Respirophasic changes in dimension were absent      COMPARISONS:  · Compared to limited echocardiogram 8/22/2018, the EF is signicantly lower  Previous EF 62 5%      Significant Findings / Test Results:   Lab Results   Component Value Date    WBC 11 92 (H) 08/07/2020    HGB 16 7 08/07/2020    HCT 51 7 (H) 08/07/2020    MCV 87 08/07/2020     08/07/2020   ·   Lab Results   Component Value Date    SODIUM 137 08/08/2020    K 4 7 08/08/2020     08/08/2020    CO2 28 08/08/2020    AGAP 8 08/08/2020    BUN 15 08/08/2020    CREATININE 1 38 (H) 08/08/2020    GLUC 106 08/08/2020    CALCIUM 9 3 08/08/2020    AST 25 08/07/2020    ALT 31 08/07/2020    ALKPHOS 171 (H) 08/07/2020    TP 7 1 08/07/2020    TBILI 0 37 08/07/2020    EGFR 57 08/08/2020   ·   Lab Results   Component Value Date    PLS5LYPDHNCF 0 172 (L) 08/05/2020   ·   Lab Results   Component Value Date    XEE8GKRCODMU 0 172 (L) 08/05/2020   ·   ·     Incidental Findings:   · As mentioned above     Test Results Pending at Discharge (will require follow up):   · TSH hemoglobin, thyrotropin receptor antibody     Outpatient Tests Requested:  · Patient needs thyroid function test in 6 weeks    Complications:  none    Reason for Admission:  AFib with RVR    Hospital Course:     Ishmael Caba is a 63 y o  male patient who originally presented to the hospital on 8/4/2020 due to AFib with RVR  Patient received KAYLYN/DCCV on 8/6/20-with reversed to sinus rhythm  Patient started beta-blocker, Eliquis  Cardiology added amiodarone with expected treatment to 3-6 months  Patient remained hemodynamically stable  Plan is to discharge patient home with aspirin, beta-blocker, amiodarone, Eliquis  Patient may need to restart lisinopril once kidney function is normalized  Patient also may benefit from Aldactone and Lasix dependence on volume status once creatinine function is normal   As per echocardiogram, patient's ejection fraction was 20-25%  Patient is to follow-up with cardiologist in 1 week  During the hospitalization, patient thyroid function was abnormal, TSH was 0 17  Free T4 and T3 was normal   Discussed the case over the phone with endocrinologist, thyroid ultrasound done which shows heterogeneous abnormality without any nodularity  Thyroid peroxidase antibody is normal   Pending TSH immunoglobin and thyrotropin receptor antibody  As per endocrinologist, patient does not need any treatment at this time  Patient is to repeat his thyroid function in 6 weeks with PCP    PCP needs to place the order for thyroid function test       Please see above list of diagnoses and related plan for additional information  Condition at Discharge: stable     Discharge Day Visit / Exam:     Subjective:  Seen and evaluated during the round  Patient denies any significant complaint  Vitals: Blood Pressure: 122/82 (08/08/20 0734)  Pulse: 77 (08/08/20 0734)  Temperature: (!) 97 4 °F (36 3 °C) (08/08/20 0734)  Temp Source: Oral (08/06/20 2203)  Respirations: 18 (08/08/20 0734)  Height: 6' 1" (185 4 cm) (08/06/20 1121)  Weight - Scale: 86 6 kg (190 lb 14 7 oz) (08/08/20 0532)  SpO2: 98 % (08/08/20 0734)  Exam:   Physical Exam  Vitals signs and nursing note reviewed  Exam conducted with a chaperone present  Constitutional:       Appearance: He is not toxic-appearing or diaphoretic  HENT:      Nose: No rhinorrhea  Mouth/Throat:      Mouth: Mucous membranes are moist       Pharynx: Oropharynx is clear  No posterior oropharyngeal erythema  Eyes:      General: No scleral icterus  Extraocular Movements: Extraocular movements intact  Conjunctiva/sclera: Conjunctivae normal       Pupils: Pupils are equal, round, and reactive to light  Neck:      Musculoskeletal: Normal range of motion  Cardiovascular:      Rate and Rhythm: Normal rate and regular rhythm  Pulses: Normal pulses  Heart sounds: Normal heart sounds  No murmur  No friction rub  No gallop  Pulmonary:      Effort: Pulmonary effort is normal  No respiratory distress  Breath sounds: No wheezing or rales  Abdominal:      General: Abdomen is flat  Bowel sounds are normal    Musculoskeletal: Normal range of motion  General: No swelling or deformity  Right lower leg: No edema  Skin:     General: Skin is warm  Capillary Refill: Capillary refill takes less than 2 seconds  Neurological:      General: No focal deficit present  Mental Status: He is alert and oriented to person, place, and time  Mental status is at baseline     Psychiatric:         Mood and Affect: Mood normal        Discussion with Family: none    Discharge instructions/Information to patient and family:   See after visit summary for information provided to patient and family  Provisions for Follow-Up Care:  See after visit summary for information related to follow-up care and any pertinent home health orders  Disposition:     Home    For Discharges to [facility] SNF:   · Not Applicable to this Patient - Not Applicable to this Patient    Planned Readmission:  If symptoms get worse     Discharge Statement:  I spent >35 minutes discharging the patient  This time was spent on the day of discharge  I had direct contact with the patient on the day of discharge  Greater than 50% of the total time was spent examining patient, answering all patient questions, arranging and discussing plan of care with patient as well as directly providing post-discharge instructions  Additional time then spent on discharge activities  Discharge Medications:  See after visit summary for reconciled discharge medications provided to patient and family        ** Please Note: This note has been constructed using a voice recognition system **

## 2020-08-10 LAB
TSH RECEP AB SER-ACNC: <1.1 IU/L (ref 0–1.75)
TSI SER-ACNC: <0.1 IU/L (ref 0–0.55)

## 2020-09-08 ENCOUNTER — OFFICE VISIT (OUTPATIENT)
Dept: CARDIOLOGY CLINIC | Facility: CLINIC | Age: 55
End: 2020-09-08

## 2020-09-08 ENCOUNTER — DOCUMENTATION (OUTPATIENT)
Dept: CARDIOLOGY CLINIC | Facility: CLINIC | Age: 55
End: 2020-09-08

## 2020-09-08 VITALS
TEMPERATURE: 97.2 F | BODY MASS INDEX: 26.11 KG/M2 | WEIGHT: 197 LBS | OXYGEN SATURATION: 100 % | DIASTOLIC BLOOD PRESSURE: 74 MMHG | SYSTOLIC BLOOD PRESSURE: 118 MMHG | HEART RATE: 60 BPM | HEIGHT: 73 IN

## 2020-09-08 DIAGNOSIS — G25.81 RESTLESS LEG: ICD-10-CM

## 2020-09-08 DIAGNOSIS — I48.0 PAROXYSMAL ATRIAL FIBRILLATION (HCC): Primary | ICD-10-CM

## 2020-09-08 DIAGNOSIS — R79.89 ABNORMAL TSH: ICD-10-CM

## 2020-09-08 DIAGNOSIS — R77.8 ELEVATED TROPONIN: ICD-10-CM

## 2020-09-08 DIAGNOSIS — I50.22 CHRONIC SYSTOLIC CONGESTIVE HEART FAILURE (HCC): ICD-10-CM

## 2020-09-08 DIAGNOSIS — I43 TACHYCARDIA INDUCED CARDIOMYOPATHY (HCC): ICD-10-CM

## 2020-09-08 DIAGNOSIS — R00.0 TACHYCARDIA INDUCED CARDIOMYOPATHY (HCC): ICD-10-CM

## 2020-09-08 PROCEDURE — 99214 OFFICE O/P EST MOD 30 MIN: CPT | Performed by: INTERNAL MEDICINE

## 2020-09-08 RX ORDER — AMIODARONE HYDROCHLORIDE 200 MG/1
200 TABLET ORAL DAILY
Qty: 30 TABLET | Refills: 1 | Status: SHIPPED | OUTPATIENT
Start: 2020-09-08 | End: 2021-09-24

## 2020-09-08 RX ORDER — ROPINIROLE 0.5 MG/1
0.5 TABLET, FILM COATED ORAL
Qty: 30 TABLET | Refills: 11 | Status: ON HOLD | OUTPATIENT
Start: 2020-09-08 | End: 2021-09-27

## 2020-09-08 RX ORDER — ATORVASTATIN CALCIUM 40 MG/1
40 TABLET, FILM COATED ORAL
Qty: 30 TABLET | Refills: 11 | Status: ON HOLD | OUTPATIENT
Start: 2020-09-08 | End: 2021-09-27

## 2020-09-08 RX ORDER — MAGNESIUM OXIDE 400 MG/1
400 TABLET ORAL
COMMUNITY
Start: 2020-08-08 | End: 2020-09-08 | Stop reason: CLARIF

## 2020-09-08 RX ORDER — METOPROLOL SUCCINATE 50 MG/1
50 TABLET, EXTENDED RELEASE ORAL 2 TIMES DAILY
Qty: 60 TABLET | Refills: 11 | Status: ON HOLD | OUTPATIENT
Start: 2020-09-08 | End: 2021-09-27

## 2020-09-08 NOTE — PATIENT INSTRUCTIONS
I would recommend decreasing amiodarone to 200 mg once daily  You will continue amiodarone for an additional 2 months and then discontinue  Eliquis ideally should be continued at 5 mg twice daily  I would recommend discontinuing aspirin as you do not have any evidence of underlying heart disease  I would recommend repeat echocardiogram in approximately 2 months for a total of 3 months from hospitalization  Please make sure that you call both Eliquis manufacture as well as insurance

## 2020-09-09 ENCOUNTER — DOCUMENTATION (OUTPATIENT)
Dept: CARDIOLOGY CLINIC | Facility: CLINIC | Age: 55
End: 2020-09-09

## 2020-09-09 PROBLEM — I48.0 PAROXYSMAL ATRIAL FIBRILLATION (HCC): Status: ACTIVE | Noted: 2020-08-04

## 2020-09-09 PROBLEM — I50.22 CHRONIC SYSTOLIC CONGESTIVE HEART FAILURE (HCC): Status: ACTIVE | Noted: 2020-08-04

## 2020-09-10 NOTE — ASSESSMENT & PLAN NOTE
Patient with prior history of tachycardia induced cardiomyopathy with prior EF of 20% that improved to 62% post cardioversion in 2018  EF 8/5/2020 25-30%  There could also be some component of ETOH induced cardiomyopathy  Continue cardiomyopathy specific metoprolol succinate  ACE-I/ARB held in hospital due to renal failure  Will plan repeat labs at follow up  Will add ACE-I/ARB if renal function improved

## 2020-09-10 NOTE — ASSESSMENT & PLAN NOTE
Wt Readings from Last 3 Encounters:   09/08/20 89 4 kg (197 lb)   08/08/20 86 6 kg (190 lb 14 7 oz)     Patient was noted to have heart failure during his hospitalization which was treated with IV furosemide at which time he had rapid improvement in symptoms  Patient was not discharged home on diuretic  Will plan BNP with next blood work

## 2020-09-10 NOTE — ASSESSMENT & PLAN NOTE
Patient admitted to [address] [place] [address] and noted to be in rapid atrial fibrillation  Prior history of atrial fibrillation in 2018 for which he underwent cardioversion 03/2018  Tachycardia induced cardiomyopathy noted at that time with an EF of 20% which subsequently improved after cardioversion to 62%  Patient underwent repeat KAYLYN guided cardioversion 08/06/2020 with successful conversion to normal sinus rhythm  Patient was started on metoprolol succinate 50 mg twice daily  He was supposed to be discharged home on 200 mg daily of amiodarone but it appears he was discharged home on 200 mg 3 times daily  Patient was also discharged home on Eliquis 5 mg twice daily although it is unclear how he will be able to pay for this as he has no insurance coverage  I have recommended decreasing amiodarone to 200 mg daily  Amiodarone will be continued for another 2 months and then discontinued  Continue other medications without change  I have asked him to follow up with his contact who is helping him apply for insurance as well as reach out to the Eliquis  to see if there is any programs he would qualify for to get the medication at a reduced cost   I would have a low threshold for EP evaluation for ablation if he has recurrent atrial fibrillation given his history of tachycardia induced cardiomyopathy  Patient does drink alcohol moderately  I discussed the need for alcohol abstinence  He should have eventual sleep study and nuclear stress test as well

## 2020-09-10 NOTE — ASSESSMENT & PLAN NOTE
Elevated troponin during hospitalization  Flat trend  Suspect secondary to demand in the setting of rapid ventricular rates  Prior cardiac catheterization in 2018 showed normal coronary arteries

## 2020-09-10 NOTE — PROGRESS NOTES
Cardiology Office Visit    Jesús Pond  [id number]  1965  [facility]  [address] [facility] [place] [address] [phone]      Dear Susie Oleary DO,    I had the pleasure of seeing your patient at our [address] [place] [address] office today 9/8/2020  As you know he is a pleasant 63y o  year old male with a medical history as described below  Reason for office visit: Follow up hospitalization for rapid atrial fibrillation s/p cardioversion  1  Paroxysmal atrial fibrillation [facility])  Assessment & Plan:  Patient admitted to [address] [place] [address] and noted to be in rapid atrial fibrillation  Prior history of atrial fibrillation in 2018 for which he underwent cardioversion 03/2018  Tachycardia induced cardiomyopathy noted at that time with an EF of 20% which subsequently improved after cardioversion to 62%  Patient underwent repeat KAYLYN guided cardioversion 08/06/2020 with successful conversion to normal sinus rhythm  Patient was started on metoprolol succinate 50 mg twice daily  He was supposed to be discharged home on 200 mg daily of amiodarone but it appears he was discharged home on 200 mg 3 times daily  Patient was also discharged home on Eliquis 5 mg twice daily although it is unclear how he will be able to pay for this as he has no insurance coverage  I have recommended decreasing amiodarone to 200 mg daily  Amiodarone will be continued for another 2 months and then discontinued  Continue other medications without change    I have asked him to follow up with his contact who is helping him apply for insurance as well as reach out to the Eliquis  to see if there is any programs he would qualify for to get the medication at a reduced cost   I would have a low threshold for EP evaluation for ablation if he has recurrent atrial fibrillation given his history of tachycardia induced cardiomyopathy  Patient does drink alcohol moderately  I discussed the need for alcohol abstinence  He should have eventual sleep study and nuclear stress test as well  Orders:  -     amiodarone 200 mg tablet; Take 1 tablet (200 mg total) by mouth daily  -     apixaban (ELIQUIS) 5 mg; Take 1 tablet (5 mg total) by mouth 2 (two) times a day  -     metoprolol succinate (TOPROL-XL) 50 mg 24 hr tablet; Take 1 tablet (50 mg total) by mouth 2 (two) times a day  -     magnesium oxide (MAG-OX) 400 mg; Take 1 tablet (400 mg total) by mouth 2 (two) times a day    2  Tachycardia induced cardiomyopathy ([facility] )  Assessment & Plan:  Patient with prior history of tachycardia induced cardiomyopathy with prior EF of 20% that improved to 62% post cardioversion in 2018  EF 8/5/2020 25-30%  There could also be some component of ETOH induced cardiomyopathy  Continue cardiomyopathy specific metoprolol succinate  ACE-I/ARB held in hospital due to renal failure  Will plan repeat labs at follow up  Will add ACE-I/ARB if renal function improved  Orders:  -     apixaban (ELIQUIS) 5 mg; Take 1 tablet (5 mg total) by mouth 2 (two) times a day  -     atorvastatin (LIPITOR) 40 mg tablet; Take 1 tablet (40 mg total) by mouth daily with dinner  -     metoprolol succinate (TOPROL-XL) 50 mg 24 hr tablet; Take 1 tablet (50 mg total) by mouth 2 (two) times a day    3  Chronic systolic congestive heart failure (HCC)  Assessment & Plan:  Wt Readings from Last 3 Encounters:   09/08/20 89 4 kg (197 lb)   08/08/20 86 6 kg (190 lb 14 7 oz)     Patient was noted to have heart failure during his hospitalization which was treated with IV furosemide at which time he had rapid improvement in symptoms  Patient was not discharged home on diuretic  Will plan BNP with next blood work  4  Elevated troponin  Assessment & Plan:  Elevated troponin during hospitalization  Flat trend    Suspect secondary to demand in the setting of rapid ventricular rates  Prior cardiac catheterization in 2018 showed normal coronary arteries  5  Abnormal TSH  Assessment & Plan:  TSH was low during hospitalization  Thyroid ultrasound done during hospitalization  Endocrinology recommended repeat TSH in 6 weeks  6  Restless leg  -     rOPINIRole (REQUIP) 0 5 mg tablet; Take 1 tablet (0 5 mg total) by mouth daily at bedtime           HPI   Patient presents for follow-up of atrial fibrillation as well as chronic systolic heart failure and presumed tachycardia induced cardiomyopathy  Patient presented 08/04/2020 after he presented to his primary provider's office after sustaining a fall approximately a week prior  Patient was noted to have a rapid irregular heartbeat on exam and was sent to the emergency room for further evaluation  Upon arrival to the emergency room patient was noted to be in rapid atrial fibrillation  Patient has a known history of atrial fibrillation and was previously on Eliquis anticoagulation as well as amiodarone, metoprolol and statin therapy for few months but then stop them due to loss of insurance  Troponin was initially noted to be 0 15  NT proBNP 2,786  The patient had atrial fibrillation in 2018 at which time he underwent cardioversion 03/2018 and was started on amiodarone  The patient was thought to have a tachycardia induced cardiomyopathy with an ejection fraction of 20%  He did undergo cardiac catheterization 02/22/2018 which showed normal coronary arteries  Repeat echocardiogram 08/2018 showed ejection fraction of 62%  Patient underwent KAYLYN guided cardioversion 08/06/2020 with successful conversion to normal sinus rhythm  Patient was started on metoprolol succinate 50 mg twice daily  He was re-loaded with amiodarone and discharged home on 200 mg daily  We discussed the need for alcohol cessation    We discussed there would be a low threshold to proceed with ablation given his recurrent heart failure in the setting of atrial fibrillation  We also discussed the need for an eventual sleep study  Unfortunately the patient does not have any insurance at this time  Echocardiogram 08/05/2020 showed ejection fraction of 25-30% but difficult to assess with underlying atrial fibrillation with rapid ventricular rates  Repeat echocardiogram was recommended in 3 months to re-evaluate LVEF and the need for ICD  Patient was noted to have elevated troponin with a flat trend during his hospitalization  Peak of 0 15  TSH was low  9/8/2020:  Patient presents to the office today for follow-up of atrial fibrillation  He denies any palpitations  He denies any chest pain  He denies any significant shortness of breath  He has been able to get his Eliquis anticoagulation but he has not heard back about his application for insurance  He is tolerating medications well              Patient Active Problem List   Diagnosis    Paroxysmal atrial fibrillation (HCC)    Elevated troponin    Chronic systolic congestive heart failure (HCC)    Cardiomyopathy (HCC)    Abnormal TSH    [facility]F [facility]congest[facility] failure) ([facility] )    CASEY (acute kidney injury) ([facility] )     Past Medical History:   Diagnosis Date    Atrial fibrillation ([facility] )     Hyperlipidemia     Overweight     Systolic heart failure (HCC)     Tachycardia induced cardiomyopathy ([facility] )      Social History     Socioeconomic History    Marital status: Single     Spouse name: Not on file    Number of children: Not on file    Years of education: Not on file    Highest education level: Not on file   Occupational History    Not on file   Social Needs    Financial resource strain: Hard    Food insecurity     Worry: Not on file     Inability: Not on file   [employer] needs     Medical: Not on file     Non-medical: Not on file   Tobacco Use    Smoking status: Former Smoker     Types: Cigarettes    Smokeless tobacco: Never Used   Substance and Sexual Activity    Alcohol use: Yes     Alcohol/week: 12 0 standard drinks     Types: 12 Standard drinks or equivalent per week     Frequency: 4 or more times a week     Drinks per session: 3 or 4     Binge frequency: Never     Comment: Mixed drinks several days a week    Drug use: Never    Sexual activity: Not on file     Comment: Defer   Lifestyle    Physical activity     Days per week: Not on file     Minutes per session: Not on file    Stress: Not on file   Relationships    Social connections     Talks on phone: Not on file     Gets together: Not on file     Attends Mandaeism service: Not on file     Active member of club or organization: Not on file     Attends meetings of clubs or organizations: Not on file     Relationship status: Not on file    Intimate partner violence     Fear of current or ex partner: Not on file     Emotionally abused: Not on file     Physically abused: Not on file     Forced sexual activity: Not on file   Other Topics Concern    Not on file   Social History Narrative    Not on file      Family History   Problem Relation Age of Onset    No Known Problems Mother     Cancer Father      Past Surgical History:   Procedure Laterality Date    CARDIAC CATHETERIZATION  02/22/2018    CARDIOVERSION  03/2018    CARDIOVERSION  08/06/2020    INGUINAL HERNIA REPAIR      SINUS SURGERY         Current Outpatient Medications:     amiodarone 200 mg tablet, Take 1 tablet (200 mg total) by mouth daily, Disp: 30 tablet, Rfl: 1    apixaban (ELIQUIS) 5 mg, Take 1 tablet (5 mg total) by mouth 2 (two) times a day, Disp: 60 tablet, Rfl: 11    atorvastatin (LIPITOR) 40 mg tablet, Take 1 tablet (40 mg total) by mouth daily with dinner, Disp: 30 tablet, Rfl: 11    magnesium oxide (MAG-OX) 400 mg, Take 1 tablet (400 mg total) by mouth 2 (two) times a day, Disp: 60 tablet, Rfl: 11    metoprolol succinate (TOPROL-XL) 50 mg 24 hr tablet, Take 1 tablet (50 mg total) by mouth 2 (two) times a day, Disp: 60 tablet, Rfl: 11   rOPINIRole (REQUIP) 0 5 mg tablet, Take 1 tablet (0 5 mg total) by mouth daily at bedtime, Disp: 30 tablet, Rfl: 11  No Known Allergies      Labs:  Lab Results   Component Value Date    K 4 7 08/08/2020     08/08/2020    CO2 28 08/08/2020    BUN 15 08/08/2020    CREATININE 1 38 (H) 08/08/2020    CALCIUM 9 3 08/08/2020     Lab Results   Component Value Date    TROPONINI 0 13 (H) 08/04/2020     Lab Results   Component Value Date    WBC 11 92 (H) 08/07/2020    HGB 16 7 08/07/2020    HCT 51 7 (H) 08/07/2020    MCV 87 08/07/2020     08/07/2020     Lab Results   Component Value Date    TRIG 111 08/04/2020    HDL 40 08/04/2020      Lab Results   Component Value Date    LDLCALC 89 08/04/2020     Echocardiogram 8/5/2020:  Left ventricle is mildly dilated  EF 25-30%  Diffuse hypokinesis  Dilated right ventricle with reduced right ventricular systolic function  Moderately dilated left atrium  Moderately dilated right atrium  Moderate mitral regurgitation  Mild-to-moderate tricuspid regurgitation  Estimated PASP 35 mmHg  Review of Systems:    Review of Systems   Constitutional: Negative for activity change, appetite change and fatigue  HENT: Negative for congestion, hearing loss, tinnitus and trouble swallowing  Eyes: Negative for visual disturbance  Respiratory: Negative for cough, chest tightness, shortness of breath and wheezing  Cardiovascular: Negative for chest pain, palpitations and leg swelling  Gastrointestinal: Negative for abdominal distention, abdominal pain, nausea and vomiting  Genitourinary: Negative for difficulty urinating  Musculoskeletal: Negative for arthralgias  Skin: Negative for rash  Neurological: Negative for dizziness, syncope and light-headedness  Hematological: Does not bruise/bleed easily  Psychiatric/Behavioral: Negative for confusion  The patient is not nervous/anxious  All other systems reviewed and are negative          Vitals:    09/08/20 1450 BP: 118/74   BP Location: Left arm   Patient Position: Sitting   Cuff Size: Standard   Pulse: 60   Temp: (!) 97 2 °F (36 2 °C)   SpO2: 100%   Weight: 89 4 kg (197 lb)   Height: 6' 1" (1 854 m)     Vitals:    09/08/20 1450   Weight: 89 4 kg (197 lb)     Height: 6' 1" (185 4 cm)     Physical Exam   Constitutional: He is oriented to person, place, and time  He appears well-developed and well-nourished  HENT:   Head: Normocephalic and atraumatic  Eyes: Pupils are equal, round, and reactive to light  Conjunctivae are normal    Neck: Normal range of motion  No JVD present  Cardiovascular: Normal rate, regular rhythm and normal heart sounds  Exam reveals no gallop and no friction rub  No murmur heard  Pulmonary/Chest: Effort normal and breath sounds normal    Abdominal: Soft  Bowel sounds are normal    Musculoskeletal:         General: No edema  Neurological: He is alert and oriented to person, place, and time  Skin: Skin is warm and dry  Psychiatric: He has a normal mood and affect  His behavior is normal    Vitals reviewed

## 2020-09-10 NOTE — ASSESSMENT & PLAN NOTE
TSH was low during hospitalization  Thyroid ultrasound done during hospitalization  Endocrinology recommended repeat TSH in 6 weeks

## 2021-01-02 DIAGNOSIS — I48.0 PAROXYSMAL ATRIAL FIBRILLATION (HCC): ICD-10-CM

## 2021-01-04 ENCOUNTER — TELEPHONE (OUTPATIENT)
Dept: CARDIOLOGY CLINIC | Facility: CLINIC | Age: 56
End: 2021-01-04

## 2021-01-04 RX ORDER — AMIODARONE HYDROCHLORIDE 200 MG/1
TABLET ORAL
Qty: 30 TABLET | Refills: 0 | OUTPATIENT
Start: 2021-01-04

## 2021-01-04 NOTE — TELEPHONE ENCOUNTER
Can you please make sure that this patient has follow-up in the office  Also I did get a request for refill of amiodarone  I had recommended that amiodarone be discontinued after approximately 2 months from his last visit  Can you please call the patient and let him know that when he finishes his current script of amiodarone it should be discontinued (he is too young to continue this medication long term)  He needs to be seen for follow up and I do not see anything scheduled  He was a no show for his last appointment  Thank you

## 2021-09-24 ENCOUNTER — HOSPITAL ENCOUNTER (INPATIENT)
Facility: HOSPITAL | Age: 56
LOS: 6 days | Discharge: HOME | DRG: 309 | End: 2021-09-30
Attending: STUDENT IN AN ORGANIZED HEALTH CARE EDUCATION/TRAINING PROGRAM | Admitting: FAMILY MEDICINE

## 2021-09-24 ENCOUNTER — APPOINTMENT (EMERGENCY)
Dept: RADIOLOGY | Facility: HOSPITAL | Age: 56
DRG: 309 | End: 2021-09-24

## 2021-09-24 DIAGNOSIS — I48.91 ATRIAL FIBRILLATION WITH RVR (HCC): Primary | ICD-10-CM

## 2021-09-24 DIAGNOSIS — I51.3 LEFT ATRIAL THROMBUS: ICD-10-CM

## 2021-09-24 PROBLEM — I42.6 ALCOHOLIC CARDIOMYOPATHY (HCC): Status: ACTIVE | Noted: 2021-09-24

## 2021-09-24 PROBLEM — N18.32 STAGE 3B CHRONIC KIDNEY DISEASE (HCC): Status: ACTIVE | Noted: 2020-08-07

## 2021-09-24 PROBLEM — I10 BENIGN ESSENTIAL HTN: Status: ACTIVE | Noted: 2021-09-24

## 2021-09-24 LAB
ANION GAP SERPL CALCULATED.3IONS-SCNC: 12 MMOL/L (ref 4–13)
BASOPHILS # BLD AUTO: 0.07 THOUSANDS/ΜL (ref 0–0.1)
BASOPHILS NFR BLD AUTO: 1 % (ref 0–1)
BUN SERPL-MCNC: 16 MG/DL (ref 5–25)
CALCIUM SERPL-MCNC: 9.2 MG/DL (ref 8.3–10.1)
CHLORIDE SERPL-SCNC: 106 MMOL/L (ref 100–108)
CO2 SERPL-SCNC: 25 MMOL/L (ref 21–32)
CREAT SERPL-MCNC: 1.43 MG/DL (ref 0.6–1.3)
EOSINOPHIL # BLD AUTO: 0.37 THOUSAND/ΜL (ref 0–0.61)
EOSINOPHIL NFR BLD AUTO: 4 % (ref 0–6)
ERYTHROCYTE [DISTWIDTH] IN BLOOD BY AUTOMATED COUNT: 12.7 % (ref 11.6–15.1)
GFR SERPL CREATININE-BSD FRML MDRD: 54 ML/MIN/1.73SQ M
GLUCOSE SERPL-MCNC: 117 MG/DL (ref 65–140)
HCT VFR BLD AUTO: 47 % (ref 36.5–49.3)
HGB BLD-MCNC: 15 G/DL (ref 12–17)
IMM GRANULOCYTES # BLD AUTO: 0.04 THOUSAND/UL (ref 0–0.2)
IMM GRANULOCYTES NFR BLD AUTO: 0 % (ref 0–2)
LYMPHOCYTES # BLD AUTO: 3.01 THOUSANDS/ΜL (ref 0.6–4.47)
LYMPHOCYTES NFR BLD AUTO: 30 % (ref 14–44)
MAGNESIUM SERPL-MCNC: 2 MG/DL (ref 1.6–2.6)
MCH RBC QN AUTO: 30.2 PG (ref 26.8–34.3)
MCHC RBC AUTO-ENTMCNC: 31.9 G/DL (ref 31.4–37.4)
MCV RBC AUTO: 95 FL (ref 82–98)
MONOCYTES # BLD AUTO: 0.82 THOUSAND/ΜL (ref 0.17–1.22)
MONOCYTES NFR BLD AUTO: 8 % (ref 4–12)
NEUTROPHILS # BLD AUTO: 5.78 THOUSANDS/ΜL (ref 1.85–7.62)
NEUTS SEG NFR BLD AUTO: 57 % (ref 43–75)
NRBC BLD AUTO-RTO: 0 /100 WBCS
NT-PROBNP SERPL-MCNC: 2763 PG/ML
PHOSPHATE SERPL-MCNC: 3.1 MG/DL (ref 2.7–4.5)
PLATELET # BLD AUTO: 329 THOUSANDS/UL (ref 149–390)
PMV BLD AUTO: 10.7 FL (ref 8.9–12.7)
POTASSIUM SERPL-SCNC: 3.5 MMOL/L (ref 3.5–5.3)
QRS AXIS: 116 DEGREES
QRS AXIS: 63 DEGREES
QRSD INTERVAL: 86 MS
QRSD INTERVAL: 88 MS
QT INTERVAL: 238 MS
QT INTERVAL: 292 MS
QTC INTERVAL: 424 MS
QTC INTERVAL: 455 MS
RBC # BLD AUTO: 4.96 MILLION/UL (ref 3.88–5.62)
SODIUM SERPL-SCNC: 143 MMOL/L (ref 136–145)
T WAVE AXIS: -38 DEGREES
T WAVE AXIS: 85 DEGREES
TROPONIN I SERPL-MCNC: 0.02 NG/ML
TSH SERPL DL<=0.05 MIU/L-ACNC: 3.55 UIU/ML (ref 0.36–3.74)
VENTRICULAR RATE: 146 BPM
VENTRICULAR RATE: 191 BPM
WBC # BLD AUTO: 10.09 THOUSAND/UL (ref 4.31–10.16)

## 2021-09-24 PROCEDURE — 96361 HYDRATE IV INFUSION ADD-ON: CPT

## 2021-09-24 PROCEDURE — 83735 ASSAY OF MAGNESIUM: CPT | Performed by: STUDENT IN AN ORGANIZED HEALTH CARE EDUCATION/TRAINING PROGRAM

## 2021-09-24 PROCEDURE — 84100 ASSAY OF PHOSPHORUS: CPT | Performed by: STUDENT IN AN ORGANIZED HEALTH CARE EDUCATION/TRAINING PROGRAM

## 2021-09-24 PROCEDURE — 36415 COLL VENOUS BLD VENIPUNCTURE: CPT | Performed by: STUDENT IN AN ORGANIZED HEALTH CARE EDUCATION/TRAINING PROGRAM

## 2021-09-24 PROCEDURE — 83880 ASSAY OF NATRIURETIC PEPTIDE: CPT | Performed by: STUDENT IN AN ORGANIZED HEALTH CARE EDUCATION/TRAINING PROGRAM

## 2021-09-24 PROCEDURE — 84443 ASSAY THYROID STIM HORMONE: CPT | Performed by: STUDENT IN AN ORGANIZED HEALTH CARE EDUCATION/TRAINING PROGRAM

## 2021-09-24 PROCEDURE — 71045 X-RAY EXAM CHEST 1 VIEW: CPT

## 2021-09-24 PROCEDURE — 99291 CRITICAL CARE FIRST HOUR: CPT

## 2021-09-24 PROCEDURE — 85025 COMPLETE CBC W/AUTO DIFF WBC: CPT | Performed by: STUDENT IN AN ORGANIZED HEALTH CARE EDUCATION/TRAINING PROGRAM

## 2021-09-24 PROCEDURE — 80048 BASIC METABOLIC PNL TOTAL CA: CPT | Performed by: STUDENT IN AN ORGANIZED HEALTH CARE EDUCATION/TRAINING PROGRAM

## 2021-09-24 PROCEDURE — 99291 CRITICAL CARE FIRST HOUR: CPT | Performed by: STUDENT IN AN ORGANIZED HEALTH CARE EDUCATION/TRAINING PROGRAM

## 2021-09-24 PROCEDURE — 84484 ASSAY OF TROPONIN QUANT: CPT | Performed by: STUDENT IN AN ORGANIZED HEALTH CARE EDUCATION/TRAINING PROGRAM

## 2021-09-24 PROCEDURE — 96376 TX/PRO/DX INJ SAME DRUG ADON: CPT

## 2021-09-24 PROCEDURE — 93005 ELECTROCARDIOGRAM TRACING: CPT

## 2021-09-24 PROCEDURE — 99223 1ST HOSP IP/OBS HIGH 75: CPT | Performed by: FAMILY MEDICINE

## 2021-09-24 PROCEDURE — 96375 TX/PRO/DX INJ NEW DRUG ADDON: CPT

## 2021-09-24 PROCEDURE — 96365 THER/PROPH/DIAG IV INF INIT: CPT

## 2021-09-24 RX ORDER — CALCIUM CARBONATE 200(500)MG
1000 TABLET,CHEWABLE ORAL DAILY PRN
Status: DISCONTINUED | OUTPATIENT
Start: 2021-09-24 | End: 2021-09-30 | Stop reason: HOSPADM

## 2021-09-24 RX ORDER — ROPINIROLE 0.25 MG/1
0.5 TABLET, FILM COATED ORAL
Status: DISCONTINUED | OUTPATIENT
Start: 2021-09-24 | End: 2021-09-30 | Stop reason: HOSPADM

## 2021-09-24 RX ORDER — METOPROLOL TARTRATE 5 MG/5ML
5 INJECTION INTRAVENOUS ONCE
Status: COMPLETED | OUTPATIENT
Start: 2021-09-24 | End: 2021-09-24

## 2021-09-24 RX ORDER — ONDANSETRON 2 MG/ML
4 INJECTION INTRAMUSCULAR; INTRAVENOUS EVERY 6 HOURS PRN
Status: DISCONTINUED | OUTPATIENT
Start: 2021-09-24 | End: 2021-09-30 | Stop reason: HOSPADM

## 2021-09-24 RX ORDER — DILTIAZEM HYDROCHLORIDE 5 MG/ML
20 INJECTION INTRAVENOUS ONCE
Status: COMPLETED | OUTPATIENT
Start: 2021-09-24 | End: 2021-09-24

## 2021-09-24 RX ORDER — ACETAMINOPHEN 325 MG/1
650 TABLET ORAL EVERY 6 HOURS PRN
Status: DISCONTINUED | OUTPATIENT
Start: 2021-09-24 | End: 2021-09-30 | Stop reason: HOSPADM

## 2021-09-24 RX ORDER — ATORVASTATIN CALCIUM 40 MG/1
40 TABLET, FILM COATED ORAL
Status: DISCONTINUED | OUTPATIENT
Start: 2021-09-24 | End: 2021-09-30 | Stop reason: HOSPADM

## 2021-09-24 RX ORDER — ASPIRIN 81 MG/1
81 TABLET, CHEWABLE ORAL DAILY
Status: DISCONTINUED | OUTPATIENT
Start: 2021-09-25 | End: 2021-09-28

## 2021-09-24 RX ORDER — METOPROLOL SUCCINATE 50 MG/1
50 TABLET, EXTENDED RELEASE ORAL 2 TIMES DAILY
Status: DISCONTINUED | OUTPATIENT
Start: 2021-09-24 | End: 2021-09-25

## 2021-09-24 RX ORDER — ASPIRIN 81 MG/1
81 TABLET, CHEWABLE ORAL DAILY
COMMUNITY
End: 2021-10-18 | Stop reason: ALTCHOICE

## 2021-09-24 RX ADMIN — ATORVASTATIN CALCIUM 40 MG: 40 TABLET, FILM COATED ORAL at 18:19

## 2021-09-24 RX ADMIN — SODIUM CHLORIDE 1000 ML: 0.9 INJECTION, SOLUTION INTRAVENOUS at 13:51

## 2021-09-24 RX ADMIN — ROPINIROLE HYDROCHLORIDE 0.5 MG: 0.25 TABLET, FILM COATED ORAL at 21:57

## 2021-09-24 RX ADMIN — METOROPROLOL TARTRATE 5 MG: 5 INJECTION, SOLUTION INTRAVENOUS at 13:51

## 2021-09-24 RX ADMIN — METOROPROLOL TARTRATE 5 MG: 5 INJECTION, SOLUTION INTRAVENOUS at 13:44

## 2021-09-24 RX ADMIN — DILTIAZEM HYDROCHLORIDE 2.5 MG/HR: 5 INJECTION INTRAVENOUS at 15:06

## 2021-09-24 RX ADMIN — Medication 400 MG: at 18:19

## 2021-09-24 RX ADMIN — DILTIAZEM HYDROCHLORIDE 20 MG: 5 INJECTION INTRAVENOUS at 14:54

## 2021-09-24 RX ADMIN — METOPROLOL SUCCINATE 50 MG: 50 TABLET, EXTENDED RELEASE ORAL at 18:20

## 2021-09-24 RX ADMIN — METOROPROLOL TARTRATE 5 MG: 5 INJECTION, SOLUTION INTRAVENOUS at 14:24

## 2021-09-24 RX ADMIN — ENOXAPARIN SODIUM 90 MG: 100 INJECTION SUBCUTANEOUS at 18:20

## 2021-09-24 RX ADMIN — ACETAMINOPHEN 650 MG: 325 TABLET ORAL at 21:59

## 2021-09-25 LAB
ANION GAP SERPL CALCULATED.3IONS-SCNC: 9 MMOL/L (ref 4–13)
BUN SERPL-MCNC: 17 MG/DL (ref 5–25)
CALCIUM SERPL-MCNC: 8.3 MG/DL (ref 8.3–10.1)
CHLORIDE SERPL-SCNC: 104 MMOL/L (ref 100–108)
CO2 SERPL-SCNC: 26 MMOL/L (ref 21–32)
CREAT SERPL-MCNC: 1.22 MG/DL (ref 0.6–1.3)
ERYTHROCYTE [DISTWIDTH] IN BLOOD BY AUTOMATED COUNT: 12.8 % (ref 11.6–15.1)
GFR SERPL CREATININE-BSD FRML MDRD: 65 ML/MIN/1.73SQ M
GLUCOSE SERPL-MCNC: 102 MG/DL (ref 65–140)
HCT VFR BLD AUTO: 44.4 % (ref 36.5–49.3)
HGB BLD-MCNC: 13.9 G/DL (ref 12–17)
MCH RBC QN AUTO: 29.8 PG (ref 26.8–34.3)
MCHC RBC AUTO-ENTMCNC: 31.3 G/DL (ref 31.4–37.4)
MCV RBC AUTO: 95 FL (ref 82–98)
PLATELET # BLD AUTO: 299 THOUSANDS/UL (ref 149–390)
PMV BLD AUTO: 10.5 FL (ref 8.9–12.7)
POTASSIUM SERPL-SCNC: 4.1 MMOL/L (ref 3.5–5.3)
RBC # BLD AUTO: 4.66 MILLION/UL (ref 3.88–5.62)
SODIUM SERPL-SCNC: 139 MMOL/L (ref 136–145)
TSH SERPL DL<=0.05 MIU/L-ACNC: 2.32 UIU/ML (ref 0.36–3.74)
WBC # BLD AUTO: 9.7 THOUSAND/UL (ref 4.31–10.16)

## 2021-09-25 PROCEDURE — 99232 SBSQ HOSP IP/OBS MODERATE 35: CPT | Performed by: FAMILY MEDICINE

## 2021-09-25 PROCEDURE — 84443 ASSAY THYROID STIM HORMONE: CPT | Performed by: FAMILY MEDICINE

## 2021-09-25 PROCEDURE — 80048 BASIC METABOLIC PNL TOTAL CA: CPT | Performed by: FAMILY MEDICINE

## 2021-09-25 PROCEDURE — 85027 COMPLETE CBC AUTOMATED: CPT | Performed by: FAMILY MEDICINE

## 2021-09-25 RX ORDER — DILTIAZEM HYDROCHLORIDE 180 MG/1
180 CAPSULE, COATED, EXTENDED RELEASE ORAL DAILY
Status: DISCONTINUED | OUTPATIENT
Start: 2021-09-26 | End: 2021-09-26

## 2021-09-25 RX ORDER — DILTIAZEM HYDROCHLORIDE 5 MG/ML
15 INJECTION INTRAVENOUS ONCE
Status: COMPLETED | OUTPATIENT
Start: 2021-09-25 | End: 2021-09-25

## 2021-09-25 RX ADMIN — DILTIAZEM HYDROCHLORIDE 30 MG: 30 TABLET, FILM COATED ORAL at 23:06

## 2021-09-25 RX ADMIN — APIXABAN 5 MG: 5 TABLET, FILM COATED ORAL at 17:17

## 2021-09-25 RX ADMIN — Medication 400 MG: at 17:15

## 2021-09-25 RX ADMIN — Medication 400 MG: at 08:31

## 2021-09-25 RX ADMIN — ENOXAPARIN SODIUM 90 MG: 100 INJECTION SUBCUTANEOUS at 08:30

## 2021-09-25 RX ADMIN — ASPIRIN 81 MG: 81 TABLET, CHEWABLE ORAL at 08:31

## 2021-09-25 RX ADMIN — DILTIAZEM HYDROCHLORIDE 15 MG: 5 INJECTION INTRAVENOUS at 19:04

## 2021-09-25 RX ADMIN — DILTIAZEM HYDROCHLORIDE 15 MG: 5 INJECTION INTRAVENOUS at 08:31

## 2021-09-25 RX ADMIN — METOPROLOL SUCCINATE 50 MG: 50 TABLET, EXTENDED RELEASE ORAL at 08:31

## 2021-09-25 RX ADMIN — METOPROLOL SUCCINATE 75 MG: 50 TABLET, EXTENDED RELEASE ORAL at 17:16

## 2021-09-25 RX ADMIN — DILTIAZEM HYDROCHLORIDE 30 MG: 30 TABLET, FILM COATED ORAL at 11:06

## 2021-09-25 RX ADMIN — DILTIAZEM HYDROCHLORIDE 30 MG: 30 TABLET, FILM COATED ORAL at 08:31

## 2021-09-25 RX ADMIN — APIXABAN 5 MG: 5 TABLET, FILM COATED ORAL at 11:06

## 2021-09-25 RX ADMIN — DILTIAZEM HYDROCHLORIDE 30 MG: 30 TABLET, FILM COATED ORAL at 17:15

## 2021-09-25 RX ADMIN — ATORVASTATIN CALCIUM 40 MG: 40 TABLET, FILM COATED ORAL at 17:16

## 2021-09-25 RX ADMIN — ROPINIROLE HYDROCHLORIDE 0.5 MG: 0.25 TABLET, FILM COATED ORAL at 22:26

## 2021-09-26 PROCEDURE — 99233 SBSQ HOSP IP/OBS HIGH 50: CPT | Performed by: FAMILY MEDICINE

## 2021-09-26 RX ORDER — DILTIAZEM HYDROCHLORIDE 240 MG/1
240 CAPSULE, COATED, EXTENDED RELEASE ORAL DAILY
Status: DISCONTINUED | OUTPATIENT
Start: 2021-09-27 | End: 2021-09-30 | Stop reason: HOSPADM

## 2021-09-26 RX ORDER — DILTIAZEM HYDROCHLORIDE 5 MG/ML
10 INJECTION INTRAVENOUS 3 TIMES DAILY PRN
Status: DISCONTINUED | OUTPATIENT
Start: 2021-09-26 | End: 2021-09-30 | Stop reason: HOSPADM

## 2021-09-26 RX ADMIN — ASPIRIN 81 MG: 81 TABLET, CHEWABLE ORAL at 08:53

## 2021-09-26 RX ADMIN — ATORVASTATIN CALCIUM 40 MG: 40 TABLET, FILM COATED ORAL at 17:16

## 2021-09-26 RX ADMIN — METOPROLOL SUCCINATE 75 MG: 50 TABLET, EXTENDED RELEASE ORAL at 17:16

## 2021-09-26 RX ADMIN — APIXABAN 5 MG: 5 TABLET, FILM COATED ORAL at 17:16

## 2021-09-26 RX ADMIN — METOPROLOL SUCCINATE 75 MG: 50 TABLET, EXTENDED RELEASE ORAL at 08:53

## 2021-09-26 RX ADMIN — APIXABAN 5 MG: 5 TABLET, FILM COATED ORAL at 08:53

## 2021-09-26 RX ADMIN — Medication 400 MG: at 17:16

## 2021-09-26 RX ADMIN — Medication 400 MG: at 08:53

## 2021-09-26 RX ADMIN — DILTIAZEM HYDROCHLORIDE 180 MG: 180 CAPSULE, COATED, EXTENDED RELEASE ORAL at 08:53

## 2021-09-26 RX ADMIN — ROPINIROLE HYDROCHLORIDE 0.5 MG: 0.25 TABLET, FILM COATED ORAL at 21:50

## 2021-09-27 ENCOUNTER — ANESTHESIA (OUTPATIENT)
Dept: ANESTHESIOLOGY | Facility: HOSPITAL | Age: 56
End: 2021-09-27

## 2021-09-27 ENCOUNTER — ANESTHESIA EVENT (OUTPATIENT)
Dept: ANESTHESIOLOGY | Facility: HOSPITAL | Age: 56
End: 2021-09-27

## 2021-09-27 PROCEDURE — 99232 SBSQ HOSP IP/OBS MODERATE 35: CPT | Performed by: FAMILY MEDICINE

## 2021-09-27 RX ADMIN — DILTIAZEM HYDROCHLORIDE 240 MG: 240 CAPSULE, COATED, EXTENDED RELEASE ORAL at 09:47

## 2021-09-27 RX ADMIN — Medication 400 MG: at 17:23

## 2021-09-27 RX ADMIN — ASPIRIN 81 MG: 81 TABLET, CHEWABLE ORAL at 09:47

## 2021-09-27 RX ADMIN — METOPROLOL SUCCINATE 75 MG: 50 TABLET, EXTENDED RELEASE ORAL at 09:48

## 2021-09-27 RX ADMIN — METOPROLOL SUCCINATE 75 MG: 50 TABLET, EXTENDED RELEASE ORAL at 17:23

## 2021-09-27 RX ADMIN — ROPINIROLE HYDROCHLORIDE 0.5 MG: 0.25 TABLET, FILM COATED ORAL at 21:31

## 2021-09-27 RX ADMIN — ATORVASTATIN CALCIUM 40 MG: 40 TABLET, FILM COATED ORAL at 17:23

## 2021-09-27 RX ADMIN — Medication 400 MG: at 09:47

## 2021-09-27 RX ADMIN — APIXABAN 5 MG: 5 TABLET, FILM COATED ORAL at 17:23

## 2021-09-27 RX ADMIN — APIXABAN 5 MG: 5 TABLET, FILM COATED ORAL at 09:47

## 2021-09-27 NOTE — ANESTHESIA PREPROCEDURE EVALUATION
Procedure:  PRE-OP ONLY  TTE: EF 25-30% with diffuse HK  RV dilated with mod reduced fx  Mod MR, Mod TR, EF did improve after cardioversion to 62%,    Afib RVR here for KAYLYN and cardioversion  Hx of LV thrombus is on eliquis  Relevant Problems   CARDIO   (+) Atrial fibrillation with RVR (HCC)   (+) Benign essential HTN   (+) CHF (congestive heart failure) (HCC)   (+) Chronic systolic congestive heart failure (HCC)   (+) Paroxysmal atrial fibrillation (HCC)      /RENAL   (+) Stage 3b chronic kidney disease (HCC)             Anesthesia Plan  ASA Score- 4     Anesthesia Type- IV sedation with anesthesia with ASA Monitors  Additional Monitors:   Airway Plan:           Plan Factors-    Chart reviewed  EKG reviewed  Existing labs reviewed  Patient summary reviewed              Induction-     Postoperative Plan-     Informed Consent-

## 2021-09-28 ENCOUNTER — ANESTHESIA EVENT (INPATIENT)
Dept: NON INVASIVE DIAGNOSTICS | Facility: HOSPITAL | Age: 56
DRG: 309 | End: 2021-09-28

## 2021-09-28 ENCOUNTER — APPOINTMENT (INPATIENT)
Dept: NON INVASIVE DIAGNOSTICS | Facility: HOSPITAL | Age: 56
DRG: 309 | End: 2021-09-28
Attending: INTERNAL MEDICINE

## 2021-09-28 ENCOUNTER — ANESTHESIA (INPATIENT)
Dept: NON INVASIVE DIAGNOSTICS | Facility: HOSPITAL | Age: 56
DRG: 309 | End: 2021-09-28

## 2021-09-28 PROBLEM — I51.3 LEFT ATRIAL THROMBUS: Status: ACTIVE | Noted: 2021-09-28

## 2021-09-28 PROCEDURE — 93005 ELECTROCARDIOGRAM TRACING: CPT

## 2021-09-28 PROCEDURE — 99233 SBSQ HOSP IP/OBS HIGH 50: CPT | Performed by: FAMILY MEDICINE

## 2021-09-28 PROCEDURE — 93312 ECHO TRANSESOPHAGEAL: CPT

## 2021-09-28 RX ORDER — MIDAZOLAM HYDROCHLORIDE 2 MG/2ML
INJECTION, SOLUTION INTRAMUSCULAR; INTRAVENOUS AS NEEDED
Status: DISCONTINUED | OUTPATIENT
Start: 2021-09-28 | End: 2021-09-28

## 2021-09-28 RX ORDER — SODIUM CHLORIDE, SODIUM LACTATE, POTASSIUM CHLORIDE, CALCIUM CHLORIDE 600; 310; 30; 20 MG/100ML; MG/100ML; MG/100ML; MG/100ML
INJECTION, SOLUTION INTRAVENOUS CONTINUOUS PRN
Status: DISCONTINUED | OUTPATIENT
Start: 2021-09-28 | End: 2021-09-28

## 2021-09-28 RX ORDER — KETAMINE HYDROCHLORIDE 50 MG/ML
INJECTION, SOLUTION, CONCENTRATE INTRAMUSCULAR; INTRAVENOUS AS NEEDED
Status: DISCONTINUED | OUTPATIENT
Start: 2021-09-28 | End: 2021-09-28

## 2021-09-28 RX ORDER — METOPROLOL SUCCINATE 100 MG/1
100 TABLET, EXTENDED RELEASE ORAL 2 TIMES DAILY
Status: DISCONTINUED | OUTPATIENT
Start: 2021-09-29 | End: 2021-09-30 | Stop reason: HOSPADM

## 2021-09-28 RX ORDER — METOPROLOL SUCCINATE 25 MG/1
25 TABLET, EXTENDED RELEASE ORAL ONCE
Status: COMPLETED | OUTPATIENT
Start: 2021-09-28 | End: 2021-09-28

## 2021-09-28 RX ORDER — PROPOFOL 10 MG/ML
INJECTION, EMULSION INTRAVENOUS AS NEEDED
Status: DISCONTINUED | OUTPATIENT
Start: 2021-09-28 | End: 2021-09-28

## 2021-09-28 RX ADMIN — METOPROLOL SUCCINATE 25 MG: 25 TABLET, EXTENDED RELEASE ORAL at 21:00

## 2021-09-28 RX ADMIN — KETAMINE HYDROCHLORIDE 10 MG: 50 INJECTION INTRAMUSCULAR; INTRAVENOUS at 11:21

## 2021-09-28 RX ADMIN — KETAMINE HYDROCHLORIDE 10 MG: 50 INJECTION INTRAMUSCULAR; INTRAVENOUS at 11:13

## 2021-09-28 RX ADMIN — PROPOFOL 10 MG: 10 INJECTION, EMULSION INTRAVENOUS at 11:13

## 2021-09-28 RX ADMIN — APIXABAN 5 MG: 5 TABLET, FILM COATED ORAL at 17:12

## 2021-09-28 RX ADMIN — MIDAZOLAM 2 MG: 1 INJECTION INTRAMUSCULAR; INTRAVENOUS at 11:01

## 2021-09-28 RX ADMIN — ROPINIROLE HYDROCHLORIDE 0.5 MG: 0.25 TABLET, FILM COATED ORAL at 21:00

## 2021-09-28 RX ADMIN — SODIUM CHLORIDE, SODIUM LACTATE, POTASSIUM CHLORIDE, AND CALCIUM CHLORIDE: .6; .31; .03; .02 INJECTION, SOLUTION INTRAVENOUS at 11:05

## 2021-09-28 RX ADMIN — METOPROLOL SUCCINATE 75 MG: 50 TABLET, EXTENDED RELEASE ORAL at 08:20

## 2021-09-28 RX ADMIN — KETAMINE HYDROCHLORIDE 10 MG: 50 INJECTION INTRAMUSCULAR; INTRAVENOUS at 11:17

## 2021-09-28 RX ADMIN — Medication 400 MG: at 08:20

## 2021-09-28 RX ADMIN — DILTIAZEM HYDROCHLORIDE 240 MG: 240 CAPSULE, COATED, EXTENDED RELEASE ORAL at 08:20

## 2021-09-28 RX ADMIN — METOPROLOL SUCCINATE 75 MG: 50 TABLET, EXTENDED RELEASE ORAL at 17:12

## 2021-09-28 RX ADMIN — APIXABAN 5 MG: 5 TABLET, FILM COATED ORAL at 08:20

## 2021-09-28 RX ADMIN — KETAMINE HYDROCHLORIDE 20 MG: 50 INJECTION INTRAMUSCULAR; INTRAVENOUS at 11:08

## 2021-09-28 RX ADMIN — PROPOFOL 10 MG: 10 INJECTION, EMULSION INTRAVENOUS at 11:17

## 2021-09-28 RX ADMIN — PROPOFOL 30 MG: 10 INJECTION, EMULSION INTRAVENOUS at 11:09

## 2021-09-28 RX ADMIN — Medication 400 MG: at 17:12

## 2021-09-28 RX ADMIN — ATORVASTATIN CALCIUM 40 MG: 40 TABLET, FILM COATED ORAL at 16:26

## 2021-09-28 RX ADMIN — ASPIRIN 81 MG: 81 TABLET, CHEWABLE ORAL at 08:20

## 2021-09-28 NOTE — ANESTHESIA PREPROCEDURE EVALUATION
Procedure:  KAYLYN W/ POSSIBLE CARDIOVERSION  TTE: EF 25-30% with diffuse HK  RV dilated with mod reduced fx  Mod MR, Mod TR, EF did improve after cardioversion to 62%,     Afib RVR here for KAYLYN and cardioversion  Hx of LV thrombus is on eliquis    Patient denies current CP SOB today  Did have SOB and severe fatigue while collecting garbage last week which prompted him to come into hospital  [place] tachycardia for 2 weeks prior to hospitalization    I performed bedside TTE: EF severely depressed about 25% with severe HK, RV mild dilated with mildly reduced fx  IVC <2 0 cm and 100% collapsible on deep inspirations   Relevant Problems   CARDIO   (+) Atrial fibrillation with RVR (HCC)   (+) Benign essential HTN   (+) CHF (congestive heart failure) (HCC)   (+) Chronic systolic congestive heart failure (HCC)   (+) Paroxysmal atrial fibrillation (HCC)      /RENAL   (+) Stage 3b chronic kidney disease (HCC)        Physical Exam    Airway    Mallampati score: II  TM Distance: >3 FB  Neck ROM: full     Dental       Cardiovascular      Pulmonary      Other Findings        Anesthesia Plan  ASA Score- 4 Emergent    Anesthesia Type- IV sedation with anesthesia with ASA Monitors  Additional Monitors:   Airway Plan:           Plan Factors-Exercise tolerance (METS): <4 METS  Chart reviewed  EKG reviewed  Existing labs reviewed  Patient summary reviewed  Patient is not a current smoker  Induction-     Postoperative Plan-     Informed Consent- Anesthetic plan and risks discussed with patient  I personally reviewed this patient with the CRNA  Discussed and agreed on the Anesthesia Plan with the CRNA  Elzbieta Benedict

## 2021-09-28 NOTE — ANESTHESIA POSTPROCEDURE EVALUATION
Post-Op Assessment Note    CV Status:  Stable  Pain Score: 0    Pain management: adequate  Multimodal analgesia used between 6 hours prior to anesthesia start to PACU discharge    Mental Status:  Somnolent   Hydration Status:  Euvolemic   PONV Controlled:  Controlled   Airway Patency:  Patent   Two or more mitigation strategies used for obstructive sleep apnea   Post Op Vitals Reviewed: Yes      Staff: CRNA         No complications documented      BP   130/80   Temp   97 8   Pulse  89   Resp   12   SpO2   97

## 2021-09-29 ENCOUNTER — TELEPHONE (OUTPATIENT)
Dept: CARDIOLOGY CLINIC | Facility: CLINIC | Age: 56
End: 2021-09-29

## 2021-09-29 PROBLEM — I42.9 IDIOPATHIC CARDIOMYOPATHY (HCC): Status: ACTIVE | Noted: 2021-09-24

## 2021-09-29 LAB
ANION GAP SERPL CALCULATED.3IONS-SCNC: 9 MMOL/L (ref 4–13)
BASOPHILS # BLD AUTO: 0.09 THOUSANDS/ΜL (ref 0–0.1)
BASOPHILS NFR BLD AUTO: 1 % (ref 0–1)
BUN SERPL-MCNC: 20 MG/DL (ref 5–25)
CALCIUM SERPL-MCNC: 8.7 MG/DL (ref 8.3–10.1)
CHLORIDE SERPL-SCNC: 103 MMOL/L (ref 100–108)
CO2 SERPL-SCNC: 24 MMOL/L (ref 21–32)
CREAT SERPL-MCNC: 1.29 MG/DL (ref 0.6–1.3)
EOSINOPHIL # BLD AUTO: 0.89 THOUSAND/ΜL (ref 0–0.61)
EOSINOPHIL NFR BLD AUTO: 9 % (ref 0–6)
ERYTHROCYTE [DISTWIDTH] IN BLOOD BY AUTOMATED COUNT: 12.3 % (ref 11.6–15.1)
GFR SERPL CREATININE-BSD FRML MDRD: 61 ML/MIN/1.73SQ M
GLUCOSE SERPL-MCNC: 114 MG/DL (ref 65–140)
HCT VFR BLD AUTO: 53.9 % (ref 36.5–49.3)
HGB BLD-MCNC: 17.8 G/DL (ref 12–17)
IMM GRANULOCYTES # BLD AUTO: 0.02 THOUSAND/UL (ref 0–0.2)
IMM GRANULOCYTES NFR BLD AUTO: 0 % (ref 0–2)
LYMPHOCYTES # BLD AUTO: 3.73 THOUSANDS/ΜL (ref 0.6–4.47)
LYMPHOCYTES NFR BLD AUTO: 39 % (ref 14–44)
MAGNESIUM SERPL-MCNC: 2.3 MG/DL (ref 1.6–2.6)
MCH RBC QN AUTO: 30.4 PG (ref 26.8–34.3)
MCHC RBC AUTO-ENTMCNC: 33 G/DL (ref 31.4–37.4)
MCV RBC AUTO: 92 FL (ref 82–98)
MONOCYTES # BLD AUTO: 1.02 THOUSAND/ΜL (ref 0.17–1.22)
MONOCYTES NFR BLD AUTO: 11 % (ref 4–12)
NEUTROPHILS # BLD AUTO: 3.86 THOUSANDS/ΜL (ref 1.85–7.62)
NEUTS SEG NFR BLD AUTO: 40 % (ref 43–75)
NRBC BLD AUTO-RTO: 0 /100 WBCS
PLATELET # BLD AUTO: 347 THOUSANDS/UL (ref 149–390)
PMV BLD AUTO: 10.1 FL (ref 8.9–12.7)
POTASSIUM SERPL-SCNC: 3.9 MMOL/L (ref 3.5–5.3)
RBC # BLD AUTO: 5.85 MILLION/UL (ref 3.88–5.62)
SODIUM SERPL-SCNC: 136 MMOL/L (ref 136–145)
WBC # BLD AUTO: 9.61 THOUSAND/UL (ref 4.31–10.16)

## 2021-09-29 PROCEDURE — 99232 SBSQ HOSP IP/OBS MODERATE 35: CPT | Performed by: INTERNAL MEDICINE

## 2021-09-29 PROCEDURE — 83735 ASSAY OF MAGNESIUM: CPT | Performed by: INTERNAL MEDICINE

## 2021-09-29 PROCEDURE — 85025 COMPLETE CBC W/AUTO DIFF WBC: CPT | Performed by: INTERNAL MEDICINE

## 2021-09-29 PROCEDURE — 93320 DOPPLER ECHO COMPLETE: CPT | Performed by: INTERNAL MEDICINE

## 2021-09-29 PROCEDURE — 93325 DOPPLER ECHO COLOR FLOW MAPG: CPT | Performed by: INTERNAL MEDICINE

## 2021-09-29 PROCEDURE — 80048 BASIC METABOLIC PNL TOTAL CA: CPT | Performed by: INTERNAL MEDICINE

## 2021-09-29 PROCEDURE — 93312 ECHO TRANSESOPHAGEAL: CPT | Performed by: INTERNAL MEDICINE

## 2021-09-29 PROCEDURE — 99232 SBSQ HOSP IP/OBS MODERATE 35: CPT | Performed by: FAMILY MEDICINE

## 2021-09-29 RX ORDER — DIGOXIN 125 MCG
250 TABLET ORAL ONCE
Status: COMPLETED | OUTPATIENT
Start: 2021-09-29 | End: 2021-09-29

## 2021-09-29 RX ADMIN — DIGOXIN 250 MCG: 125 TABLET ORAL at 10:04

## 2021-09-29 RX ADMIN — ROPINIROLE HYDROCHLORIDE 0.5 MG: 0.25 TABLET, FILM COATED ORAL at 20:42

## 2021-09-29 RX ADMIN — METOPROLOL SUCCINATE 100 MG: 100 TABLET, EXTENDED RELEASE ORAL at 17:10

## 2021-09-29 RX ADMIN — Medication 400 MG: at 09:12

## 2021-09-29 RX ADMIN — APIXABAN 5 MG: 5 TABLET, FILM COATED ORAL at 09:12

## 2021-09-29 RX ADMIN — METOPROLOL SUCCINATE 100 MG: 100 TABLET, EXTENDED RELEASE ORAL at 09:12

## 2021-09-29 RX ADMIN — DIGOXIN 250 MCG: 125 TABLET ORAL at 17:10

## 2021-09-29 RX ADMIN — ATORVASTATIN CALCIUM 40 MG: 40 TABLET, FILM COATED ORAL at 17:10

## 2021-09-29 RX ADMIN — APIXABAN 5 MG: 5 TABLET, FILM COATED ORAL at 17:11

## 2021-09-29 RX ADMIN — DILTIAZEM HYDROCHLORIDE 240 MG: 240 CAPSULE, COATED, EXTENDED RELEASE ORAL at 09:12

## 2021-09-29 RX ADMIN — Medication 400 MG: at 17:10

## 2021-09-30 VITALS
WEIGHT: 204 LBS | TEMPERATURE: 98.7 F | DIASTOLIC BLOOD PRESSURE: 79 MMHG | HEART RATE: 77 BPM | SYSTOLIC BLOOD PRESSURE: 120 MMHG | HEIGHT: 73 IN | BODY MASS INDEX: 27.04 KG/M2 | OXYGEN SATURATION: 95 % | RESPIRATION RATE: 18 BRPM

## 2021-09-30 LAB
ANION GAP SERPL CALCULATED.3IONS-SCNC: 9 MMOL/L (ref 4–13)
BUN SERPL-MCNC: 21 MG/DL (ref 5–25)
CALCIUM SERPL-MCNC: 9.1 MG/DL (ref 8.3–10.1)
CHLORIDE SERPL-SCNC: 102 MMOL/L (ref 100–108)
CO2 SERPL-SCNC: 26 MMOL/L (ref 21–32)
CREAT SERPL-MCNC: 1.34 MG/DL (ref 0.6–1.3)
GFR SERPL CREATININE-BSD FRML MDRD: 58 ML/MIN/1.73SQ M
GLUCOSE SERPL-MCNC: 99 MG/DL (ref 65–140)
MAGNESIUM SERPL-MCNC: 2.3 MG/DL (ref 1.6–2.6)
POTASSIUM SERPL-SCNC: 4.7 MMOL/L (ref 3.5–5.3)
SODIUM SERPL-SCNC: 137 MMOL/L (ref 136–145)

## 2021-09-30 PROCEDURE — 83735 ASSAY OF MAGNESIUM: CPT | Performed by: FAMILY MEDICINE

## 2021-09-30 PROCEDURE — 99232 SBSQ HOSP IP/OBS MODERATE 35: CPT | Performed by: INTERNAL MEDICINE

## 2021-09-30 PROCEDURE — 99239 HOSP IP/OBS DSCHRG MGMT >30: CPT | Performed by: FAMILY MEDICINE

## 2021-09-30 PROCEDURE — 80048 BASIC METABOLIC PNL TOTAL CA: CPT | Performed by: FAMILY MEDICINE

## 2021-09-30 RX ORDER — DILTIAZEM HYDROCHLORIDE 240 MG/1
240 CAPSULE, COATED, EXTENDED RELEASE ORAL DAILY
Qty: 90 CAPSULE | Refills: 2 | Status: SHIPPED | OUTPATIENT
Start: 2021-10-01 | End: 2021-11-18 | Stop reason: SDUPTHER

## 2021-09-30 RX ORDER — METOPROLOL SUCCINATE 100 MG/1
100 TABLET, EXTENDED RELEASE ORAL 2 TIMES DAILY
Qty: 60 TABLET | Refills: 3 | Status: SHIPPED | OUTPATIENT
Start: 2021-09-30 | End: 2021-10-18 | Stop reason: SDUPTHER

## 2021-09-30 RX ORDER — DIGOXIN 125 MCG
125 TABLET ORAL DAILY
Qty: 30 TABLET | Refills: 3 | Status: SHIPPED | OUTPATIENT
Start: 2021-09-30 | End: 2021-10-18 | Stop reason: SDUPTHER

## 2021-09-30 RX ADMIN — DILTIAZEM HYDROCHLORIDE 240 MG: 240 CAPSULE, COATED, EXTENDED RELEASE ORAL at 08:12

## 2021-09-30 RX ADMIN — METOPROLOL SUCCINATE 100 MG: 100 TABLET, EXTENDED RELEASE ORAL at 08:12

## 2021-09-30 RX ADMIN — APIXABAN 5 MG: 5 TABLET, FILM COATED ORAL at 08:12

## 2021-09-30 RX ADMIN — Medication 400 MG: at 08:12

## 2021-10-03 LAB
ATRIAL RATE: 187 BPM
QRS AXIS: 68 DEGREES
QRSD INTERVAL: 84 MS
QT INTERVAL: 378 MS
QTC INTERVAL: 472 MS
T WAVE AXIS: 81 DEGREES
VENTRICULAR RATE: 94 BPM

## 2021-10-03 PROCEDURE — 93010 ELECTROCARDIOGRAM REPORT: CPT | Performed by: INTERNAL MEDICINE

## 2021-10-18 ENCOUNTER — OFFICE VISIT (OUTPATIENT)
Dept: CARDIOLOGY CLINIC | Facility: CLINIC | Age: 56
End: 2021-10-18

## 2021-10-18 ENCOUNTER — TELEPHONE (OUTPATIENT)
Dept: CARDIOLOGY CLINIC | Facility: CLINIC | Age: 56
End: 2021-10-18

## 2021-10-18 VITALS
HEART RATE: 94 BPM | WEIGHT: 202 LBS | DIASTOLIC BLOOD PRESSURE: 82 MMHG | HEIGHT: 73 IN | SYSTOLIC BLOOD PRESSURE: 130 MMHG | OXYGEN SATURATION: 97 % | BODY MASS INDEX: 26.77 KG/M2

## 2021-10-18 DIAGNOSIS — I50.22 CHRONIC SYSTOLIC CONGESTIVE HEART FAILURE (HCC): ICD-10-CM

## 2021-10-18 DIAGNOSIS — I42.9 CARDIOMYOPATHY, UNSPECIFIED TYPE (HCC): ICD-10-CM

## 2021-10-18 DIAGNOSIS — I10 BENIGN ESSENTIAL HTN: ICD-10-CM

## 2021-10-18 DIAGNOSIS — G25.81 RESTLESS LEG: ICD-10-CM

## 2021-10-18 DIAGNOSIS — I48.0 PAROXYSMAL ATRIAL FIBRILLATION (HCC): Primary | ICD-10-CM

## 2021-10-18 DIAGNOSIS — N18.32 STAGE 3B CHRONIC KIDNEY DISEASE (HCC): ICD-10-CM

## 2021-10-18 DIAGNOSIS — I51.3 LEFT ATRIAL THROMBUS: ICD-10-CM

## 2021-10-18 DIAGNOSIS — E78.2 MIXED HYPERLIPIDEMIA: ICD-10-CM

## 2021-10-18 PROCEDURE — 99213 OFFICE O/P EST LOW 20 MIN: CPT | Performed by: NURSE PRACTITIONER

## 2021-10-18 RX ORDER — METOPROLOL SUCCINATE 100 MG/1
100 TABLET, EXTENDED RELEASE ORAL 2 TIMES DAILY
Qty: 60 TABLET | Refills: 11 | Status: SHIPPED | OUTPATIENT
Start: 2021-10-18 | End: 2021-11-18 | Stop reason: SDUPTHER

## 2021-10-18 RX ORDER — ROPINIROLE 0.5 MG/1
0.5 TABLET, FILM COATED ORAL
Qty: 30 TABLET | Refills: 11 | Status: SHIPPED | OUTPATIENT
Start: 2021-10-18 | End: 2021-11-18 | Stop reason: SDUPTHER

## 2021-10-18 RX ORDER — ATORVASTATIN CALCIUM 40 MG/1
40 TABLET, FILM COATED ORAL
Qty: 30 TABLET | Refills: 11 | Status: SHIPPED | OUTPATIENT
Start: 2021-10-18 | End: 2021-11-18 | Stop reason: SDUPTHER

## 2021-10-18 RX ORDER — DIGOXIN 125 MCG
125 TABLET ORAL DAILY
Qty: 30 TABLET | Refills: 11 | Status: SHIPPED | OUTPATIENT
Start: 2021-10-18 | End: 2021-11-18 | Stop reason: SDUPTHER

## 2021-10-24 PROBLEM — I42.9 IDIOPATHIC CARDIOMYOPATHY (HCC): Status: RESOLVED | Noted: 2021-09-24 | Resolved: 2021-10-24

## 2021-10-24 PROBLEM — I50.9 CHF (CONGESTIVE HEART FAILURE) (HCC): Status: RESOLVED | Noted: 2020-08-06 | Resolved: 2021-10-24

## 2021-10-24 PROBLEM — E78.2 MIXED HYPERLIPIDEMIA: Status: ACTIVE | Noted: 2021-10-24

## 2021-10-24 PROBLEM — I48.91 ATRIAL FIBRILLATION WITH RVR (HCC): Status: RESOLVED | Noted: 2021-09-24 | Resolved: 2021-10-24

## 2021-10-29 ENCOUNTER — TELEPHONE (OUTPATIENT)
Dept: CARDIOLOGY CLINIC | Facility: CLINIC | Age: 56
End: 2021-10-29

## 2021-11-09 ENCOUNTER — APPOINTMENT (OUTPATIENT)
Dept: LAB | Facility: HOSPITAL | Age: 56
End: 2021-11-09

## 2021-11-09 ENCOUNTER — TELEPHONE (OUTPATIENT)
Dept: NON INVASIVE DIAGNOSTICS | Facility: HOSPITAL | Age: 56
End: 2021-11-09

## 2021-11-09 DIAGNOSIS — I48.0 PAROXYSMAL ATRIAL FIBRILLATION (HCC): ICD-10-CM

## 2021-11-09 LAB
ANION GAP SERPL CALCULATED.3IONS-SCNC: 10 MMOL/L (ref 4–13)
BUN SERPL-MCNC: 20 MG/DL (ref 5–25)
CALCIUM SERPL-MCNC: 8.4 MG/DL (ref 8.3–10.1)
CHLORIDE SERPL-SCNC: 101 MMOL/L (ref 100–108)
CO2 SERPL-SCNC: 27 MMOL/L (ref 21–32)
CREAT SERPL-MCNC: 1.3 MG/DL (ref 0.6–1.3)
DIGOXIN SERPL-MCNC: 0.5 NG/ML (ref 0.8–2)
GFR SERPL CREATININE-BSD FRML MDRD: 61 ML/MIN/1.73SQ M
GLUCOSE SERPL-MCNC: 94 MG/DL (ref 65–140)
POTASSIUM SERPL-SCNC: 3.9 MMOL/L (ref 3.5–5.3)
SODIUM SERPL-SCNC: 138 MMOL/L (ref 136–145)

## 2021-11-09 PROCEDURE — 80048 BASIC METABOLIC PNL TOTAL CA: CPT

## 2021-11-09 PROCEDURE — 36415 COLL VENOUS BLD VENIPUNCTURE: CPT

## 2021-11-09 PROCEDURE — 80162 ASSAY OF DIGOXIN TOTAL: CPT | Performed by: NURSE PRACTITIONER

## 2021-11-10 ENCOUNTER — HOSPITAL ENCOUNTER (OUTPATIENT)
Dept: NON INVASIVE DIAGNOSTICS | Facility: HOSPITAL | Age: 56
Discharge: HOME | End: 2021-11-10
Payer: COMMERCIAL

## 2021-11-10 ENCOUNTER — ANESTHESIA (OUTPATIENT)
Dept: NON INVASIVE DIAGNOSTICS | Facility: HOSPITAL | Age: 56
End: 2021-11-10

## 2021-11-10 ENCOUNTER — TELEPHONE (OUTPATIENT)
Dept: CARDIOLOGY CLINIC | Facility: CLINIC | Age: 56
End: 2021-11-10

## 2021-11-10 ENCOUNTER — ANESTHESIA EVENT (OUTPATIENT)
Dept: NON INVASIVE DIAGNOSTICS | Facility: HOSPITAL | Age: 56
End: 2021-11-10

## 2021-11-10 VITALS
DIASTOLIC BLOOD PRESSURE: 70 MMHG | SYSTOLIC BLOOD PRESSURE: 111 MMHG | HEART RATE: 75 BPM | RESPIRATION RATE: 18 BRPM | WEIGHT: 202 LBS | HEIGHT: 71 IN | TEMPERATURE: 97.8 F | OXYGEN SATURATION: 96 % | BODY MASS INDEX: 28.28 KG/M2

## 2021-11-10 DIAGNOSIS — G25.81 RESTLESS LEG: ICD-10-CM

## 2021-11-10 DIAGNOSIS — I48.0 PAROXYSMAL ATRIAL FIBRILLATION (HCC): ICD-10-CM

## 2021-11-10 DIAGNOSIS — I48.91 ATRIAL FIBRILLATION WITH RVR (HCC): ICD-10-CM

## 2021-11-10 DIAGNOSIS — E78.2 MIXED HYPERLIPIDEMIA: ICD-10-CM

## 2021-11-10 PROBLEM — I42.6 ALCOHOLIC CARDIOMYOPATHY (HCC): Status: ACTIVE | Noted: 2021-11-10

## 2021-11-10 LAB — SL CV LV EF: 30

## 2021-11-10 PROCEDURE — 93320 DOPPLER ECHO COMPLETE: CPT | Performed by: INTERNAL MEDICINE

## 2021-11-10 PROCEDURE — 93005 ELECTROCARDIOGRAM TRACING: CPT

## 2021-11-10 PROCEDURE — 93325 DOPPLER ECHO COLOR FLOW MAPG: CPT | Performed by: INTERNAL MEDICINE

## 2021-11-10 PROCEDURE — 93312 ECHO TRANSESOPHAGEAL: CPT

## 2021-11-10 PROCEDURE — 93312 ECHO TRANSESOPHAGEAL: CPT | Performed by: INTERNAL MEDICINE

## 2021-11-10 RX ORDER — ALBUTEROL SULFATE 2.5 MG/3ML
2.5 SOLUTION RESPIRATORY (INHALATION) ONCE AS NEEDED
Status: DISCONTINUED | OUTPATIENT
Start: 2021-11-10 | End: 2021-11-11 | Stop reason: HOSPADM

## 2021-11-10 RX ORDER — KETAMINE HYDROCHLORIDE 50 MG/ML
INJECTION, SOLUTION, CONCENTRATE INTRAMUSCULAR; INTRAVENOUS AS NEEDED
Status: DISCONTINUED | OUTPATIENT
Start: 2021-11-10 | End: 2021-11-10

## 2021-11-10 RX ORDER — PROPOFOL 10 MG/ML
INJECTION, EMULSION INTRAVENOUS AS NEEDED
Status: DISCONTINUED | OUTPATIENT
Start: 2021-11-10 | End: 2021-11-10

## 2021-11-10 RX ORDER — ONDANSETRON 2 MG/ML
4 INJECTION INTRAMUSCULAR; INTRAVENOUS ONCE AS NEEDED
Status: DISCONTINUED | OUTPATIENT
Start: 2021-11-10 | End: 2021-11-11 | Stop reason: HOSPADM

## 2021-11-10 RX ORDER — SODIUM CHLORIDE, SODIUM LACTATE, POTASSIUM CHLORIDE, CALCIUM CHLORIDE 600; 310; 30; 20 MG/100ML; MG/100ML; MG/100ML; MG/100ML
INJECTION, SOLUTION INTRAVENOUS CONTINUOUS PRN
Status: DISCONTINUED | OUTPATIENT
Start: 2021-11-10 | End: 2021-11-10

## 2021-11-10 RX ORDER — CALCIUM CHLORIDE 100 MG/ML
INJECTION INTRAVENOUS; INTRAVENTRICULAR AS NEEDED
Status: DISCONTINUED | OUTPATIENT
Start: 2021-11-10 | End: 2021-11-10

## 2021-11-10 RX ORDER — FENTANYL CITRATE/PF 50 MCG/ML
25 SYRINGE (ML) INJECTION
Status: DISCONTINUED | OUTPATIENT
Start: 2021-11-10 | End: 2021-11-11 | Stop reason: HOSPADM

## 2021-11-10 RX ADMIN — KETAMINE HYDROCHLORIDE 10 MG: 50 INJECTION INTRAMUSCULAR; INTRAVENOUS at 08:19

## 2021-11-10 RX ADMIN — KETAMINE HYDROCHLORIDE 10 MG: 50 INJECTION INTRAMUSCULAR; INTRAVENOUS at 08:30

## 2021-11-10 RX ADMIN — CALCIUM CHLORIDE 0.25 G: 100 INJECTION PARENTERAL at 08:11

## 2021-11-10 RX ADMIN — KETAMINE HYDROCHLORIDE 30 MG: 50 INJECTION INTRAMUSCULAR; INTRAVENOUS at 08:11

## 2021-11-10 RX ADMIN — SODIUM CHLORIDE, SODIUM LACTATE, POTASSIUM CHLORIDE, AND CALCIUM CHLORIDE: .6; .31; .03; .02 INJECTION, SOLUTION INTRAVENOUS at 08:00

## 2021-11-10 RX ADMIN — PROPOFOL 50 MG: 10 INJECTION, EMULSION INTRAVENOUS at 08:11

## 2021-11-14 LAB
ATRIAL RATE: 288 BPM
QRS AXIS: 75 DEGREES
QRSD INTERVAL: 88 MS
QT INTERVAL: 390 MS
QTC INTERVAL: 429 MS
T WAVE AXIS: 82 DEGREES
VENTRICULAR RATE: 73 BPM

## 2021-11-14 PROCEDURE — 93010 ELECTROCARDIOGRAM REPORT: CPT | Performed by: INTERNAL MEDICINE

## 2021-11-18 RX ORDER — DIGOXIN 125 MCG
125 TABLET ORAL DAILY
Qty: 90 TABLET | Refills: 3 | Status: SHIPPED | OUTPATIENT
Start: 2021-11-18 | End: 2022-06-29

## 2021-11-18 RX ORDER — ATORVASTATIN CALCIUM 40 MG/1
40 TABLET, FILM COATED ORAL
Qty: 90 TABLET | Refills: 3 | Status: SHIPPED | OUTPATIENT
Start: 2021-11-18

## 2021-11-18 RX ORDER — DILTIAZEM HYDROCHLORIDE 240 MG/1
240 CAPSULE, COATED, EXTENDED RELEASE ORAL DAILY
Qty: 90 CAPSULE | Refills: 3 | Status: SHIPPED | OUTPATIENT
Start: 2021-11-18 | End: 2022-06-29

## 2021-11-18 RX ORDER — METOPROLOL SUCCINATE 100 MG/1
100 TABLET, EXTENDED RELEASE ORAL 2 TIMES DAILY
Qty: 180 TABLET | Refills: 3 | Status: SHIPPED | OUTPATIENT
Start: 2021-11-18

## 2021-11-18 RX ORDER — ROPINIROLE 0.5 MG/1
0.5 TABLET, FILM COATED ORAL
Qty: 90 TABLET | Refills: 3 | Status: SHIPPED | OUTPATIENT
Start: 2021-11-18

## 2021-11-30 ENCOUNTER — OFFICE VISIT (OUTPATIENT)
Dept: CARDIOLOGY CLINIC | Facility: CLINIC | Age: 56
End: 2021-11-30
Payer: COMMERCIAL

## 2021-11-30 DIAGNOSIS — I42.9 CARDIOMYOPATHY, UNSPECIFIED TYPE (HCC): ICD-10-CM

## 2021-11-30 DIAGNOSIS — N18.32 STAGE 3B CHRONIC KIDNEY DISEASE (HCC): ICD-10-CM

## 2021-11-30 DIAGNOSIS — I51.3 LEFT ATRIAL THROMBUS: ICD-10-CM

## 2021-11-30 DIAGNOSIS — I10 BENIGN ESSENTIAL HTN: ICD-10-CM

## 2021-11-30 DIAGNOSIS — E78.2 MIXED HYPERLIPIDEMIA: ICD-10-CM

## 2021-11-30 DIAGNOSIS — I48.0 PAROXYSMAL ATRIAL FIBRILLATION (HCC): Primary | ICD-10-CM

## 2021-11-30 DIAGNOSIS — I50.22 CHRONIC SYSTOLIC CONGESTIVE HEART FAILURE (HCC): ICD-10-CM

## 2021-11-30 PROCEDURE — 99213 OFFICE O/P EST LOW 20 MIN: CPT | Performed by: NURSE PRACTITIONER

## 2021-12-01 VITALS
BODY MASS INDEX: 27.7 KG/M2 | TEMPERATURE: 96.7 F | SYSTOLIC BLOOD PRESSURE: 118 MMHG | WEIGHT: 209 LBS | DIASTOLIC BLOOD PRESSURE: 70 MMHG | OXYGEN SATURATION: 97 % | RESPIRATION RATE: 14 BRPM | HEIGHT: 73 IN | HEART RATE: 101 BPM

## 2022-01-10 ENCOUNTER — OFFICE VISIT (OUTPATIENT)
Dept: CARDIOLOGY CLINIC | Facility: CLINIC | Age: 57
End: 2022-01-10
Payer: COMMERCIAL

## 2022-01-10 VITALS
DIASTOLIC BLOOD PRESSURE: 90 MMHG | HEIGHT: 73 IN | TEMPERATURE: 96 F | HEART RATE: 74 BPM | WEIGHT: 215.4 LBS | SYSTOLIC BLOOD PRESSURE: 136 MMHG | BODY MASS INDEX: 28.55 KG/M2 | OXYGEN SATURATION: 98 %

## 2022-01-10 DIAGNOSIS — I42.9 CARDIOMYOPATHY, UNSPECIFIED TYPE (HCC): ICD-10-CM

## 2022-01-10 DIAGNOSIS — I50.22 CHRONIC SYSTOLIC CONGESTIVE HEART FAILURE (HCC): ICD-10-CM

## 2022-01-10 DIAGNOSIS — N18.32 STAGE 3B CHRONIC KIDNEY DISEASE (HCC): ICD-10-CM

## 2022-01-10 DIAGNOSIS — I51.3 LEFT ATRIAL THROMBUS: ICD-10-CM

## 2022-01-10 DIAGNOSIS — E78.2 MIXED HYPERLIPIDEMIA: ICD-10-CM

## 2022-01-10 DIAGNOSIS — I10 BENIGN ESSENTIAL HTN: ICD-10-CM

## 2022-01-10 DIAGNOSIS — I48.0 PAROXYSMAL ATRIAL FIBRILLATION (HCC): Primary | ICD-10-CM

## 2022-01-10 PROCEDURE — 99214 OFFICE O/P EST MOD 30 MIN: CPT | Performed by: NURSE PRACTITIONER

## 2022-01-10 NOTE — LETTER
January 10, 2022     Patient: Samantha Zapata   YOB: 1965   Date of Visit: 1/10/2022       To Whom it May Concern:    Reina Sheppard is under my professional care  He was seen in my office on 1/10/2022  He may return to work on 1/11/2022  If you have any questions or concerns, please don't hesitate to call  Sincerely,          CHANTAL Burrell        CC: Jeanne Martins

## 2022-01-10 NOTE — PATIENT INSTRUCTIONS
KAYLYN/Cardioversion ordered, will attempt to schedule these today  It is very important not to miss any Eliquis doses with your upcoming cardioversion  Please notify me when you fill the Eliquis script  Keep me updated on cost   Please complete blood work at least 2 days before your procedure  Contact us immediately with any concerns  I have ordered your stress test and sleep study as you now have insurance

## 2022-01-10 NOTE — PROGRESS NOTES
Cardiology Follow Up    Lavell Welch  1965  [id number]  [facility] [place]  [address] [facility] [address]  [address]  [phone]    Rony Saunders presents today for follow up for atrial fibrillation and cardiomyopathy  1  Paroxysmal atrial fibrillation [facility])  Assessment & Plan:  History of atrial fibrillation in 2018 for which he underwent cardioversion 03/2018  Tachycardia induced cardiomyopathy noted at that time with an EF of 20% which subsequently improved after cardioversion to 62%  Patient underwent repeat KAYLYN guided cardioversion 08/06/2020 with successful conversion to normal sinus rhythm  Patient was started on metoprolol succinate 50 mg twice daily, Amiodarone, and Eliquis  Unfortunately he was lost to follow up secondary to lack of insurance  He presented back to [address] [place] [address] on 9/24/2021 in A fib with RVR  KAYLYN performed on 9/29 in preparation for cardioversion, however, left atrial appendage thrombus identified and cardioversion was aborted  Estimated EF during KAYLYN at 25-30%  He remained on Eliquis 5 mg BID uninterrupted and KAYLYN was performed 11/10/2021 with continued evidence of MOLLY thrombus  Cardioversion was aborted  Continue digoxin 0 125mg daily, diltiazem CD 240mg daily, and metoprolol succinate 100mg BID for rate control  Will orderTEE/Cardioversion again  I would have a low threshold for EP evaluation for ablation if he has recurrent atrial fibrillation given his history of tachycardia induced cardiomyopathy  Pt has been abstinent from alcohol intake and I applauded his efforts  Ordered sleep study and nuclear stress test due to presence of a fib for evaluation for ALVARO and ischemic heart disease  Close follow up scheduled  Orders:  -     apixaban (ELIQUIS) 5 mg; Take 1 tablet (5 mg total) by mouth 2 (two) times a day  -     KAYLYN; Future; Expected date: 01/10/2022  -     Cardioversion;  Future; Expected date: 01/10/2022  - CBC and differential; Future  -     Comprehensive metabolic panel  -     Home Study; Future  -     NM myocardial perfusion spect (stress and/or rest); Future; Expected date: 01/10/2022    2  Left atrial thrombus  Assessment & Plan:  Noted during KAYLYN during hospitalization on 9/29/2021 and again on outpatient KAYLYN 11/10/2021  Cardioversion aborted  He remains on Eliquis 5mg BID with no missed doses  Will plan for KAYLYN/Cardioversion in coming weeks  He was again advised today on the importance of no missed Eliquis doses  Orders:  -     apixaban (ELIQUIS) 5 mg; Take 1 tablet (5 mg total) by mouth 2 (two) times a day    3  Cardiomyopathy, unspecified type [facility])  Assessment & Plan:  Patient with prior history of tachycardia induced cardiomyopathy with prior EF of 20% that improved to 62% post cardioversion in 2018  EF 8/5/2020 25-30%  KAYLYN on 9/29/2021 with EF 25-30%  There could also be some component of ETOH induced cardiomyopathy  Continue cardiomyopathy specific metoprolol succinate 100mg BID  ACE-I/ARB held in hospital due to CASEY  Labs remain unchanged on recent blood work  Will add ACE-I/ARB if renal function improved  Updated blood work ordered  Consider cardiac MRI    Orders:  -     NM myocardial perfusion spect (stress and/or rest); Future; Expected date: 01/10/2022    4  Chronic systolic congestive heart failure (HCC)  Assessment & Plan:  Wt Readings from Last 3 Encounters:   01/10/22 97 7 kg (215 lb 6 4 oz)   11/30/21 94 8 kg (209 lb)   11/10/21 91 6 kg (202 lb)     Patient with prior history of tachycardia induced cardiomyopathy with prior EF of 20% that improved to 62% post cardioversion in 2018  EF 8/5/2020 25-30%  KAYLYN on 9/29/21 with EF 25-30%  No evidence of volume overload during recent hospitalization or at follow up today  Not on diuretic therapy at present  Reviewed 2g sodium diet, 2L fluid restriction, daily weights    He will report 3 pound weight gain in 1 day/5 pound weight gain in 1 week, shortness of breath, leg swelling, or any other concerns  (weight up today, however he is wearing bulky clothing)  Will continue to monitor  5  Benign essential HTN  Assessment & Plan:  Blood pressure remains acceptable  Continue diltiazem CD 240mg daily, metoprolol succinate 100mg BID  Not on ACE/ARB secondary to renal function  Will continue to monitor  Ordered updated blood work  6  Mixed hyperlipidemia  Assessment & Plan:  Lipid panel 8/4/2020:     HDL 40  LDL 80  Currently on Atorvastatin 40mg daily  7  Stage 3b chronic kidney disease [facility])  Assessment & Plan:  Lab Results   Component Value Date    EGFR 61 11/09/2021    EGFR 58 09/30/2021    EGFR 61 09/29/2021    CREATININE 1 30 11/09/2021    CREATININE 1 34 (H) 09/30/2021    CREATININE 1 29 24/33/3344     Case complicated by CASEY during admission  Avoiding nephrotoxic agents  Updated lab work ordered  THOMAS  Jessica Reeves has a past medical history of paroxysmal atrial fibrillation, cardiomyopathy (possibly tachycardia induced), CHF, HTN, HLD      His atrial fibrillation dates back to 2018 at which time his EF was noted to be 20%  He was referred to [facility] for cardiac catheterization on 2/22/2018 which showed normal coronary arteries  He underwent cardioversion on 3/2018 and was started on Amiodarone  Repeat echo 8/2018 showed improved EF to 62%  He lost his insurance and was lost to follow up      He presented to his PCP office in 2020 for evaluation after a fall and was found to be in A fib with RVR and directed to the [facility] ER where he was admitted  Echo during that admission revealed EF 25-30%  He underwent successful KAYLYN guided cardioversion on 8/6/2020 and was started on Amiodarone loading, Eliquis, and Metoprolol succinate  Repeat echo was recommended in 3 months to assess need for ICD  He did follow up in the office on 9/8/2020 and was awaiting insurance at that time   He was then lost to follow up again      He presented to [facility] ER on 9/24/2021 with c/o fatigue and shortness of breath x 2 weeks that felt similar to his prior episodes of atrial fibrillation  He continued to be without insurance and had been off Eliquis  He was taking Metoprolol up until the time he ran out  At admission he was found to be in A fib with RVR  Rates improved with Diltiazem drip which was eventually transitioned to long-acting oral Diltiazem  Metopolol succinate was resumed and dose titrated  Digoxin was added for assistance with rate control  KAYLYN guided cardioversion was attempted on 9/29/2021, however cardioversion was aborted secondary to presence of left atrium appendage thrombus  He was discharged home on 9/30/2021 with instruction for close follow up in our office and samples of Eliquis      10/18/2021: Avtar presents today for hospital follow up  He states his symptoms are improved, but he continues with poor activity tolerance  He gets winded with minimal exertion  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 and is unable to tolerate that extent of exertion  He denies chest pain, palpitations, or lightheadedness  No leg swelling  Weight is stable  He is taking and tolerating his medications  He has been faithful with Eliquis administration and denies missed doses  More samples provided today  He denies any unusual bleeding/bruising  He received word that his application for medical assistance should be approved soon       11/30/2021: Jessica Reeves presents today for close follow up  He underwent repeat KAYLYN on 11/10/2021, however this continued to demonstrate MOLLY thrombus and cardioversion was aborted  He has been faithful with Eliquis - denies any missed doses  He has been out of work  He does feel he is able to return to work but continues to get short of breath with significant exertion  HR remains borderline today  He denies chest pain, leg swelling, bloating, lightheadedness/dizziness   He does not feel heart racing but continues to feel his irregular pulse  He is tolerating his medications without issue  1/10/2022: Vania Bergeron presents today for routine follow up  He tells me that he now has medical insurance through his employer  He would like to proceed with scheduling a KAYLYN/cardioversion  He also is agreeable to moving forward with a stress test and sleep study  He is taking his medications  He denies any missed doses of Eliquis  He continues with activity intolerance while in a fib  He denies chest pain or racing heart beat but he gets short of breath easily  He denies any new or worsening symptoms  No leg swelling or fluid retention  His weight is up, but he is wearing several layers of clothing and heavy boots      Medical Problems             Problem List     Elevated troponin    Abnormal TSH    Stage 3b chronic kidney disease [facility])    Lab Results   Component Value Date    EGFR 61 11/09/2021    EGFR 58 09/30/2021    EGFR 61 09/29/2021    CREATININE 1 30 11/09/2021    CREATININE 1 34 (H) 09/30/2021    CREATININE 1 29 09/29/2021         Left atrial thrombus    Paroxysmal atrial fibrillation (HCC)    Cardiomyopathy ([facility] [address] )    Chronic systolic congestive heart failure ([facility] [address] )    Wt Readings from Last 3 Encounters:   01/10/22 97 7 kg (215 lb 6 4 oz)   11/30/21 94 8 kg (209 lb)   11/10/21 91 6 kg (202 lb)                 Benign essential HTN    Mixed hyperlipidemia    Alcoholic cardiomyopathy ([facility] [address] )              Past Medical History:   Diagnosis Date    Atrial fibrillation ([facility] [address] )     Hyperlipidemia     Hypertension     Overweight     Systolic heart failure (HCC)     Tachycardia induced cardiomyopathy ([facility] [address] )      Social History     Socioeconomic History    Marital status: Single     Spouse name: Not on file    Number of children: Not on file    Years of education: Not on file    Highest education level: Not on file   Occupational History    Not on file   Tobacco Use    Smoking status: Former Smoker Types: Cigarettes    Smokeless tobacco: Never Used   Vaping Use    Vaping Use: Never used   Substance and Sexual Activity    Alcohol use: Not Currently     Comment: rare now, previously about 12 per week    Drug use: Never    Sexual activity: Yes     Comment: Defer   Other Topics Concern    Not on file   Social History Narrative    Not on file     Social Determinants of Health     Financial Resource Strain: Not on file   Food Insecurity: Not on file   Transportation Needs: Not on file   Physical Activity: Not on file   Stress: Not on file   Social Connections: Not on file   Intimate Partner Violence: Not on file   Housing Stability: Not on file      Family History   Problem Relation Age of Onset    No Known Problems Mother     Colon cancer Father      Past Surgical History:   Procedure Laterality Date    CARDIAC CATHETERIZATION  02/22/2018    CARDIOVERSION  03/2018    CARDIOVERSION  08/06/2020    INGUINAL HERNIA REPAIR      SINUS SURGERY      WISDOM TOOTH EXTRACTION         Current Outpatient Medications:     atorvastatin (LIPITOR) 40 mg tablet, Take 1 tablet (40 mg total) by mouth daily with dinner, Disp: 90 tablet, Rfl: 3    digoxin (LANOXIN) 0 125 mg tablet, Take 1 tablet (125 mcg total) by mouth daily, Disp: 90 tablet, Rfl: 3    diltiazem (CARDIZEM CD) 240 mg 24 hr capsule, Take 1 capsule (240 mg total) by mouth daily, Disp: 90 capsule, Rfl: 3    metoprolol succinate (TOPROL-XL) 100 mg 24 hr tablet, Take 1 tablet (100 mg total) by mouth 2 (two) times a day, Disp: 180 tablet, Rfl: 3    rOPINIRole (REQUIP) 0 5 mg tablet, Take 1 tablet (0 5 mg total) by mouth daily at bedtime, Disp: 90 tablet, Rfl: 3    apixaban (ELIQUIS) 5 mg, Take 1 tablet (5 mg total) by mouth 2 (two) times a day, Disp: 60 tablet, Rfl: 11    magnesium oxide (MAG-OX) 400 mg, Take 1 tablet (400 mg total) by mouth 2 (two) times a day, Disp: 60 tablet, Rfl: 11  No Known Allergies    Labs:     Chemistry        Component Value Date/Time    K 3 9 11/09/2021 1839     11/09/2021 1839    CO2 27 11/09/2021 1839    BUN 20 11/09/2021 1839    CREATININE 1 30 11/09/2021 1839        Component Value Date/Time    CALCIUM 8 4 11/09/2021 1839    ALKPHOS 171 (H) 08/07/2020 0551    AST 25 08/07/2020 0551    ALT 31 08/07/2020 0551            No results found for: CHOL  Lab Results   Component Value Date    HDL 40 08/04/2020     Lab Results   Component Value Date    LDLCALC 89 08/04/2020     Lab Results   Component Value Date    TRIG 111 08/04/2020     Imaging:   KAYLYN  11/10/2021:  EF 30%  Systolic function is moderately reduced  There is moderate global hypokinesis    The left ventricular wall motion is globally hypokinetic    Left Atrium: There is mild spontaneous echo contrast    Left Atrial Appendage: There is a possible small, mobile thrombus in the body    Mitral Valve: There is mild regurgitation    Tricuspid Valve: There is mild regurgitation    Prior KAYLYN study available for comparison  No significant changes noted compared to the prior study    Aorta: There is a non-protruding atheroma in the ascending aorta  Planned cardioversion was aborted       KAYLYN  9/29/2021:  Planned cardioversion was aborted due to possible MOLLY thrombus  SUMMARY LEFT VENTRICLE: The ventricle was mildly dilated  Systolic function was severely reduced  Ejection fraction was estimated in the range of 25 % to 30 %  There was severe diffuse hypokinesis  LEFT ATRIUM: The atrium was dilated  There was evidence of spontaneous echo contrast ("smoke")  LEFT ATRIAL APPENDAGE: There was a possible mass  There was evidence of spontaneous echo contrast ("smoke") in the appendage  ATRIAL SEPTUM: No defect or patent foramen ovale was identified  MITRAL VALVE: There was mild regurgitation  TRICUSPID VALVE: There was trace to mild regurgitation       Echocardiogram 8/5/2020:  Left ventricle is mildly dilated  EF 25-30%  Diffuse hypokinesis    Dilated right ventricle with reduced right ventricular systolic function  Moderately dilated left atrium  Moderately dilated right atrium  Moderate mitral regurgitation  Mild-to-moderate tricuspid regurgitation  Estimated PASP 35 mmHg      Cardiac cath 2/22/2018 [facility]): The coronary arteries are angiographically normal The left ventricular end diastolic pressure was normal          Review of Systems   Constitutional: Positive for malaise/fatigue  HENT: Negative  Cardiovascular: Positive for dyspnea on exertion  Negative for chest pain, irregular heartbeat, leg swelling, near-syncope, orthopnea and palpitations  Respiratory: Negative for cough and snoring  Endocrine: Negative  Skin: Negative  Musculoskeletal: Negative  Gastrointestinal: Negative  Genitourinary: Negative  Neurological: Negative  Psychiatric/Behavioral: Negative  Vitals:    01/10/22 1158   BP: 136/90   Pulse: 74   Temp: (!) 96 °F (35 6 °C)   SpO2: 98%     Vitals:    01/10/22 1158   Weight: 97 7 kg (215 lb 6 4 oz)     Height: 6' 1" (185 4 cm)   Body mass index is 28 42 kg/m²  Physical Exam  Vitals and nursing note reviewed  Constitutional:       General: He is not in acute distress  Appearance: He is well-developed  He is not diaphoretic  HENT:      Head: Normocephalic and atraumatic  Neck:      Vascular: No carotid bruit or JVD  Cardiovascular:      Rate and Rhythm: Normal rate  Rhythm irregular  Pulses: Intact distal pulses  Heart sounds: Normal heart sounds, S1 normal and S2 normal  No murmur heard  No friction rub  No gallop  Comments: No lower leg edema  Pulmonary:      Effort: Pulmonary effort is normal  No respiratory distress  Breath sounds: Normal breath sounds  Abdominal:      General: There is no distension  Palpations: Abdomen is soft  Tenderness: There is no abdominal tenderness  Skin:     General: Skin is warm and dry  Findings: No rash     Neurological:      Mental Status: He is alert and oriented to person, place, and time  Psychiatric:         Mood and Affect: Mood and affect normal          Speech: Speech normal          Behavior: Behavior normal  Behavior is cooperative           Cognition and Memory: Cognition and memory normal

## 2022-01-10 NOTE — H&P (VIEW-ONLY)
Cardiology Follow Up    Marcia Oneill  1965  [id number]  [facility]  [address]  [address] [facility]  [phone]    Shahram Johnson presents today for follow up for atrial fibrillation and cardiomyopathy  1  Paroxysmal atrial fibrillation [facility])  Assessment & Plan:  History of atrial fibrillation in 2018 for which he underwent cardioversion 03/2018  Tachycardia induced cardiomyopathy noted at that time with an EF of 20% which subsequently improved after cardioversion to 62%  Patient underwent repeat KAYLYN guided cardioversion 08/06/2020 with successful conversion to normal sinus rhythm  Patient was started on metoprolol succinate 50 mg twice daily, Amiodarone, and Eliquis  Unfortunately he was lost to follow up secondary to lack of insurance  He presented back to [facility] ER on 9/24/2021 in A fib with RVR  KAYLYN performed on 9/29 in preparation for cardioversion, however, left atrial appendage thrombus identified and cardioversion was aborted  Estimated EF during KAYLYN at 25-30%  He remained on Eliquis 5 mg BID uninterrupted and KAYLYN was performed 11/10/2021 with continued evidence of MOLLY thrombus  Cardioversion was aborted  Continue digoxin 0 125mg daily, diltiazem CD 240mg daily, and metoprolol succinate 100mg BID for rate control  Will orderTEE/Cardioversion again  I would have a low threshold for EP evaluation for ablation if he has recurrent atrial fibrillation given his history of tachycardia induced cardiomyopathy  Pt has been abstinent from alcohol intake and I applauded his efforts  Ordered sleep study and nuclear stress test due to presence of a fib for evaluation for ALVARO and ischemic heart disease  Close follow up scheduled  Orders:  -     apixaban (ELIQUIS) 5 mg; Take 1 tablet (5 mg total) by mouth 2 (two) times a day  -     KAYLYN; Future; Expected date: 01/10/2022  -     Cardioversion;  Future; Expected date: 01/10/2022  - CBC and differential; Future  -     Comprehensive metabolic panel  -     Home Study; Future  -     NM myocardial perfusion spect (stress and/or rest); Future; Expected date: 01/10/2022    2  Left atrial thrombus  Assessment & Plan:  Noted during KAYLYN during hospitalization on 9/29/2021 and again on outpatient KAYLYN 11/10/2021  Cardioversion aborted  He remains on Eliquis 5mg BID with no missed doses  Will plan for KAYLYN/Cardioversion in coming weeks  He was again advised today on the importance of no missed Eliquis doses  Orders:  -     apixaban (ELIQUIS) 5 mg; Take 1 tablet (5 mg total) by mouth 2 (two) times a day    3  Cardiomyopathy, unspecified type [facility])  Assessment & Plan:  Patient with prior history of tachycardia induced cardiomyopathy with prior EF of 20% that improved to 62% post cardioversion in 2018  EF 8/5/2020 25-30%  KAYLYN on 9/29/2021 with EF 25-30%  There could also be some component of ETOH induced cardiomyopathy  Continue cardiomyopathy specific metoprolol succinate 100mg BID  ACE-I/ARB held in hospital due to CASEY  Labs remain unchanged on recent blood work  Will add ACE-I/ARB if renal function improved  Updated blood work ordered  Consider cardiac MRI    Orders:  -     NM myocardial perfusion spect (stress and/or rest); Future; Expected date: 01/10/2022    4  Chronic systolic congestive heart failure (HCC)  Assessment & Plan:  Wt Readings from Last 3 Encounters:   01/10/22 97 7 kg (215 lb 6 4 oz)   11/30/21 94 8 kg (209 lb)   11/10/21 91 6 kg (202 lb)     Patient with prior history of tachycardia induced cardiomyopathy with prior EF of 20% that improved to 62% post cardioversion in 2018  EF 8/5/2020 25-30%  KAYLYN on 9/29/21 with EF 25-30%  No evidence of volume overload during recent hospitalization or at follow up today  Not on diuretic therapy at present  Reviewed 2g sodium diet, 2L fluid restriction, daily weights    He will report 3 pound weight gain in 1 day/5 pound weight gain in 1 week, shortness of breath, leg swelling, or any other concerns  (weight up today, however he is wearing bulky clothing)  Will continue to monitor  5  Benign essential HTN  Assessment & Plan:  Blood pressure remains acceptable  Continue diltiazem CD 240mg daily, metoprolol succinate 100mg BID  Not on ACE/ARB secondary to renal function  Will continue to monitor  Ordered updated blood work  6  Mixed hyperlipidemia  Assessment & Plan:  Lipid panel 8/4/2020:     HDL 40  LDL 80  Currently on Atorvastatin 40mg daily  7  Stage 3b chronic kidney disease [facility])  Assessment & Plan:  Lab Results   Component Value Date    EGFR 61 11/09/2021    EGFR 58 09/30/2021    EGFR 61 09/29/2021    CREATININE 1 30 11/09/2021    CREATININE 1 34 (H) 09/30/2021    CREATININE 1 29 20/18/5469     Case complicated by CASEY during admission  Avoiding nephrotoxic agents  Updated lab work ordered  THOMAS Rai has a past medical history of paroxysmal atrial fibrillation, cardiomyopathy (possibly tachycardia induced), CHF, HTN, HLD      His atrial fibrillation dates back to 2018 at which time his EF was noted to be 20%  He was referred to [facility] for cardiac catheterization on 2/22/2018 which showed normal coronary arteries  He underwent cardioversion on 3/2018 and was started on Amiodarone  Repeat echo 8/2018 showed improved EF to 62%  He lost his insurance and was lost to follow up      He presented to his PCP office in 2020 for evaluation after a fall and was found to be in A fib with RVR and directed to the [address] [place] [address] ER where he was admitted  Echo during that admission revealed EF 25-30%  He underwent successful KAYLYN guided cardioversion on 8/6/2020 and was started on Amiodarone loading, Eliquis, and Metoprolol succinate  Repeat echo was recommended in 3 months to assess need for ICD  He did follow up in the office on 9/8/2020 and was awaiting insurance at that time   He was then lost to follow up again      He presented to [address] [place] [address] on 9/24/2021 with c/o fatigue and shortness of breath x 2 weeks that felt similar to his prior episodes of atrial fibrillation  He continued to be without insurance and had been off Eliquis  He was taking Metoprolol up until the time he ran out  At admission he was found to be in A fib with RVR  Rates improved with Diltiazem drip which was eventually transitioned to long-acting oral Diltiazem  Metopolol succinate was resumed and dose titrated  Digoxin was added for assistance with rate control  KAYLYN guided cardioversion was attempted on 9/29/2021, however cardioversion was aborted secondary to presence of left atrium appendage thrombus  He was discharged home on 9/30/2021 with instruction for close follow up in our office and samples of Eliquis      10/18/2021: Avtar presents today for hospital follow up  He states his symptoms are improved, but he continues with poor activity tolerance  He gets winded with minimal exertion  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 and is unable to tolerate that extent of exertion  He denies chest pain, palpitations, or lightheadedness  No leg swelling  Weight is stable  He is taking and tolerating his medications  He has been faithful with Eliquis administration and denies missed doses  More samples provided today  He denies any unusual bleeding/bruising  He received word that his application for medical assistance should be approved soon       11/30/2021: Ulises Peraza presents today for close follow up  He underwent repeat KAYLYN on 11/10/2021, however this continued to demonstrate MOLYL thrombus and cardioversion was aborted  He has been faithful with Eliquis - denies any missed doses  He has been out of work  He does feel he is able to return to work but continues to get short of breath with significant exertion  HR remains borderline today  He denies chest pain, leg swelling, bloating, lightheadedness/dizziness   He does not feel heart racing but continues to feel his irregular pulse  He is tolerating his medications without issue  1/10/2022: Denny Can presents today for routine follow up  He tells me that he now has medical insurance through his employer  He would like to proceed with scheduling a KAYLYN/cardioversion  He also is agreeable to moving forward with a stress test and sleep study  He is taking his medications  He denies any missed doses of Eliquis  He continues with activity intolerance while in a fib  He denies chest pain or racing heart beat but he gets short of breath easily  He denies any new or worsening symptoms  No leg swelling or fluid retention  His weight is up, but he is wearing several layers of clothing and heavy boots      Medical Problems             Problem List     Elevated troponin    Abnormal TSH    Stage 3b chronic kidney disease [facility])    Lab Results   Component Value Date    EGFR 61 11/09/2021    EGFR 58 09/30/2021    EGFR 61 09/29/2021    CREATININE 1 30 11/09/2021    CREATININE 1 34 (H) 09/30/2021    CREATININE 1 29 09/29/2021         Left atrial thrombus    Paroxysmal atrial fibrillation (HCC)    Cardiomyopathy ([facility] )    Chronic systolic congestive heart failure ([facility] )    Wt Readings from Last 3 Encounters:   01/10/22 97 7 kg (215 lb 6 4 oz)   11/30/21 94 8 kg (209 lb)   11/10/21 91 6 kg (202 lb)                 Benign essential HTN    Mixed hyperlipidemia    Alcoholic cardiomyopathy ([address] )              Past Medical History:   Diagnosis Date    Atrial fibrillation ([facility] )     Hyperlipidemia     Hypertension     Overweight     Systolic heart failure (HCC)     Tachycardia induced cardiomyopathy ([facility] )      Social History     Socioeconomic History    Marital status: Single     Spouse name: Not on file    Number of children: Not on file    Years of education: Not on file    Highest education level: Not on file   Occupational History    Not on file   Tobacco Use    Smoking status: Former Smoker Types: Cigarettes    Smokeless tobacco: Never Used   Vaping Use    Vaping Use: Never used   Substance and Sexual Activity    Alcohol use: Not Currently     Comment: rare now, previously about 12 per week    Drug use: Never    Sexual activity: Yes     Comment: Defer   Other Topics Concern    Not on file   Social History Narrative    Not on file     Social Determinants of Health     Financial Resource Strain: Not on file   Food Insecurity: Not on file   Transportation Needs: Not on file   Physical Activity: Not on file   Stress: Not on file   Social Connections: Not on file   Intimate Partner Violence: Not on file   Housing Stability: Not on file      Family History   Problem Relation Age of Onset    No Known Problems Mother     Colon cancer Father      Past Surgical History:   Procedure Laterality Date    CARDIAC CATHETERIZATION  02/22/2018    CARDIOVERSION  03/2018    CARDIOVERSION  08/06/2020    INGUINAL HERNIA REPAIR      SINUS SURGERY      WISDOM TOOTH EXTRACTION         Current Outpatient Medications:     atorvastatin (LIPITOR) 40 mg tablet, Take 1 tablet (40 mg total) by mouth daily with dinner, Disp: 90 tablet, Rfl: 3    digoxin (LANOXIN) 0 125 mg tablet, Take 1 tablet (125 mcg total) by mouth daily, Disp: 90 tablet, Rfl: 3    diltiazem (CARDIZEM CD) 240 mg 24 hr capsule, Take 1 capsule (240 mg total) by mouth daily, Disp: 90 capsule, Rfl: 3    metoprolol succinate (TOPROL-XL) 100 mg 24 hr tablet, Take 1 tablet (100 mg total) by mouth 2 (two) times a day, Disp: 180 tablet, Rfl: 3    rOPINIRole (REQUIP) 0 5 mg tablet, Take 1 tablet (0 5 mg total) by mouth daily at bedtime, Disp: 90 tablet, Rfl: 3    apixaban (ELIQUIS) 5 mg, Take 1 tablet (5 mg total) by mouth 2 (two) times a day, Disp: 60 tablet, Rfl: 11    magnesium oxide (MAG-OX) 400 mg, Take 1 tablet (400 mg total) by mouth 2 (two) times a day, Disp: 60 tablet, Rfl: 11  No Known Allergies    Labs:     Chemistry        Component Value Date/Time    K 3 9 11/09/2021 1839     11/09/2021 1839    CO2 27 11/09/2021 1839    BUN 20 11/09/2021 1839    CREATININE 1 30 11/09/2021 1839        Component Value Date/Time    CALCIUM 8 4 11/09/2021 1839    ALKPHOS 171 (H) 08/07/2020 0551    AST 25 08/07/2020 0551    ALT 31 08/07/2020 0551            No results found for: CHOL  Lab Results   Component Value Date    HDL 40 08/04/2020     Lab Results   Component Value Date    LDLCALC 89 08/04/2020     Lab Results   Component Value Date    TRIG 111 08/04/2020     Imaging:   KAYLYN  11/10/2021:  EF 30%  Systolic function is moderately reduced  There is moderate global hypokinesis    The left ventricular wall motion is globally hypokinetic    Left Atrium: There is mild spontaneous echo contrast    Left Atrial Appendage: There is a possible small, mobile thrombus in the body    Mitral Valve: There is mild regurgitation    Tricuspid Valve: There is mild regurgitation    Prior KAYLYN study available for comparison  No significant changes noted compared to the prior study    Aorta: There is a non-protruding atheroma in the ascending aorta  Planned cardioversion was aborted       KAYLYN  9/29/2021:  Planned cardioversion was aborted due to possible MOLLY thrombus  SUMMARY LEFT VENTRICLE: The ventricle was mildly dilated  Systolic function was severely reduced  Ejection fraction was estimated in the range of 25 % to 30 %  There was severe diffuse hypokinesis  LEFT ATRIUM: The atrium was dilated  There was evidence of spontaneous echo contrast ("smoke")  LEFT ATRIAL APPENDAGE: There was a possible mass  There was evidence of spontaneous echo contrast ("smoke") in the appendage  ATRIAL SEPTUM: No defect or patent foramen ovale was identified  MITRAL VALVE: There was mild regurgitation  TRICUSPID VALVE: There was trace to mild regurgitation       Echocardiogram 8/5/2020:  Left ventricle is mildly dilated  EF 25-30%  Diffuse hypokinesis    Dilated right ventricle with reduced right ventricular systolic function  Moderately dilated left atrium  Moderately dilated right atrium  Moderate mitral regurgitation  Mild-to-moderate tricuspid regurgitation  Estimated PASP 35 mmHg      Cardiac cath 2/22/2018 [facility]): The coronary arteries are angiographically normal The left ventricular end diastolic pressure was normal          Review of Systems   Constitutional: Positive for malaise/fatigue  HENT: Negative  Cardiovascular: Positive for dyspnea on exertion  Negative for chest pain, irregular heartbeat, leg swelling, near-syncope, orthopnea and palpitations  Respiratory: Negative for cough and snoring  Endocrine: Negative  Skin: Negative  Musculoskeletal: Negative  Gastrointestinal: Negative  Genitourinary: Negative  Neurological: Negative  Psychiatric/Behavioral: Negative  Vitals:    01/10/22 1158   BP: 136/90   Pulse: 74   Temp: (!) 96 °F (35 6 °C)   SpO2: 98%     Vitals:    01/10/22 1158   Weight: 97 7 kg (215 lb 6 4 oz)     Height: 6' 1" (185 4 cm)   Body mass index is 28 42 kg/m²  Physical Exam  Vitals and nursing note reviewed  Constitutional:       General: He is not in acute distress  Appearance: He is well-developed  He is not diaphoretic  HENT:      Head: Normocephalic and atraumatic  Neck:      Vascular: No carotid bruit or JVD  Cardiovascular:      Rate and Rhythm: Normal rate  Rhythm irregular  Pulses: Intact distal pulses  Heart sounds: Normal heart sounds, S1 normal and S2 normal  No murmur heard  No friction rub  No gallop  Comments: No lower leg edema  Pulmonary:      Effort: Pulmonary effort is normal  No respiratory distress  Breath sounds: Normal breath sounds  Abdominal:      General: There is no distension  Palpations: Abdomen is soft  Tenderness: There is no abdominal tenderness  Skin:     General: Skin is warm and dry  Findings: No rash     Neurological:      Mental Status: He is alert and oriented to person, place, and time  Psychiatric:         Mood and Affect: Mood and affect normal          Speech: Speech normal          Behavior: Behavior normal  Behavior is cooperative           Cognition and Memory: Cognition and memory normal

## 2022-01-11 NOTE — ASSESSMENT & PLAN NOTE
Patient with prior history of tachycardia induced cardiomyopathy with prior EF of 20% that improved to 62% post cardioversion in 2018  EF 8/5/2020 25-30%  KAYLYN on 9/29/2021 with EF 25-30%  There could also be some component of ETOH induced cardiomyopathy  Continue cardiomyopathy specific metoprolol succinate 100mg BID  ACE-I/ARB held in hospital due to CASEY  Labs remain unchanged on recent blood work  Will add ACE-I/ARB if renal function improved  Updated blood work ordered    Consider cardiac MRI

## 2022-01-11 NOTE — ASSESSMENT & PLAN NOTE
Wt Readings from Last 3 Encounters:   01/10/22 97 7 kg (215 lb 6 4 oz)   11/30/21 94 8 kg (209 lb)   11/10/21 91 6 kg (202 lb)     Patient with prior history of tachycardia induced cardiomyopathy with prior EF of 20% that improved to 62% post cardioversion in 2018  EF 8/5/2020 25-30%  KAYLYN on 9/29/21 with EF 25-30%  No evidence of volume overload during recent hospitalization or at follow up today  Not on diuretic therapy at present  Reviewed 2g sodium diet, 2L fluid restriction, daily weights  He will report 3 pound weight gain in 1 day/5 pound weight gain in 1 week, shortness of breath, leg swelling, or any other concerns  (weight up today, however he is wearing bulky clothing)  Will continue to monitor

## 2022-01-11 NOTE — ASSESSMENT & PLAN NOTE
Lab Results   Component Value Date    EGFR 61 11/09/2021    EGFR 58 09/30/2021    EGFR 61 09/29/2021    CREATININE 1 30 11/09/2021    CREATININE 1 34 (H) 09/30/2021    CREATININE 1 29 30/20/7880     Case complicated by CASEY during admission  Avoiding nephrotoxic agents  Updated lab work ordered

## 2022-01-11 NOTE — ASSESSMENT & PLAN NOTE
History of atrial fibrillation in 2018 for which he underwent cardioversion 03/2018  Tachycardia induced cardiomyopathy noted at that time with an EF of 20% which subsequently improved after cardioversion to 62%  Patient underwent repeat KAYLYN guided cardioversion 08/06/2020 with successful conversion to normal sinus rhythm  Patient was started on metoprolol succinate 50 mg twice daily, Amiodarone, and Eliquis  Unfortunately he was lost to follow up secondary to lack of insurance  He presented back to [address] [place] [address] on 9/24/2021 in A fib with RVR  KAYLYN performed on 9/29 in preparation for cardioversion, however, left atrial appendage thrombus identified and cardioversion was aborted  Estimated EF during KAYLYN at 25-30%  He remained on Eliquis 5 mg BID uninterrupted and KAYLYN was performed 11/10/2021 with continued evidence of MOLLY thrombus  Cardioversion was aborted  Continue digoxin 0 125mg daily, diltiazem CD 240mg daily, and metoprolol succinate 100mg BID for rate control  Will orderTEE/Cardioversion again  I would have a low threshold for EP evaluation for ablation if he has recurrent atrial fibrillation given his history of tachycardia induced cardiomyopathy  Pt has been abstinent from alcohol intake and I applauded his efforts  Ordered sleep study and nuclear stress test due to presence of a fib for evaluation for ALVARO and ischemic heart disease  Close follow up scheduled

## 2022-01-11 NOTE — ASSESSMENT & PLAN NOTE
Blood pressure remains acceptable  Continue diltiazem CD 240mg daily, metoprolol succinate 100mg BID  Not on ACE/ARB secondary to renal function  Will continue to monitor  Ordered updated blood work

## 2022-01-11 NOTE — ASSESSMENT & PLAN NOTE
Noted during KAYLYN during hospitalization on 9/29/2021 and again on outpatient KAYLYN 11/10/2021  Cardioversion aborted  He remains on Eliquis 5mg BID with no missed doses  Will plan for KAYLYN/Cardioversion in coming weeks  He was again advised today on the importance of no missed Eliquis doses

## 2022-01-12 ENCOUNTER — TELEPHONE (OUTPATIENT)
Dept: SLEEP CENTER | Facility: CLINIC | Age: 57
End: 2022-01-12

## 2022-01-12 NOTE — TELEPHONE ENCOUNTER
----- Message from Veronica Dejesus MD sent at 1/12/2022 11:37 AM EST -----  Approved  ----- Message -----  From: Nakia Duncan  Sent: 1/11/2022   7:35 AM EST  To: Sleep Medicine [place] Provider    This sleep study needs approval      If approved please sign and return to clerical pool  If denied please include reasons why  Also provide alternative testing if warranted  Please sign and return to clerical pool

## 2022-01-13 ENCOUNTER — TELEPHONE (OUTPATIENT)
Dept: CARDIOLOGY CLINIC | Facility: CLINIC | Age: 57
End: 2022-01-13

## 2022-01-13 NOTE — TELEPHONE ENCOUNTER
Can you please call pt to find out if he was able to purchase Eliquis? I had asked him to call back with cost  I do not want him running out and being without anticoagulation  Thank you!

## 2022-01-14 NOTE — TELEPHONE ENCOUNTER
I reached Simone Yoder  He states the [employer] script is still at [facility]  He did not pick it up yet so he is not sure on cost  He will call Monday to let us know how much it is   He has enough Eliquis to get through until next week

## 2022-01-17 ENCOUNTER — TELEPHONE (OUTPATIENT)
Dept: CARDIOLOGY CLINIC | Facility: CLINIC | Age: 57
End: 2022-01-17

## 2022-01-17 NOTE — LETTER
01/20/22    Dear Poppy Nicolsa,    I have made several attempts to call you by phone  It is important that you contact me back so that I can assist with you  I do not have insurance on file for you, and you have upcoming studies scheduled  If you do have insurance I may need to complete an authorization for you       Sincerely,       Christiano Torre

## 2022-01-20 NOTE — TELEPHONE ENCOUNTER
Pt does not have insurance in the system, and I cannot get a hold of him or his emergency contact  He has a study on feb 2nd, and feb 9th and I need to know if he has insurance  I will also send letter to pt to call us

## 2022-01-28 ENCOUNTER — TELEPHONE (OUTPATIENT)
Dept: CARDIOLOGY CLINIC | Facility: CLINIC | Age: 57
End: 2022-01-28

## 2022-01-31 ENCOUNTER — APPOINTMENT (OUTPATIENT)
Dept: LAB | Facility: HOSPITAL | Age: 57
End: 2022-01-31
Payer: COMMERCIAL

## 2022-01-31 DIAGNOSIS — I48.0 PAROXYSMAL ATRIAL FIBRILLATION (HCC): ICD-10-CM

## 2022-01-31 LAB
ALBUMIN SERPL BCP-MCNC: 3.6 G/DL (ref 3.5–5)
ALP SERPL-CCNC: 99 U/L (ref 46–116)
ALT SERPL W P-5'-P-CCNC: 64 U/L (ref 12–78)
ANION GAP SERPL CALCULATED.3IONS-SCNC: 10 MMOL/L (ref 4–13)
AST SERPL W P-5'-P-CCNC: 26 U/L (ref 5–45)
BASOPHILS # BLD AUTO: 0.07 THOUSANDS/ΜL (ref 0–0.1)
BASOPHILS NFR BLD AUTO: 1 % (ref 0–1)
BILIRUB SERPL-MCNC: 0.32 MG/DL (ref 0.2–1)
BUN SERPL-MCNC: 23 MG/DL (ref 5–25)
CALCIUM SERPL-MCNC: 8.9 MG/DL (ref 8.3–10.1)
CHLORIDE SERPL-SCNC: 104 MMOL/L (ref 100–108)
CO2 SERPL-SCNC: 27 MMOL/L (ref 21–32)
CREAT SERPL-MCNC: 1.27 MG/DL (ref 0.6–1.3)
EOSINOPHIL # BLD AUTO: 0.58 THOUSAND/ΜL (ref 0–0.61)
EOSINOPHIL NFR BLD AUTO: 6 % (ref 0–6)
ERYTHROCYTE [DISTWIDTH] IN BLOOD BY AUTOMATED COUNT: 13 % (ref 11.6–15.1)
GFR SERPL CREATININE-BSD FRML MDRD: 62 ML/MIN/1.73SQ M
GLUCOSE SERPL-MCNC: 112 MG/DL (ref 65–140)
HCT VFR BLD AUTO: 49.5 % (ref 36.5–49.3)
HGB BLD-MCNC: 16.3 G/DL (ref 12–17)
IMM GRANULOCYTES # BLD AUTO: 0.02 THOUSAND/UL (ref 0–0.2)
IMM GRANULOCYTES NFR BLD AUTO: 0 % (ref 0–2)
LYMPHOCYTES # BLD AUTO: 3.23 THOUSANDS/ΜL (ref 0.6–4.47)
LYMPHOCYTES NFR BLD AUTO: 31 % (ref 14–44)
MCH RBC QN AUTO: 31.5 PG (ref 26.8–34.3)
MCHC RBC AUTO-ENTMCNC: 32.9 G/DL (ref 31.4–37.4)
MCV RBC AUTO: 96 FL (ref 82–98)
MONOCYTES # BLD AUTO: 1.05 THOUSAND/ΜL (ref 0.17–1.22)
MONOCYTES NFR BLD AUTO: 10 % (ref 4–12)
NEUTROPHILS # BLD AUTO: 5.39 THOUSANDS/ΜL (ref 1.85–7.62)
NEUTS SEG NFR BLD AUTO: 52 % (ref 43–75)
NRBC BLD AUTO-RTO: 0 /100 WBCS
PLATELET # BLD AUTO: 307 THOUSANDS/UL (ref 149–390)
PMV BLD AUTO: 9.9 FL (ref 8.9–12.7)
POTASSIUM SERPL-SCNC: 4.2 MMOL/L (ref 3.5–5.3)
PROT SERPL-MCNC: 7.5 G/DL (ref 6.4–8.2)
RBC # BLD AUTO: 5.18 MILLION/UL (ref 3.88–5.62)
SODIUM SERPL-SCNC: 141 MMOL/L (ref 136–145)
WBC # BLD AUTO: 10.34 THOUSAND/UL (ref 4.31–10.16)

## 2022-01-31 PROCEDURE — 80053 COMPREHEN METABOLIC PANEL: CPT | Performed by: NURSE PRACTITIONER

## 2022-01-31 PROCEDURE — 85025 COMPLETE CBC W/AUTO DIFF WBC: CPT

## 2022-02-01 ENCOUNTER — TELEPHONE (OUTPATIENT)
Dept: NON INVASIVE DIAGNOSTICS | Facility: HOSPITAL | Age: 57
End: 2022-02-01

## 2022-02-01 ENCOUNTER — TELEPHONE (OUTPATIENT)
Dept: CARDIOLOGY CLINIC | Facility: CLINIC | Age: 57
End: 2022-02-01

## 2022-02-01 NOTE — TELEPHONE ENCOUNTER
----- Message from Ebenezer Wren sent at 2/1/2022  1:12 PM EST -----  Please let pt know that lab work is stable  Also, can you find out if he was able to purchase Eliquis?

## 2022-02-01 NOTE — TELEPHONE ENCOUNTER
Left detailed msg for patient with these instructions: 1)  NPO after midnight except taking meds with sip of water in Am (emphasized to take eliquis before coming to hospital with sip of water)  Also asked patient to bring his insurance card, photo ID and medicine list with him  Instructed him to have a  with him because he can't drive after receiving anesthesia   Also gave directions to registration desk and instructed him and his  to wear masks to come into the hospital

## 2022-02-02 ENCOUNTER — HOSPITAL ENCOUNTER (OUTPATIENT)
Dept: NON INVASIVE DIAGNOSTICS | Facility: HOSPITAL | Age: 57
Discharge: HOME | End: 2022-02-02
Payer: COMMERCIAL

## 2022-02-02 ENCOUNTER — ANESTHESIA EVENT (OUTPATIENT)
Dept: NON INVASIVE DIAGNOSTICS | Facility: HOSPITAL | Age: 57
End: 2022-02-02

## 2022-02-02 ENCOUNTER — ANESTHESIA (OUTPATIENT)
Dept: NON INVASIVE DIAGNOSTICS | Facility: HOSPITAL | Age: 57
End: 2022-02-02

## 2022-02-02 VITALS
SYSTOLIC BLOOD PRESSURE: 131 MMHG | TEMPERATURE: 98.2 F | HEART RATE: 93 BPM | HEIGHT: 73 IN | BODY MASS INDEX: 28.49 KG/M2 | OXYGEN SATURATION: 98 % | DIASTOLIC BLOOD PRESSURE: 107 MMHG | WEIGHT: 215 LBS | RESPIRATION RATE: 20 BRPM

## 2022-02-02 DIAGNOSIS — I48.0 PAROXYSMAL ATRIAL FIBRILLATION (HCC): ICD-10-CM

## 2022-02-02 PROCEDURE — 93005 ELECTROCARDIOGRAM TRACING: CPT

## 2022-02-02 PROCEDURE — 93312 ECHO TRANSESOPHAGEAL: CPT

## 2022-02-02 RX ORDER — LIDOCAINE HYDROCHLORIDE 10 MG/ML
INJECTION, SOLUTION EPIDURAL; INFILTRATION; INTRACAUDAL; PERINEURAL AS NEEDED
Status: DISCONTINUED | OUTPATIENT
Start: 2022-02-02 | End: 2022-02-02

## 2022-02-02 RX ORDER — PROPOFOL 10 MG/ML
INJECTION, EMULSION INTRAVENOUS AS NEEDED
Status: DISCONTINUED | OUTPATIENT
Start: 2022-02-02 | End: 2022-02-02

## 2022-02-02 RX ORDER — PROPOFOL 10 MG/ML
INJECTION, EMULSION INTRAVENOUS CONTINUOUS PRN
Status: DISCONTINUED | OUTPATIENT
Start: 2022-02-02 | End: 2022-02-02

## 2022-02-02 RX ORDER — KETAMINE HYDROCHLORIDE 50 MG/ML
INJECTION, SOLUTION, CONCENTRATE INTRAMUSCULAR; INTRAVENOUS AS NEEDED
Status: DISCONTINUED | OUTPATIENT
Start: 2022-02-02 | End: 2022-02-02

## 2022-02-02 RX ORDER — EPHEDRINE SULFATE 50 MG/ML
INJECTION INTRAVENOUS AS NEEDED
Status: DISCONTINUED | OUTPATIENT
Start: 2022-02-02 | End: 2022-02-02

## 2022-02-02 RX ORDER — SODIUM CHLORIDE, SODIUM LACTATE, POTASSIUM CHLORIDE, CALCIUM CHLORIDE 600; 310; 30; 20 MG/100ML; MG/100ML; MG/100ML; MG/100ML
INJECTION, SOLUTION INTRAVENOUS CONTINUOUS PRN
Status: DISCONTINUED | OUTPATIENT
Start: 2022-02-02 | End: 2022-02-02

## 2022-02-02 RX ADMIN — PROPOFOL 80 MG: 10 INJECTION, EMULSION INTRAVENOUS at 08:10

## 2022-02-02 RX ADMIN — LIDOCAINE HYDROCHLORIDE 100 MG: 10 INJECTION, SOLUTION EPIDURAL; INFILTRATION; INTRACAUDAL; PERINEURAL at 08:10

## 2022-02-02 RX ADMIN — PROPOFOL 100 MCG/KG/MIN: 10 INJECTION, EMULSION INTRAVENOUS at 08:10

## 2022-02-02 RX ADMIN — KETAMINE HYDROCHLORIDE 30 MG: 50 INJECTION INTRAMUSCULAR; INTRAVENOUS at 08:09

## 2022-02-02 RX ADMIN — PROPOFOL 20 MG: 10 INJECTION, EMULSION INTRAVENOUS at 08:22

## 2022-02-02 RX ADMIN — EPHEDRINE SULFATE 5 MG: 50 INJECTION, SOLUTION INTRAVENOUS at 08:08

## 2022-02-02 RX ADMIN — SODIUM CHLORIDE, SODIUM LACTATE, POTASSIUM CHLORIDE, AND CALCIUM CHLORIDE: .6; .31; .03; .02 INJECTION, SOLUTION INTRAVENOUS at 08:05

## 2022-02-02 NOTE — DISCHARGE INSTRUCTIONS
Transesophageal Echocardiogram   WHAT YOU NEED TO KNOW:   A transesophageal echocardiogram (KAYLYN) is a procedure used to check for problems with your heart  It will also show any problems in the blood vessels near your heart  Sound waves are sent to the heart through a tube inserted into your throat  The sound waves show the structure and function of your heart through pictures on a monitor  DISCHARGE INSTRUCTIONS:   Call your local emergency number (911 in the [address] [facility]) if:   · You have any of the following signs of a heart attack:      ? Squeezing, pressure, or pain in your chest    ? You may  also have any of the following:     § Discomfort or pain in your back, neck, jaw, stomach, or arm    § Shortness of breath    § Nausea or vomiting    § Lightheadedness or a sudden cold sweat      Call your doctor or cardiologist if:   · You have a fever or chills  · You taste blood  · You have a severe sore throat or trouble swallowing  · You have questions or concerns about your condition or care  Do not eat or drink anything until you are able to swallow normally:  Start with soft foods, such as oatmeal, yogurt, or applesauce  When you can eat soft foods easily, you may begin to eat solid foods  What you can do to keep your heart healthy:   · Eat heart-healthy foods  Eat whole grains, fruits, and vegetables every day  Limit salt and high-fat foods  Ask your healthcare provider for more information on a heart-healthy diet  · Exercise as directed  Your healthcare provider may suggest an exercise program to help improve your heart health  It is best to start slowly, and do more as you get stronger  Do not start an exercise program without talking with your healthcare provider  · Maintain a healthy weight  Keep a healthy weight so your heart does not have to work so hard  Ask what weight is healthy for you  Your healthcare provider can help you create a safe weight-loss plan, if needed      · Manage your medical conditions  High blood pressure, high cholesterol, and diabetes can lead to heart problems  Work with your healthcare provider to manage your medical conditions and decrease your risk for heart problems  · Do not smoke  Nicotine and other chemicals in cigarettes and cigars can cause lung damage  Ask your healthcare provider for information if you currently smoke and need help to quit  E-cigarettes or smokeless tobacco still contain nicotine  Talk to your healthcare provider before you use these products  Follow up with your doctor or cardiologist as directed:  Write down your questions so you remember to ask them during your visits  © Copyright [address] 2021 Information is for End User's use only and may not be sold, redistributed or otherwise used for commercial purposes  All illustrations and images included in CareNotes® are the copyrighted property of [employer]  or [facility] Wilberto Zimmer   The above information is an  only  It is not intended as medical advice for individual conditions or treatments  Talk to your doctor, nurse or pharmacist before following any medical regimen to see if it is safe and effective for you

## 2022-02-02 NOTE — ANESTHESIA POSTPROCEDURE EVALUATION
Post-Op Assessment Note    CV Status:  Stable  Pain Score: 0    Pain management: adequate     Mental Status:  Alert and awake   Hydration Status:  Euvolemic   PONV Controlled:  Controlled   Airway Patency:  Patent      Post Op Vitals Reviewed: Yes      Staff: CRNA         No complications documented      BP (!) 134/102 (02/02/22 0828)    Temp 98 1   Pulse (!) 113 (02/02/22 0828)   Resp 20 (02/02/22 0828)    SpO2 97 % (02/02/22 0828)

## 2022-02-02 NOTE — INTERVAL H&P NOTE
Update: (This section must be completed if the H&P was completed greater than 24 hrs to procedure or admission)    H&P reviewed  After examining the patient, I find no changed to the H&P since it had been written  Patient re-evaluated   Accept as history and physical     Mary Garcia, DO/February 2, 2022/8:03 AM

## 2022-02-02 NOTE — Clinical Note
Patient to [address] via litter with CRNA escort  Report given to [facility], bedside nurse

## 2022-02-02 NOTE — ANESTHESIA PREPROCEDURE EVALUATION
Procedure:  KAYLYN    Relevant Problems   CARDIO   (+) Benign essential HTN   (+) Chronic systolic congestive heart failure (HCC)   (+) Mixed hyperlipidemia   (+) Paroxysmal atrial fibrillation (HCC)      /RENAL   (+) Stage 3b chronic kidney disease (HCC)        CAD/PCI/MI/CHF -- NYHA 2 SX, LAST CHF ADMISSION 2021  COPD/ASTHMA/ALVARO -- DENIES  PROBS WITH PRIOR ANESTHESIA -- DENIES  NPO STATUS CONFIRMED    Lab Results   Component Value Date    WBC 10 34 (H) 2022    HGB 16 3 2022     2022     Lab Results   Component Value Date    SODIUM 141 2022    K 4 2 2022    BUN 23 2022    CREATININE 1 27 2022    EGFR 62 2022     Lab Results   Component Value Date    PTT 27 2020      Lab Results   Component Value Date    INR 1 12 2020         Lab Results   Component Value Date    HGBA1C 5 5 2020       Indications  Priority: Routine  AFib       Interpretation Summary    [facility] [employer] [place], [place] [address]     Transesophageal Echocardiogram  2D, 3D, Doppler, and Color Doppler     Study date:  28-Sep-2021     Patient: Elias Dover  MR number: [id number]  Account number: [de-identified]  : 1965  Age: 61 years  Gender: Male  Status: Inpatient  Location: [address] [place] [address]  Height: 72 in  Weight: 204 lb  BP: 115/ 76 mmHg     Indications: Atrial Fibrillation     Diagnoses: I48 0 - Atrial fibrillation     Sonographer:  MELONY Fair  Primary Physician:  Vern Welch MD  Referring Physician:  Millie Deras DO  Group:  [facility]  Interpreting Physician:  Millie Deras DO     IMPRESSIONS:  Planned cardioversion was aborted due to possible MOLLY thrombus      SUMMARY     LEFT VENTRICLE:  The ventricle was mildly dilated  Systolic function was severely reduced  Ejection fraction was estimated in the range of 25 % to 30 %    There was severe diffuse hypokinesis      LEFT ATRIUM:  The atrium was dilated  There was evidence of spontaneous echo contrast ("smoke")      LEFT ATRIAL APPENDAGE:  There was a possible mass  There was evidence of spontaneous echo contrast ("smoke") in the appendage      ATRIAL SEPTUM:  No defect or patent foramen ovale was identified      MITRAL VALVE:  There was mild regurgitation      TRICUSPID VALVE:  There was trace to mild regurgitation  Physical Exam    Airway    Mallampati score: II  TM Distance: >3 FB       Dental   No notable dental hx     Cardiovascular      Pulmonary      Other Findings        Anesthesia Plan  ASA Score- 3     Anesthesia Type- IV sedation with anesthesia with ASA Monitors  Additional Monitors:   Airway Plan:     Comment: JORDI Childers , have personally seen and evaluated the patient prior to anesthetic care  I have reviewed the pre-anesthetic record, and other medical records if appropriate to the anesthetic care  If a CRNA is involved in the case, I have reviewed the CRNA assessment, if present, and agree  Risks/benefits and alternatives discussed with patient including possible PONV, sore throat, and possibility of rare anesthetic and surgical emergencies          Plan Factors-    Chart reviewed  EKG reviewed  Imaging results reviewed  Existing labs reviewed  Patient summary reviewed  Patient instructed to abstain from smoking on day of procedure  Obstructive sleep apnea risk education given perioperatively  Induction-     Postoperative Plan-     Informed Consent- Anesthetic plan and risks discussed with patient  I personally reviewed this patient with the CRNA  Discussed and agreed on the Anesthesia Plan with the MAC Esteban

## 2022-02-04 LAB
ATRIAL RATE: 92 BPM
QRS AXIS: 49 DEGREES
QRSD INTERVAL: 82 MS
QT INTERVAL: 358 MS
QTC INTERVAL: 440 MS
SL CV LV EF: 30
T WAVE AXIS: 76 DEGREES
VENTRICULAR RATE: 91 BPM

## 2022-02-04 PROCEDURE — 93320 DOPPLER ECHO COMPLETE: CPT | Performed by: INTERNAL MEDICINE

## 2022-02-04 PROCEDURE — 93312 ECHO TRANSESOPHAGEAL: CPT | Performed by: INTERNAL MEDICINE

## 2022-02-04 PROCEDURE — 93325 DOPPLER ECHO COLOR FLOW MAPG: CPT | Performed by: INTERNAL MEDICINE

## 2022-02-08 ENCOUNTER — TELEPHONE (OUTPATIENT)
Dept: CARDIOLOGY CLINIC | Facility: CLINIC | Age: 57
End: 2022-02-08

## 2022-02-08 DIAGNOSIS — I48.0 PAROXYSMAL ATRIAL FIBRILLATION (HCC): Primary | ICD-10-CM

## 2022-02-08 NOTE — TELEPHONE ENCOUNTER
Pt called and said he went for his procedure and they found a spot and they weren't sure if it was a shadow and no one got back to him with the answer and he also has a couple of other questions for [facility]    Please call  [phone]

## 2022-02-10 NOTE — TELEPHONE ENCOUNTER
I spoke with Dr Zaheer Contreras who agrees with KAYLYN prior to cardioversion given recent Covid infection  Baljinder Marcano, I placed orders for KAYLYN/cardioversion  Dr Zaheer Contreras said likely the week of March 14th if you are able to contact patient to schedule? Thank you!

## 2022-02-10 NOTE — TELEPHONE ENCOUNTER
Spoke with pt  I informed him that I was waiting for Dr Gusman Erps reply, which is that he does not see evidence of a thrombus on the KAYLYN from 2/2  I asked when he wants to schedule the cardioversion, and he tells me that he tested positive for Covid infection on Tuesday of this week  His symptoms started Monday 2/27/2022  I will discuss with Dr Tucker Elizondo but given this new information he should likely have another KAYLYN before proceeding with cardioversion, and he agreed  I advised that we will need to wait until at least 10 days from symptom onset  He tells me that he is already starting to feel better  Will discuss with Dr Tucker Elizondo and scheduling at the hospital before contacting him with the appointment information  He verbalized understanding

## 2022-02-14 ENCOUNTER — TELEPHONE (OUTPATIENT)
Dept: NON INVASIVE DIAGNOSTICS | Facility: HOSPITAL | Age: 57
End: 2022-02-14

## 2022-02-16 ENCOUNTER — TELEPHONE (OUTPATIENT)
Dept: NON INVASIVE DIAGNOSTICS | Facility: HOSPITAL | Age: 57
End: 2022-02-16

## 2022-02-16 NOTE — TELEPHONE ENCOUNTER
I was able to reach patient at his emergency contact number, Tavia Silva  He confirmed that he can do that date and time  I did ask that he check his voicemails (I let him know his mailbox is full) and asked that he call you back to confirm with you

## 2022-02-16 NOTE — TELEPHONE ENCOUNTER
Attempted to call patient again to try and set up KAYLYN/CV for 8am procedure time on March 16  Voicemail is full and I can not leave message  (left message previously but patient never returned a phone call like I requested to verify appt date and time)

## 2022-02-22 NOTE — TELEPHONE ENCOUNTER
Patient and I finally spoke today to confirm that he is on the schedule for his KAYLYN/CV on 3/16/22 at 8am procedure time

## 2022-02-23 ENCOUNTER — HOSPITAL ENCOUNTER (EMERGENCY)
Facility: HOSPITAL | Age: 57
Discharge: HOME | End: 2022-02-23
Attending: EMERGENCY MEDICINE | Admitting: EMERGENCY MEDICINE
Payer: COMMERCIAL

## 2022-02-23 ENCOUNTER — APPOINTMENT (EMERGENCY)
Dept: RADIOLOGY | Facility: HOSPITAL | Age: 57
End: 2022-02-23
Payer: COMMERCIAL

## 2022-02-23 VITALS
WEIGHT: 216.27 LBS | OXYGEN SATURATION: 97 % | BODY MASS INDEX: 28.53 KG/M2 | RESPIRATION RATE: 20 BRPM | DIASTOLIC BLOOD PRESSURE: 78 MMHG | HEART RATE: 97 BPM | SYSTOLIC BLOOD PRESSURE: 118 MMHG | TEMPERATURE: 99.3 F

## 2022-02-23 DIAGNOSIS — U07.1 COVID-19 VIRUS INFECTION: ICD-10-CM

## 2022-02-23 DIAGNOSIS — J02.0 STREP PHARYNGITIS: Primary | ICD-10-CM

## 2022-02-23 LAB
ALBUMIN SERPL BCP-MCNC: 3.4 G/DL (ref 3.5–5)
ALP SERPL-CCNC: 94 U/L (ref 46–116)
ALT SERPL W P-5'-P-CCNC: 28 U/L (ref 12–78)
ANION GAP SERPL CALCULATED.3IONS-SCNC: 9 MMOL/L (ref 4–13)
AST SERPL W P-5'-P-CCNC: 18 U/L (ref 5–45)
BASOPHILS # BLD AUTO: 0.07 THOUSANDS/ΜL (ref 0–0.1)
BASOPHILS NFR BLD AUTO: 0 % (ref 0–1)
BILIRUB SERPL-MCNC: 0.53 MG/DL (ref 0.2–1)
BUN SERPL-MCNC: 13 MG/DL (ref 5–25)
CALCIUM ALBUM COR SERPL-MCNC: 8.8 MG/DL (ref 8.3–10.1)
CALCIUM SERPL-MCNC: 8.3 MG/DL (ref 8.3–10.1)
CARDIAC TROPONIN I PNL SERPL HS: 5 NG/L
CHLORIDE SERPL-SCNC: 103 MMOL/L (ref 100–108)
CO2 SERPL-SCNC: 25 MMOL/L (ref 21–32)
CREAT SERPL-MCNC: 1.19 MG/DL (ref 0.6–1.3)
DIGOXIN SERPL-MCNC: 0.6 NG/ML (ref 0.8–2)
EOSINOPHIL # BLD AUTO: 0.1 THOUSAND/ΜL (ref 0–0.61)
EOSINOPHIL NFR BLD AUTO: 1 % (ref 0–6)
ERYTHROCYTE [DISTWIDTH] IN BLOOD BY AUTOMATED COUNT: 11.9 % (ref 11.6–15.1)
FLUAV RNA RESP QL NAA+PROBE: NEGATIVE
FLUBV RNA RESP QL NAA+PROBE: NEGATIVE
GFR SERPL CREATININE-BSD FRML MDRD: 67 ML/MIN/1.73SQ M
GLUCOSE SERPL-MCNC: 127 MG/DL (ref 65–140)
HCT VFR BLD AUTO: 45.5 % (ref 36.5–49.3)
HGB BLD-MCNC: 15.8 G/DL (ref 12–17)
IMM GRANULOCYTES # BLD AUTO: 0.09 THOUSAND/UL (ref 0–0.2)
IMM GRANULOCYTES NFR BLD AUTO: 1 % (ref 0–2)
LACTATE SERPL-SCNC: 1.5 MMOL/L (ref 0.5–2)
LYMPHOCYTES # BLD AUTO: 2.53 THOUSANDS/ΜL (ref 0.6–4.47)
LYMPHOCYTES NFR BLD AUTO: 14 % (ref 14–44)
MAGNESIUM SERPL-MCNC: 1.9 MG/DL (ref 1.6–2.6)
MCH RBC QN AUTO: 31.5 PG (ref 26.8–34.3)
MCHC RBC AUTO-ENTMCNC: 34.7 G/DL (ref 31.4–37.4)
MCV RBC AUTO: 91 FL (ref 82–98)
MONOCYTES # BLD AUTO: 1.86 THOUSAND/ΜL (ref 0.17–1.22)
MONOCYTES NFR BLD AUTO: 11 % (ref 4–12)
NEUTROPHILS # BLD AUTO: 12.91 THOUSANDS/ΜL (ref 1.85–7.62)
NEUTS SEG NFR BLD AUTO: 73 % (ref 43–75)
NRBC BLD AUTO-RTO: 0 /100 WBCS
NT-PROBNP SERPL-MCNC: 1470 PG/ML
PLATELET # BLD AUTO: 273 THOUSANDS/UL (ref 149–390)
PMV BLD AUTO: 10.6 FL (ref 8.9–12.7)
POTASSIUM SERPL-SCNC: 3.5 MMOL/L (ref 3.5–5.3)
PROT SERPL-MCNC: 7.6 G/DL (ref 6.4–8.2)
RBC # BLD AUTO: 5.02 MILLION/UL (ref 3.88–5.62)
RSV RNA RESP QL NAA+PROBE: NEGATIVE
S PYO DNA THROAT QL NAA+PROBE: DETECTED
SARS-COV-2 RNA RESP QL NAA+PROBE: POSITIVE
SODIUM SERPL-SCNC: 137 MMOL/L (ref 136–145)
WBC # BLD AUTO: 17.56 THOUSAND/UL (ref 4.31–10.16)

## 2022-02-23 PROCEDURE — 36415 COLL VENOUS BLD VENIPUNCTURE: CPT | Performed by: PHYSICIAN ASSISTANT

## 2022-02-23 PROCEDURE — 99284 EMERGENCY DEPT VISIT MOD MDM: CPT

## 2022-02-23 PROCEDURE — 0241U HB NFCT DS VIR RESP RNA 4 TRGT: CPT | Performed by: PHYSICIAN ASSISTANT

## 2022-02-23 PROCEDURE — 93005 ELECTROCARDIOGRAM TRACING: CPT

## 2022-02-23 PROCEDURE — 83880 ASSAY OF NATRIURETIC PEPTIDE: CPT | Performed by: PHYSICIAN ASSISTANT

## 2022-02-23 PROCEDURE — 85025 COMPLETE CBC W/AUTO DIFF WBC: CPT | Performed by: PHYSICIAN ASSISTANT

## 2022-02-23 PROCEDURE — 83605 ASSAY OF LACTIC ACID: CPT | Performed by: PHYSICIAN ASSISTANT

## 2022-02-23 PROCEDURE — 71045 X-RAY EXAM CHEST 1 VIEW: CPT

## 2022-02-23 PROCEDURE — 87651 STREP A DNA AMP PROBE: CPT | Performed by: PHYSICIAN ASSISTANT

## 2022-02-23 PROCEDURE — 83735 ASSAY OF MAGNESIUM: CPT | Performed by: PHYSICIAN ASSISTANT

## 2022-02-23 PROCEDURE — 99285 EMERGENCY DEPT VISIT HI MDM: CPT | Performed by: PHYSICIAN ASSISTANT

## 2022-02-23 PROCEDURE — 80162 ASSAY OF DIGOXIN TOTAL: CPT | Performed by: PHYSICIAN ASSISTANT

## 2022-02-23 PROCEDURE — 84484 ASSAY OF TROPONIN QUANT: CPT | Performed by: PHYSICIAN ASSISTANT

## 2022-02-23 PROCEDURE — 80053 COMPREHEN METABOLIC PANEL: CPT | Performed by: PHYSICIAN ASSISTANT

## 2022-02-23 RX ORDER — BENZONATATE 100 MG/1
100 CAPSULE ORAL EVERY 8 HOURS
Qty: 21 CAPSULE | Refills: 0 | Status: SHIPPED | OUTPATIENT
Start: 2022-02-23 | End: 2022-05-20 | Stop reason: ALTCHOICE

## 2022-02-23 RX ORDER — AMOXICILLIN 500 MG/1
500 CAPSULE ORAL EVERY 12 HOURS SCHEDULED
Qty: 20 CAPSULE | Refills: 0 | Status: SHIPPED | OUTPATIENT
Start: 2022-02-23 | End: 2022-03-05

## 2022-02-23 RX ORDER — ACETAMINOPHEN 325 MG/1
650 TABLET ORAL ONCE
Status: COMPLETED | OUTPATIENT
Start: 2022-02-23 | End: 2022-02-23

## 2022-02-23 RX ORDER — ALBUTEROL SULFATE 90 UG/1
2 AEROSOL, METERED RESPIRATORY (INHALATION) ONCE
Status: COMPLETED | OUTPATIENT
Start: 2022-02-23 | End: 2022-02-23

## 2022-02-23 RX ORDER — MAGNESIUM HYDROXIDE/ALUMINUM HYDROXICE/SIMETHICONE 120; 1200; 1200 MG/30ML; MG/30ML; MG/30ML
30 SUSPENSION ORAL ONCE
Status: COMPLETED | OUTPATIENT
Start: 2022-02-23 | End: 2022-02-23

## 2022-02-23 RX ORDER — LIDOCAINE HYDROCHLORIDE 20 MG/ML
15 SOLUTION OROPHARYNGEAL ONCE
Status: COMPLETED | OUTPATIENT
Start: 2022-02-23 | End: 2022-02-23

## 2022-02-23 RX ORDER — AMOXICILLIN 250 MG/1
500 CAPSULE ORAL ONCE
Status: COMPLETED | OUTPATIENT
Start: 2022-02-23 | End: 2022-02-23

## 2022-02-23 RX ADMIN — ALBUTEROL SULFATE 2 PUFF: 90 AEROSOL, METERED RESPIRATORY (INHALATION) at 13:25

## 2022-02-23 RX ADMIN — AMOXICILLIN 500 MG: 250 CAPSULE ORAL at 13:43

## 2022-02-23 RX ADMIN — ALUMINUM HYDROXIDE, MAGNESIUM HYDROXIDE, AND SIMETHICONE 30 ML: 200; 200; 20 SUSPENSION ORAL at 12:56

## 2022-02-23 RX ADMIN — LIDOCAINE HYDROCHLORIDE 15 ML: 20 SOLUTION ORAL; TOPICAL at 12:56

## 2022-02-23 RX ADMIN — ACETAMINOPHEN 650 MG: 325 TABLET ORAL at 12:55

## 2022-02-23 NOTE — Clinical Note
Fidencio Patricalucian was seen and treated in our emergency department on 2/23/2022  Diagnosis:     Prince Ibrahim  is off the rest of the shift today, may return to work on return date  He may return on this date: 02/28/2022         If you have any questions or concerns, please don't hesitate to call        Tanner De La Rosa PA-C    ______________________________           _______________          _______________  Hospital Representative                              Date                                Time

## 2022-02-23 NOTE — ED PROVIDER NOTES
History  Chief Complaint   Patient presents with    Flu Symptoms     Covid + 3 weeks ago  Sore throat and cough for 2 weeks now  The patient is a 50-year-old male with a past medical history a on digoxin and Eliquis, hypertension, CHF presents emergency department chief complaint generalized, sore throat, sputum production worsening over the last 2-3 days  Patient states initially he tested positive for COVID-19 virus on 02/08 this month states sought testing after 2- 3 days of sore throat, and aches, similar today symptoms  He states that these symptoms were improving overall  He was using over-the-counter cold and flu medications with Tylenol and Motrin  The patient states that over the last 2-3 days, he has had increasing with yellow sputum production, sore throat and generalized weakness and body aches  Patient states this is similar to when he was initially ill with a virus  Patient denies any current smoking history of tobacco use  He denies any shortness of breath chest pain, palpitations, nausea vomiting abdominal pain  Patient states that earlier today he felt as though he had a fever that may have broke  States that his bed sheets were soaked with sweat  Patient had taken Tylenol Motrin prior to arrival   Patient states he did receive both Moderna vaccines but no booster prior to becoming ill with COVID-19        Flu Symptoms  Presenting symptoms: cough, fatigue, myalgias and sore throat    Presenting symptoms: no diarrhea, no fever, no nausea, no rhinorrhea, no shortness of breath and no vomiting    Cough:     Cough characteristics:  Productive    Sputum characteristics:  Yellow    Severity:  Moderate    Onset quality:  Gradual    Duration:  3 days    Timing:  Constant    Progression:  Worsening    Chronicity:  Recurrent  Fatigue:     Severity:  Moderate    Duration:  3 days    Timing:  Constant    Progression:  Worsening  Myalgias:     Location:  Generalized    Quality:  Aching Severity:  Moderate    Onset quality:  Gradual    Duration:  3 days    Timing:  Constant    Progression:  Worsening  Sore throat:     Severity:  Moderate    Onset quality:  Gradual    Timing:  Constant    Progression:  Worsening  Severity:  Moderate  Onset quality:  Gradual  Duration:  3 days  Progression:  Worsening  Chronicity:  New  Relieved by:  Nothing  Worsened by:  Nothing  Ineffective treatments:  OTC medications, rest and decongestant  Associated symptoms: no chills, no decreased appetite, no decrease in physical activity, no ear pain, no mental status change, no congestion, no neck stiffness and no syncope    Risk factors: no immunocompromised state        Prior to Admission Medications   Prescriptions Last Dose Informant Patient Reported? Taking?    apixaban (ELIQUIS) 5 mg   No No   Sig: Take 1 tablet (5 mg total) by mouth 2 (two) times a day   atorvastatin (LIPITOR) 40 mg tablet   No No   Sig: Take 1 tablet (40 mg total) by mouth daily with dinner   digoxin (LANOXIN) 0 125 mg tablet   No No   Sig: Take 1 tablet (125 mcg total) by mouth daily   diltiazem (CARDIZEM CD) 240 mg 24 hr capsule   No No   Sig: Take 1 capsule (240 mg total) by mouth daily   magnesium oxide (MAG-OX) 400 mg   No No   Sig: Take 1 tablet (400 mg total) by mouth 2 (two) times a day   metoprolol succinate (TOPROL-XL) 100 mg 24 hr tablet   No No   Sig: Take 1 tablet (100 mg total) by mouth 2 (two) times a day   rOPINIRole (REQUIP) 0 5 mg tablet   No No   Sig: Take 1 tablet (0 5 mg total) by mouth daily at bedtime      Facility-Administered Medications: None       Past Medical History:   Diagnosis Date    Atrial fibrillation (HCC)     Hyperlipidemia     Hypertension     Overweight     Systolic heart failure (HCC)     Tachycardia induced cardiomyopathy ([facility] [address] )        Past Surgical History:   Procedure Laterality Date    CARDIAC CATHETERIZATION  02/22/2018    CARDIOVERSION  03/2018    CARDIOVERSION  08/06/2020    INGUINAL HERNIA REPAIR      SINUS SURGERY      WISDOM TOOTH EXTRACTION         Family History   Problem Relation Age of Onset    No Known Problems Mother     Colon cancer Father      I have reviewed and agree with the history as documented  E-Cigarette/Vaping    E-Cigarette Use Never User      E-Cigarette/Vaping Substances     Social History     Tobacco Use    Smoking status: Former Smoker     Types: Cigarettes    Smokeless tobacco: Never Used   Vaping Use    Vaping Use: Never used   Substance Use Topics    Alcohol use: Not Currently     Comment: rare now, previously about 12 per week    Drug use: Never       Review of Systems   Constitutional: Positive for fatigue  Negative for chills, decreased appetite and fever  HENT: Positive for sore throat  Negative for congestion, ear pain and rhinorrhea  Eyes: Negative for pain and visual disturbance  Respiratory: Positive for cough  Negative for shortness of breath and stridor  Cardiovascular: Negative for chest pain and palpitations  Gastrointestinal: Negative for abdominal pain, diarrhea, nausea and vomiting  Genitourinary: Negative for dysuria and hematuria  Musculoskeletal: Positive for myalgias  Negative for arthralgias, back pain and neck stiffness  Skin: Negative for color change and rash  Neurological: Negative for seizures and speech difficulty  All other systems reviewed and are negative  Physical Exam  Physical Exam  Vitals and nursing note reviewed  Constitutional:       General: He is not in acute distress  Appearance: He is well-developed  HENT:      Head: Normocephalic and atraumatic  Nose: Nose normal       Mouth/Throat:      Mouth: Mucous membranes are moist       Pharynx: Uvula midline  Posterior oropharyngeal erythema present  No uvula swelling  Tonsils: No tonsillar abscesses  Eyes:      Extraocular Movements: Extraocular movements intact  Pupils: Pupils are equal, round, and reactive to light  Cardiovascular:      Rate and Rhythm: Normal rate and regular rhythm  Pulses: Normal pulses  Heart sounds: No murmur heard  Pulmonary:      Effort: Pulmonary effort is normal  No respiratory distress  Breath sounds: Examination of the right-lower field reveals decreased breath sounds  Examination of the left-lower field reveals decreased breath sounds  Decreased breath sounds present  Abdominal:      General: Bowel sounds are normal       Palpations: Abdomen is soft  Tenderness: There is no abdominal tenderness  There is no right CVA tenderness or left CVA tenderness  Musculoskeletal:      Cervical back: Normal range of motion  Right lower leg: No edema  Left lower leg: No edema  Skin:     General: Skin is warm and dry  Capillary Refill: Capillary refill takes less than 2 seconds  Neurological:      General: No focal deficit present  Mental Status: He is alert and oriented to person, place, and time  GCS: GCS eye subscore is 4  GCS verbal subscore is 5  GCS motor subscore is 6  Gait: Gait is intact     Psychiatric:         Behavior: Behavior normal          Vital Signs  ED Triage Vitals [02/23/22 1211]   Temperature Pulse Respirations Blood Pressure SpO2   99 3 °F (37 4 °C) 83 22 132/91 97 %      Temp Source Heart Rate Source Patient Position - Orthostatic VS BP Location FiO2 (%)   Temporal Monitor Sitting Right arm --      Pain Score       8           Vitals:    02/23/22 1211 02/23/22 1245   BP: 132/91 118/78   Pulse: 83 97   Patient Position - Orthostatic VS: Sitting          Visual Acuity      ED Medications  Medications   amoxicillin (AMOXIL) capsule 500 mg (has no administration in time range)   aluminum-magnesium hydroxide-simethicone (MYLANTA) oral suspension 30 mL (30 mL Oral Given 2/23/22 1256)   Lidocaine Viscous HCl (XYLOCAINE) 2 % mucosal solution 15 mL (15 mL Swish & Swallow Given 2/23/22 1256)   acetaminophen (TYLENOL) tablet 650 mg (650 mg Oral Given 2/23/22 1255)   albuterol (PROVENTIL HFA,VENTOLIN HFA) inhaler 2 puff (2 puffs Inhalation Given 2/23/22 1325)       Diagnostic Studies  Results Reviewed     Procedure Component Value Units Date/Time    COVID/FLU/RSV [[id number]]  (Abnormal) Collected: 02/23/22 1238    Lab Status: Final result Specimen: Nares from Nose Updated: 02/23/22 1331     SARS-CoV-2 Positive     INFLUENZA A PCR Negative     INFLUENZA B PCR Negative     RSV PCR Negative    Narrative:      FOR PEDIATRIC PATIENTS - copy/paste COVID Guidelines URL to browser: [email or web address]  [email or web address]    SARS-CoV-2 assay is a Nucleic Acid Amplification assay intended for the  qualitative detection of nucleic acid from SARS-CoV-2 in nasopharyngeal  swabs  Results are for the presumptive identification of SARS-CoV-2 RNA  Positive results are indicative of infection with SARS-CoV-2, the virus  causing COVID-19, but do not rule out bacterial infection or co-infection  with other viruses  Laboratories within the [place] and its  territories are required to report all positive results to the appropriate  public health authorities  Negative results do not preclude SARS-CoV-2  infection and should not be used as the sole basis for treatment or other  patient management decisions  Negative results must be combined with  clinical observations, patient history, and epidemiological information  This test has not been FDA cleared or approved  This test has been authorized by FDA under an Emergency Use Authorization  (EUA)  This test is only authorized for the duration of time the  declaration that circumstances exist justifying the authorization of the  emergency use of an in vitro diagnostic tests for detection of SARS-CoV-2  virus and/or diagnosis of COVID-19 infection under section 564(b)(1) of  the Act, 21 U  S C  393KCG-3(E)(7), unless the authorization is terminated  or revoked sooner   The test has been validated but independent review by FDA  and CLIA is pending  Test performed using [employer] GeneXpert: This RT-PCR assay targets N2,  a region unique to SARS-CoV-2  A conserved region in the E-gene was chosen  for pan-Sarbecovirus detection which includes SARS-CoV-2      Strep A PCR [[id number]]  (Abnormal) Collected: 02/23/22 1238    Lab Status: Final result Specimen: Throat Updated: 02/23/22 1318     STREP A PCR Detected    HS Troponin 0hr (reflex protocol) [[id number]]  (Normal) Collected: 02/23/22 1238    Lab Status: Final result Specimen: Blood from Arm, Right Updated: 02/23/22 1317     hs TnI 0hr 5 ng/L     Comprehensive metabolic panel [[id number]]  (Abnormal) Collected: 02/23/22 1238    Lab Status: Final result Specimen: Blood from Arm, Right Updated: 02/23/22 1315     Sodium 137 mmol/L      Potassium 3 5 mmol/L      Chloride 103 mmol/L      CO2 25 mmol/L      ANION GAP 9 mmol/L      BUN 13 mg/dL      Creatinine 1 19 mg/dL      Glucose 127 mg/dL      Calcium 8 3 mg/dL      Corrected Calcium 8 8 mg/dL      AST 18 U/L      ALT 28 U/L      Alkaline Phosphatase 94 U/L      Total Protein 7 6 g/dL      Albumin 3 4 g/dL      Total Bilirubin 0 53 mg/dL      eGFR 67 ml/min/1 73sq m     Narrative:      [place] guidelines for Chronic Kidney Disease (CKD):     Stage 1 with normal or high GFR (GFR > 90 mL/min/1 73 square meters)    Stage 2 Mild CKD (GFR = 60-89 mL/min/1 73 square meters)    Stage 3A Moderate CKD (GFR = 45-59 mL/min/1 73 square meters)    Stage 3B Moderate CKD (GFR = 30-44 mL/min/1 73 square meters)    Stage 4 Severe CKD (GFR = 15-29 mL/min/1 73 square meters)    Stage 5 End Stage CKD (GFR <15 mL/min/1 73 square meters)  Note: GFR calculation is accurate only with a steady state creatinine    Magnesium [[id number]]  (Normal) Collected: 02/23/22 1238    Lab Status: Final result Specimen: Blood from Arm, Right Updated: 02/23/22 1315     Magnesium 1 9 mg/dL     Digoxin level [[id number]]  (Abnormal) Collected: 02/23/22 1238    Lab Status: Final result Specimen: Blood from Arm, Right Updated: 02/23/22 1315     Digoxin Lvl 0 6 ng/mL     NT-BNP PRO [[id number]]  (Abnormal) Collected: 02/23/22 1238    Lab Status: Final result Specimen: Blood from Arm, Right Updated: 02/23/22 1315     NT-proBNP 1,470 pg/mL     Lactic acid [[id number]]  (Normal) Collected: 02/23/22 1238    Lab Status: Final result Specimen: Blood from Arm, Right Updated: 02/23/22 1306     LACTIC ACID 1 5 mmol/L     Narrative:      Result may be elevated if tourniquet was used during collection  CBC and differential [[id number]]  (Abnormal) Collected: 02/23/22 1238    Lab Status: Final result Specimen: Blood from Arm, Right Updated: 02/23/22 1247     WBC 17 56 Thousand/uL      RBC 5 02 Million/uL      Hemoglobin 15 8 g/dL      Hematocrit 45 5 %      MCV 91 fL      MCH 31 5 pg      MCHC 34 7 g/dL      RDW 11 9 %      MPV 10 6 fL      Platelets 584 Thousands/uL      nRBC 0 /100 WBCs      Neutrophils Relative 73 %      Immat GRANS % 1 %      Lymphocytes Relative 14 %      Monocytes Relative 11 %      Eosinophils Relative 1 %      Basophils Relative 0 %      Neutrophils Absolute 12 91 Thousands/µL      Immature Grans Absolute 0 09 Thousand/uL      Lymphocytes Absolute 2 53 Thousands/µL      Monocytes Absolute 1 86 Thousand/µL      Eosinophils Absolute 0 10 Thousand/µL      Basophils Absolute 0 07 Thousands/µL                  XR chest 1 view portable   Final Result by Hoa Hall MD (02/23 1319)      No acute cardiopulmonary disease  Probable small hiatus hernia  This was discussed with FLOYD Reich      In the setting of clinically suspected/proven COVID-19, this plain film appearance does not contain findings that raise concern for viral pneumonia such as COVID-19, but does not rule out this diagnosis                          Workstation performed: [id number]                    Procedures  ECG 12 Lead Documentation Only    Date/Time: 2/23/2022 12:45 PM  Performed by: Blanka Jackson PA-C  Authorized by: Blanka Jackson PA-C     Indications / Diagnosis:  Weakness  ECG reviewed by me, the ED Provider: yes    Patient location:  ED  Previous ECG:     Previous ECG:  Unavailable  Interpretation:     Interpretation: abnormal    Rate:     ECG rate:  105    ECG rate assessment: tachycardic    Rhythm:     Rhythm: atrial fibrillation    ST segments:     ST segments:  Non-specific  T waves:     T waves: non-specific               ED Course  ED Course as of 02/23/22 1341   Wed Feb 23, 2022   1326 STREP A PCR(!): Detected   1338 SARS-COV-2(!): Positive  Patient has been positive since 02/08, this was mainly ordered secondary to the other symptoms testing for influenza a B or RSV                                             MDM  Number of Diagnoses or Management Options     Amount and/or Complexity of Data Reviewed  Clinical lab tests: ordered and reviewed  Tests in the radiology section of CPT®: ordered and reviewed  Decide to obtain previous medical records or to obtain history from someone other than the patient: yes  Review and summarize past medical records: yes  Independent visualization of images, tracings, or specimens: yes    Risk of Complications, Morbidity, and/or Mortality  Presenting problems: moderate  Diagnostic procedures: moderate  Management options: moderate    Patient Progress  Patient progress: stable      Disposition  Final diagnoses:   Strep pharyngitis   COVID-19 virus infection     Time reflects when diagnosis was documented in both MDM as applicable and the Disposition within this note     Time User Action Codes Description Comment    2/23/2022  1:34 PM Eloisa Rowell Add [J02 0] Strep pharyngitis     2/23/2022  1:35 PM Eloisa Rowell Add [U07 1] COVID-19 virus infection       ED Disposition     ED Disposition Condition Date/Time Comment    Discharge Stable Wed Feb 23, 2022  1:36 PM Myla Valentin Antonia Vanessa discharge to home/self care  Follow-up Information     Follow up With Specialties Details Why Contact Юлия Valencia MD Family Medicine Call  As needed [address] [phone]            Patient's Medications   Discharge Prescriptions    AMOXICILLIN (AMOXIL) 500 MG CAPSULE    Take 1 capsule (500 mg total) by mouth every 12 (twelve) hours for 10 days       Start Date: 2/23/2022 End Date: 3/5/2022       Order Dose: 500 mg       Quantity: 20 capsule    Refills: 0    BENZONATATE (TESSALON PERLES) 100 MG CAPSULE    Take 1 capsule (100 mg total) by mouth every 8 (eight) hours       Start Date: 2/23/2022 End Date: --       Order Dose: 100 mg       Quantity: 21 capsule    Refills: 0       No discharge procedures on file      PDMP Review       Value Time User    PDMP Reviewed  Yes 9/30/2021 10:37 AM Hanna Jolly MD          ED Provider  Electronically Signed by           Taryn Chauhan PA-C  02/23/22 8062

## 2022-02-23 NOTE — DISCHARGE INSTRUCTIONS
You were found have strep infection of the throat  Please continue the antibiotics  We gave you an inhaler in the emergency department to help with your cough  Please use this 1-2 puffs every 4-6 hours as needed for cough  I would recommend doing this more routinely for the 1st day or so  You may continue over-the-counter Tylenol Motrin as needed  We have also prescribed you a cough suppressant  You were found to not have any pneumonia or infiltrate secondary to COVID

## 2022-02-23 NOTE — Clinical Note
Shelly Plummer was seen and treated in our emergency department on 2/23/2022  Diagnosis:     Vania Bergeron  is off the rest of the shift today, may return to work on return date  He may return on this date: 02/28/2022         If you have any questions or concerns, please don't hesitate to call        Alice Diana PA-C    ______________________________           _______________          _______________  Hospital Representative                              Date                                Time

## 2022-02-24 LAB
QRS AXIS: 34 DEGREES
QRSD INTERVAL: 88 MS
QT INTERVAL: 312 MS
QTC INTERVAL: 412 MS
T WAVE AXIS: 212 DEGREES
VENTRICULAR RATE: 105 BPM

## 2022-03-14 ENCOUNTER — TELEPHONE (OUTPATIENT)
Dept: NON INVASIVE DIAGNOSTICS | Facility: HOSPITAL | Age: 57
End: 2022-03-14

## 2022-03-14 ENCOUNTER — TELEPHONE (OUTPATIENT)
Dept: CARDIOLOGY CLINIC | Facility: CLINIC | Age: 57
End: 2022-03-14

## 2022-03-14 NOTE — TELEPHONE ENCOUNTER
Left detailed message for patient with the following information:  1)  Nothing to eat or drink after midnight on 3/15  You may take your morning medicines Wed morning prior to your arrival with a sip of water (emphasized taking Eliquis prior to arrival)  2)  Arrival time to the hospital is 7am   3)  You must have a  with you since you may not drive home after receiving anesthesia  4)  Asked him to bring his insurance card, photo ID and list of medicines with him

## 2022-03-14 NOTE — TELEPHONE ENCOUNTER
Received phone call form patient asking if blood work is needed for his procedure on Wednesday  Chart note does not say that blood work is needed  Call placed to cardiac lab for confirmation, spoke with Marlen Higuera who stated that no blood work is needed

## 2022-03-16 ENCOUNTER — HOSPITAL ENCOUNTER (OUTPATIENT)
Dept: NON INVASIVE DIAGNOSTICS | Facility: HOSPITAL | Age: 57
Discharge: HOME | End: 2022-03-16
Payer: COMMERCIAL

## 2022-03-16 ENCOUNTER — ANESTHESIA EVENT (OUTPATIENT)
Dept: NON INVASIVE DIAGNOSTICS | Facility: HOSPITAL | Age: 57
End: 2022-03-16

## 2022-03-16 ENCOUNTER — ANESTHESIA (OUTPATIENT)
Dept: NON INVASIVE DIAGNOSTICS | Facility: HOSPITAL | Age: 57
End: 2022-03-16

## 2022-03-16 VITALS
WEIGHT: 216 LBS | HEART RATE: 68 BPM | TEMPERATURE: 98 F | RESPIRATION RATE: 20 BRPM | DIASTOLIC BLOOD PRESSURE: 82 MMHG | OXYGEN SATURATION: 96 % | SYSTOLIC BLOOD PRESSURE: 120 MMHG | HEIGHT: 73 IN | BODY MASS INDEX: 28.63 KG/M2

## 2022-03-16 VITALS
OXYGEN SATURATION: 96 % | RESPIRATION RATE: 20 BRPM | DIASTOLIC BLOOD PRESSURE: 82 MMHG | HEART RATE: 70 BPM | SYSTOLIC BLOOD PRESSURE: 117 MMHG

## 2022-03-16 DIAGNOSIS — I48.0 PAROXYSMAL ATRIAL FIBRILLATION (HCC): ICD-10-CM

## 2022-03-16 LAB
ATRIAL RATE: 110 BPM
ATRIAL RATE: 69 BPM
P AXIS: 54 DEGREES
PR INTERVAL: 180 MS
QRS AXIS: 46 DEGREES
QRS AXIS: 63 DEGREES
QRSD INTERVAL: 88 MS
QRSD INTERVAL: 94 MS
QT INTERVAL: 336 MS
QT INTERVAL: 378 MS
QTC INTERVAL: 405 MS
QTC INTERVAL: 452 MS
SL CV LV EF: 40
T WAVE AXIS: 80 DEGREES
T WAVE AXIS: 80 DEGREES
VENTRICULAR RATE: 109 BPM
VENTRICULAR RATE: 69 BPM

## 2022-03-16 PROCEDURE — 93312 ECHO TRANSESOPHAGEAL: CPT | Performed by: INTERNAL MEDICINE

## 2022-03-16 PROCEDURE — 93005 ELECTROCARDIOGRAM TRACING: CPT

## 2022-03-16 PROCEDURE — 93325 DOPPLER ECHO COLOR FLOW MAPG: CPT | Performed by: INTERNAL MEDICINE

## 2022-03-16 PROCEDURE — 93010 ELECTROCARDIOGRAM REPORT: CPT | Performed by: INTERNAL MEDICINE

## 2022-03-16 PROCEDURE — 93312 ECHO TRANSESOPHAGEAL: CPT

## 2022-03-16 PROCEDURE — 92960 CARDIOVERSION ELECTRIC EXT: CPT

## 2022-03-16 PROCEDURE — 92960 CARDIOVERSION ELECTRIC EXT: CPT | Performed by: INTERNAL MEDICINE

## 2022-03-16 PROCEDURE — 93321 DOPPLER ECHO F-UP/LMTD STD: CPT | Performed by: INTERNAL MEDICINE

## 2022-03-16 RX ORDER — SODIUM CHLORIDE, SODIUM LACTATE, POTASSIUM CHLORIDE, CALCIUM CHLORIDE 600; 310; 30; 20 MG/100ML; MG/100ML; MG/100ML; MG/100ML
INJECTION, SOLUTION INTRAVENOUS CONTINUOUS PRN
Status: DISCONTINUED | OUTPATIENT
Start: 2022-03-16 | End: 2022-03-16

## 2022-03-16 RX ORDER — PROPOFOL 10 MG/ML
INJECTION, EMULSION INTRAVENOUS AS NEEDED
Status: DISCONTINUED | OUTPATIENT
Start: 2022-03-16 | End: 2022-03-16

## 2022-03-16 RX ORDER — FENTANYL CITRATE/PF 50 MCG/ML
25 SYRINGE (ML) INJECTION
Status: DISCONTINUED | OUTPATIENT
Start: 2022-03-16 | End: 2022-03-17 | Stop reason: HOSPADM

## 2022-03-16 RX ORDER — METOCLOPRAMIDE HYDROCHLORIDE 5 MG/ML
10 INJECTION INTRAMUSCULAR; INTRAVENOUS ONCE AS NEEDED
Status: DISCONTINUED | OUTPATIENT
Start: 2022-03-16 | End: 2022-03-17 | Stop reason: HOSPADM

## 2022-03-16 RX ORDER — PROPOFOL 10 MG/ML
INJECTION, EMULSION INTRAVENOUS CONTINUOUS PRN
Status: DISCONTINUED | OUTPATIENT
Start: 2022-03-16 | End: 2022-03-16

## 2022-03-16 RX ORDER — CALCIUM CHLORIDE 100 MG/ML
INJECTION INTRAVENOUS; INTRAVENTRICULAR AS NEEDED
Status: DISCONTINUED | OUTPATIENT
Start: 2022-03-16 | End: 2022-03-16

## 2022-03-16 RX ORDER — KETAMINE HCL IN NACL, ISO-OSM 100MG/10ML
SYRINGE (ML) INJECTION AS NEEDED
Status: DISCONTINUED | OUTPATIENT
Start: 2022-03-16 | End: 2022-03-16

## 2022-03-16 RX ORDER — LIDOCAINE HYDROCHLORIDE 20 MG/ML
INJECTION, SOLUTION EPIDURAL; INFILTRATION; INTRACAUDAL; PERINEURAL AS NEEDED
Status: DISCONTINUED | OUTPATIENT
Start: 2022-03-16 | End: 2022-03-16

## 2022-03-16 RX ORDER — ONDANSETRON 2 MG/ML
4 INJECTION INTRAMUSCULAR; INTRAVENOUS ONCE AS NEEDED
Status: DISCONTINUED | OUTPATIENT
Start: 2022-03-16 | End: 2022-03-17 | Stop reason: HOSPADM

## 2022-03-16 RX ADMIN — SODIUM CHLORIDE, SODIUM LACTATE, POTASSIUM CHLORIDE, AND CALCIUM CHLORIDE: .6; .31; .03; .02 INJECTION, SOLUTION INTRAVENOUS at 08:10

## 2022-03-16 RX ADMIN — Medication 30 MG: at 08:21

## 2022-03-16 RX ADMIN — PROPOFOL 100 MCG/KG/MIN: 10 INJECTION, EMULSION INTRAVENOUS at 08:23

## 2022-03-16 RX ADMIN — PERFLUTREN 0.4 ML/MIN: 6.52 INJECTION, SUSPENSION INTRAVENOUS at 08:52

## 2022-03-16 RX ADMIN — CALCIUM CHLORIDE 0.25 G: 100 INJECTION PARENTERAL at 08:21

## 2022-03-16 RX ADMIN — Medication 10 MG: at 08:26

## 2022-03-16 RX ADMIN — LIDOCAINE HYDROCHLORIDE 100 MG: 20 INJECTION, SOLUTION EPIDURAL; INFILTRATION; INTRACAUDAL; PERINEURAL at 08:21

## 2022-03-16 RX ADMIN — PROPOFOL 80 MG: 10 INJECTION, EMULSION INTRAVENOUS at 08:21

## 2022-03-16 RX ADMIN — Medication 10 MG: at 08:34

## 2022-03-16 NOTE — ANESTHESIA PREPROCEDURE EVALUATION
Procedure:  KAYLYN    Relevant Problems   CARDIO   (+) Benign essential HTN   (+) Chronic systolic congestive heart failure (HCC)   (+) Mixed hyperlipidemia   (+) Paroxysmal atrial fibrillation (HCC)      /RENAL   (+) Stage 3b chronic kidney disease (HCC)      Other   (+) Abnormal TSH   (+) Alcoholic cardiomyopathy (HCC)   (+) Cardiomyopathy (HCC)   (+) Left atrial thrombus        Physical Exam    Airway    Mallampati score: I  TM Distance: >3 FB  Neck ROM: full     Dental   No notable dental hx     Cardiovascular  Cardiovascular exam normal    Pulmonary  Pulmonary exam normal     Other Findings        Anesthesia Plan  ASA Score- 3     Anesthesia Type- IV sedation with anesthesia with ASA Monitors  Additional Monitors:   Airway Plan:           Plan Factors-Exercise tolerance (METS): >4 METS  Chart reviewed  EKG reviewed  Imaging results reviewed  Existing labs reviewed  Patient summary reviewed  Patient is not a current smoker  Induction- intravenous  Postoperative Plan-     Informed Consent- Anesthetic plan and risks discussed with patient and spouse  I personally reviewed this patient with the CRNA  Discussed and agreed on the Anesthesia Plan with the CRNA  Melissa Dallas

## 2022-03-16 NOTE — H&P
H AND P - Cardiology   Jigna Arboleda 61 y o  male MRN: [id number]  Unit/Bed#:  Encounter: [id number]          Assessment:    D/w pt risks benefits and alternatives to gertrude/ecv possible definity and he agreees to proceed  D/w dr Jennifer Barreto here as well    History of Present Illness   HPI: Jigna Arboleda is a 61y o  year old male who has a history of   PAF    follows with cardiologist Dr Helga Fontenot           he presents with A F FOR GERTRUDE/ECV    PREVIOUS OFFICE NOTE HERE:  Assessment & Plan:  History of atrial fibrillation in 2018 for which he underwent cardioversion 03/2018  Tachycardia induced cardiomyopathy noted at that time with an EF of 20% which subsequently improved after cardioversion to 62%  Patient underwent repeat GERTRUDE guided cardioversion 08/06/2020 with successful conversion to normal sinus rhythm  Patient was started on metoprolol succinate 50 mg twice daily, Amiodarone, and Eliquis  Unfortunately he was lost to follow up secondary to lack of insurance  He presented back to [address] [place] [address] on 9/24/2021 in A fib with RVR  GERTRUDE performed on 9/29 in preparation for cardioversion, however, left atrial appendage thrombus identified and cardioversion was aborted  Estimated EF during GERTRUDE at 25-30%  He remained on Eliquis 5 mg BID uninterrupted and GERTRUDE was performed 11/10/2021 with continued evidence of MOLLY thrombus  Cardioversion was aborted  Continue digoxin 0 125mg daily, diltiazem CD 240mg daily, and metoprolol succinate 100mg BID for rate control  Will orderTEE/Cardioversion again  I would have a low threshold for EP evaluation for ablation if he has recurrent atrial fibrillation given his history of tachycardia induced cardiomyopathy  Pt has been abstinent from alcohol intake and I applauded his efforts  Ordered sleep study and nuclear stress test due to presence of a fib for evaluation for ALVARO and ischemic heart disease  Close follow up scheduled      Orders:  -     apixaban (ELIQUIS) 5 mg;  Take 1 tablet (5 mg total) by mouth 2 (two) times a day  -     KAYLYN; Future; Expected date: 01/10/2022  -     Cardioversion; Future; Expected date: 01/10/2022  -     CBC and differential; Future  -     Comprehensive metabolic panel  -     Home Study; Future  -     NM myocardial perfusion spect (stress and/or rest); Future; Expected date: 01/10/2022       ROS   Negative for new complkaints  Historical Information   Past Medical History:   Diagnosis Date    Atrial fibrillation ([facility] [facility] )     Hyperlipidemia     Hypertension     Overweight     Systolic heart failure (HCC)     Tachycardia induced cardiomyopathy ([facility] )      Past Surgical History:   Procedure Laterality Date    CARDIAC CATHETERIZATION  02/22/2018    CARDIOVERSION  03/2018    CARDIOVERSION  08/06/2020    INGUINAL HERNIA REPAIR      SINUS SURGERY      WISDOM TOOTH EXTRACTION       Social History     Substance and Sexual Activity   Alcohol Use Not Currently    Comment: rare now, previously about 12 per week     Social History     Substance and Sexual Activity   Drug Use Never     Social History     Tobacco Use   Smoking Status Former Smoker    Types: Cigarettes   Smokeless Tobacco Never Used     Family History: non-contributory    Meds/Allergies   current meds:   No current facility-administered medications for this encounter  and PTA meds:   Cannot display prior to admission medications because the patient has not been admitted in this contact  No current facility-administered medications for this encounter  No Known Allergies    Objective   Vitals: Blood pressure 124/97, pulse 101, temperature 98 1 °F (36 7 °C), temperature source Oral, resp  rate 20, height 6' 1" (1 854 m), weight 95 3 kg (210 lb), SpO2 99 %  , Body mass index is 27 71 kg/m² ,   Orthostatic Blood Pressures      Most Recent Value   Blood Pressure 124/97 filed at 03/16/2022 5779        Systolic (76WEA), KRM:086 , Min:124 , KHN:807     Diastolic (36JQF), UIJ:76, Min:92, Max:97    No intake or output data in the 24 hours ending 03/16/22 0801    Weight (last 2 days)     Date/Time Weight    03/16/22 0727 95 3 (210)          Invasive Devices  Report    Peripheral Intravenous Line            Peripheral IV 03/16/22 Dorsal (posterior); Right Hand <1 day                  Physical Exam    Physical Exam  Vitals and nursing note reviewed  Constitutional:       General: Patient is not in acute distress  Appearance: Normal appearance  Patient is obese  He is not ill-appearing or diaphoretic  HENT:      Head: Normocephalic and atraumatic  Nose: Nose normal       Mouth/Throat:      Mouth: Mucous membranes are moist    Eyes:      Extraocular Movements: Extraocular movements intact  Conjunctiva/sclera: Conjunctivae normal       Pupils: Pupils are equal, round, and reactive to light  Neck:      Vascular: No carotid bruit or JVD  Supple,no adenopathy  Cardiovascular:      Rate and Rhythm: irreg irregular  Pulses: Normal pulses  Heart sounds: Normal heart sounds  No murmur heard  No friction rub  No gallop  Pulmonary:      Effort: Pulmonary effort is normal  No respiratory distress  Breath sounds: Normal breath sounds  No wheezing, rhonchi or rales  Abdominal:      General: Abdomen is flat  Bowel sounds are normal       Palpations: Abdomen is soft  Musculoskeletal:         General: Normal range of motion  Cervical back: Normal range of motion and neck supple  No tenderness  Right lower leg: No edema  Left lower leg: No edema  Lymphadenopathy:      Cervical: No cervical adenopathy  Skin:     General: Skin is warm and dry  Coloration: Skin is not jaundiced  Findings: No bruising or rash  Neurological:      General: No focal deficit present  Mental Status: He is alert and oriented to person, place, and time  Mental status is at baseline  Motor: No weakness        Coordination: Coordination normal       Gait: Gait normal  Laboratory Results:        CBC with diff:       Invalid input(s):  RBC, TOTALCELLSCOUNTED, SEGS%, GRANS%, LYMPHS%, EOS%, BASO%, ABNEUT, ABGRANS, ABLYMPHS, ABMOMOS, ABEOS, ABBASO      CMP:      Invalid input(s): ALBUMIN      BMP:      Invalid input(s): LABGLOM    BNP:  No results for input(s): BNP in the last 72 hours  Magnesium:       Coags:       TSH:       Hemoglobin A1C       Lipid Profile:         Cardiac testing:   Results for orders placed during the hospital encounter of 20    Echo complete with contrast if indicated    Narrative  [facility] [address]    Transthoracic Echocardiogram  2D, M-mode, Doppler, and Color Doppler    Study date:  05-Aug-2020    Patient: David Baird  MR number: [id number]  Account number: [de-identified]  : 1965  Age: 63 years  Gender: Male  Status: Inpatient  Location: [facility] Lab  Height: 72 in  Weight: 200 lb  BP: 137/ 97 mmHg    Indications: Atrial Fibrillation    Diagnoses: I48 0 - Atrial fibrillation    Sonographer:  MELONY Magallanes  Primary Physician:  Roel Vera DO  Referring Physician:  Blanka Rangel MD  Group:  Elan Cho  Interpreting Physician:  Galindo Rausch DO    SUMMARY    LEFT VENTRICLE:  The ventricle was mildly dilated  Systolic function was moderately reduced  Ejection fraction was estimated in the range of 25 % to 30 %  There was diffuse hypokinesis  Wall thickness was at the upper limits of normal   Left ventricular diastolic function not assessed due to atrial fibrillation  RIGHT VENTRICLE:  The ventricle was dilated  Systolic function was reduced  TAPSE 1 3  LEFT ATRIUM:  The atrium was moderately dilated  RIGHT ATRIUM:  The atrium was moderately dilated  MITRAL VALVE:  There was moderate regurgitation  The regurgitant jet was centrally directed  TRICUSPID VALVE:  There was mild to moderate regurgitation    Estimated peak PA pressure was 35 mmHg  Assuming a right atrial pressure of 15 mmHg  IVC, HEPATIC VEINS:  The inferior vena cava was dilated  Respirophasic changes in dimension were absent  COMPARISONS:  Compared to limited echocardiogram 8/22/2018, the EF is signicantly lower  Previous EF 62 5%  HISTORY: PRIOR HISTORY: AFIB, CHF, CMP, Elevated troponin, H/O MOLLY thrombus    PROCEDURE: The study was performed in the [facility] Echo Lab  This was a routine study  The transthoracic approach was used  The study included complete 2D imaging, M-mode, complete spectral Doppler, and color Doppler  The heart  rate was 89 bpm, at the start of the study  Images were obtained from the parasternal, apical, subcostal, and suprasternal notch acoustic windows  Image quality was good  LEFT VENTRICLE: The ventricle was mildly dilated  Systolic function was moderately reduced  Ejection fraction was estimated in the range of 25 % to 30 %  There was diffuse hypokinesis  Wall thickness was at the upper limits of normal  No  evidence of apical thrombus  DOPPLER: Left ventricular diastolic function not assessed due to atrial fibrillation  RIGHT VENTRICLE: The ventricle was dilated  Systolic function was reduced  TAPSE 1 3  LEFT ATRIUM: The atrium was moderately dilated  RIGHT ATRIUM: The atrium was moderately dilated  MITRAL VALVE: The annulus was dilated  There was minimal thickening  There was normal leaflet separation  DOPPLER: The transmitral velocity was within the normal range  There was no evidence for stenosis  There was moderate regurgitation  The regurgitant jet was centrally directed  AORTIC VALVE: The valve was trileaflet  Leaflets exhibited normal thickness and normal cuspal separation  DOPPLER: Transaortic velocity was within the normal range  There was no evidence for stenosis  There was no significant  regurgitation  TRICUSPID VALVE: The valve structure was normal  There was normal leaflet separation  DOPPLER: The transtricuspid velocity was within the normal range  There was no evidence for stenosis  There was mild to moderate regurgitation  Estimated  peak PA pressure was 35 mmHg  Assuming a right atrial pressure of 15 mmHg  PULMONIC VALVE: Leaflets exhibited normal thickness, no calcification, and normal cuspal separation  DOPPLER: The transpulmonic velocity was within the normal range  There was no significant regurgitation  PERICARDIUM: There was no pericardial effusion  The pericardium was normal in appearance  AORTA: The root exhibited normal size  SYSTEMIC VEINS: IVC: The inferior vena cava was dilated  Respirophasic changes in dimension were absent  SYSTEM MEASUREMENT TABLES    2D  %FS: 11 48 %  AV Diam: 3 25 cm  EDV(Teich): 134 19 ml  EF(Teich): 24 69 %  ESV(Teich): 101 06 ml  IVSd: 1 09 cm  LA Diam: 4 74 cm  LAAs A4C: 26 97 cm2  LAESV A-L A4C: 84 97 ml  LAESV MOD A4C: 80 02 ml  LALs A4C: 7 26 cm  LVEDV MOD A4C: 121 94 ml  LVEF MOD A4C: 20 57 %  LVESV MOD A4C: 96 85 ml  LVIDd: 5 28 cm  LVIDs: 4 67 cm  LVLd A4C: 8 07 cm  LVLs A4C: 7 51 cm  LVPWd: 1 14 cm  RAAs A4C: 21 94 cm2  RAESV A-L: 72 45 ml  RAESV MOD: 70 61 ml  RALs: 5 64 cm  RVIDd: 4 01 cm  RWT: 0 43  SV MOD A4C: 25 09 ml  SV(Teich): 33 13 ml    CF  MR Als  Hakan: 0 39 m/s  MR Flow: 19 76 ml/s  MR Rad: 0 28 cm    CW  MR VTI: 126 65 cm  MR Vmax: 4 01 m/s  TR Vmax: 2 29 m/s  TR maxP 95 mmHg    MM  TAPSE: 1 33 cm    PW  E' Lat: 0 14 m/s  MR ERO: 0 05 cm2  MR RV: 6 25 ml    [employer] Accredited Echocardiography Laboratory    Prepared and electronically signed by    Subha Chatterjee DO  Signed 05-Aug-2020 12:18:36    Results for orders placed during the hospital encounter of 21    KAYLYN (order if scheduling before 10/11/21)    [facility]  [employer] [place], [place] [address]    Transesophageal Echocardiogram  2D, 3D, Doppler, and Color Doppler    Study date: 28-Sep-2021    Patient: Emir Boyer  MR number: [id number]  Account number: [de-identified]  : 1965  Age: 61 years  Gender: Male  Status: Inpatient  Location: [facility]  Height: 72 in  Weight: 204 lb  BP: 115/ 76 mmHg    Indications: Atrial Fibrillation    Diagnoses: I48 0 - Atrial fibrillation    Sonographer:  MELONY Martinez  Primary Physician:  Andres Anderson MD  Referring Physician:  Mishel Jackson DO  Group:  Dana Ellis  Interpreting Physician:  Mishel Jackson DO    IMPRESSIONS:  Planned cardioversion was aborted due to possible MOLLY thrombus  SUMMARY    LEFT VENTRICLE:  The ventricle was mildly dilated  Systolic function was severely reduced  Ejection fraction was estimated in the range of 25 % to 30 %  There was severe diffuse hypokinesis  LEFT ATRIUM:  The atrium was dilated  There was evidence of spontaneous echo contrast ("smoke")  LEFT ATRIAL APPENDAGE:  There was a possible mass  There was evidence of spontaneous echo contrast ("smoke") in the appendage  ATRIAL SEPTUM:  No defect or patent foramen ovale was identified  MITRAL VALVE:  There was mild regurgitation  TRICUSPID VALVE:  There was trace to mild regurgitation  HISTORY: PRIOR HISTORY: CKD, AFIB, CMP, CHF    PROCEDURE: The study was performed in the [facility]  This was a routine study  The risks and alternatives of the procedure were explained to the patient and informed consent was obtained  The transesophageal approach was  used  The study included complete 2D imaging, 3D imaging, complete spectral Doppler, and color Doppler  The heart rate was 99 bpm, at the start of the study  An adult omniplane probe was inserted by the attending cardiologist  Images were  obtained from the parasternal, apical, subcostal, and suprasternal notch acoustic windows  Intubated with ease  One intubation attempt(s)  There was no blood detected on the probe   Image quality was adequate  There were no complications  during the procedure  LEFT VENTRICLE: The ventricle was mildly dilated  Systolic function was severely reduced  Ejection fraction was estimated in the range of 25 % to 30 %  There was severe diffuse hypokinesis  No evidence of apical thrombus  RIGHT VENTRICLE: The size was normal  Systolic function was normal     LEFT ATRIUM: The atrium was dilated  There was evidence of spontaneous echo contrast ("smoke")  APPENDAGE: The size was normal  There was a possible mass  There was evidence of spontaneous echo contrast ("smoke") in the appendage  ATRIAL SEPTUM: No defect or patent foramen ovale was identified  RIGHT ATRIUM: Size was normal     MITRAL VALVE: Valve structure was normal  There was normal leaflet separation  DOPPLER: The transmitral velocity was within the normal range  There was no evidence for stenosis  There was mild regurgitation  AORTIC VALVE: The valve was trileaflet  Leaflets exhibited normal thickness and normal cuspal separation  DOPPLER: Transaortic velocity was within the normal range  There was no evidence for stenosis  There was no significant  regurgitation  TRICUSPID VALVE: The valve structure was normal  There was normal leaflet separation  DOPPLER: There was trace to mild regurgitation  PULMONIC VALVE: Not well visualized  DOPPLER: The transpulmonic velocity was within the normal range  There was no significant regurgitation  AORTA: The root exhibited normal size  [facility]    Prepared and electronically signed by    Suly Abreu DO  Signed 29-Sep-2021 15:14:04    No results found for this or any previous visit  No results found for this or any previous visit  Imaging: I have personally reviewed pertinent reports  XR chest 1 view portable    Result Date: 2/23/2022  Narrative: CHEST INDICATION:   weakness, cough   History of Covid 19   2 weeks ago COMPARISON:  9/24/2021; 8/4/2020 EXAM PERFORMED/VIEWS:  XR CHEST PORTABLE FINDINGS:  Probable small hiatus hernia is present and appears to be gastric lumen protruding slightly beyond the diaphragm is similar to the study of 924  Some linear atelectasis appears to be present left lung base also similar to some of the prior studies  Cardiomediastinal silhouette appears unremarkable  The lungs are clear  No pneumothorax or pleural effusion  Osseous structures appear within normal limits for patient age  Impression: No acute cardiopulmonary disease  Probable small hiatus hernia  This was discussed with FLOYD Anaya In the setting of clinically suspected/proven COVID-19, this plain film appearance does not contain findings that raise concern for viral pneumonia such as COVID-19, but does not rule out this diagnosis   Workstation performed: [id number]             Code Status: Prior

## 2022-03-16 NOTE — Clinical Note
Patient to [address] via litter with CRNA escort   Report given to [facility], bedside nurse

## 2022-03-16 NOTE — ANESTHESIA POSTPROCEDURE EVALUATION
Post-Op Assessment Note    CV Status:  Stable    Pain management: adequate     Mental Status:  Awake   Hydration Status:  Stable   PONV Controlled:  Controlled   Airway Patency:  Patent      Post Op Vitals Reviewed: Yes      Staff: Anesthesiologist, CRNA         No complications documented      /88 (03/16/22 0834)    Temp   98   Pulse (!) 124 (03/16/22 0834)   Resp 16 (03/16/22 0834)    SpO2 99 % (03/16/22 0834)

## 2022-03-22 ENCOUNTER — TELEPHONE (OUTPATIENT)
Dept: CARDIOLOGY CLINIC | Facility: CLINIC | Age: 57
End: 2022-03-22

## 2022-03-22 NOTE — LETTER
March 22, 2022     Patient: Emely Reyes   YOB: 1965   Date of Visit: 3/22/2022       To Whom it May Concern:    Shabnam Veliz is under my professional care  He was treated at our facility 3/16/2022  He may return to work with limitations 3/18/2022  If you have any questions or concerns, please don't hesitate to call           Sincerely,            Sofia CORNEJO      CC: No Recipients

## 2022-03-22 NOTE — TELEPHONE ENCOUNTER
Received phone call form patient with his employer on speaker phone  Patient and employer want to know when patient can return to work  Patient states that Dr Antoinette Bergeron told him not until the 23 th? But states that he was not given anything in witting   Please advise

## 2022-04-19 ENCOUNTER — OFFICE VISIT (OUTPATIENT)
Dept: CARDIOLOGY CLINIC | Facility: CLINIC | Age: 57
End: 2022-04-19
Payer: COMMERCIAL

## 2022-04-19 VITALS
DIASTOLIC BLOOD PRESSURE: 70 MMHG | TEMPERATURE: 95 F | BODY MASS INDEX: 28.84 KG/M2 | HEART RATE: 106 BPM | SYSTOLIC BLOOD PRESSURE: 122 MMHG | WEIGHT: 217.6 LBS | HEIGHT: 73 IN

## 2022-04-19 DIAGNOSIS — E78.2 MIXED HYPERLIPIDEMIA: ICD-10-CM

## 2022-04-19 DIAGNOSIS — I48.0 PAROXYSMAL ATRIAL FIBRILLATION (HCC): Primary | ICD-10-CM

## 2022-04-19 DIAGNOSIS — I50.22 CHRONIC SYSTOLIC CONGESTIVE HEART FAILURE (HCC): ICD-10-CM

## 2022-04-19 DIAGNOSIS — I10 BENIGN ESSENTIAL HTN: ICD-10-CM

## 2022-04-19 DIAGNOSIS — I42.9 CARDIOMYOPATHY, UNSPECIFIED TYPE (HCC): ICD-10-CM

## 2022-04-19 PROBLEM — I51.3 LEFT ATRIAL THROMBUS: Status: RESOLVED | Noted: 2021-09-28 | Resolved: 2022-04-19

## 2022-04-19 PROCEDURE — 93000 ELECTROCARDIOGRAM COMPLETE: CPT | Performed by: INTERNAL MEDICINE

## 2022-04-19 PROCEDURE — 99214 OFFICE O/P EST MOD 30 MIN: CPT | Performed by: INTERNAL MEDICINE

## 2022-04-19 RX ORDER — METOPROLOL SUCCINATE 25 MG/1
25 TABLET, EXTENDED RELEASE ORAL 2 TIMES DAILY
Qty: 30 TABLET | Refills: 11 | Status: SHIPPED | OUTPATIENT
Start: 2022-04-19 | End: 2022-06-29

## 2022-04-19 NOTE — LETTER
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

## 2022-04-19 NOTE — PATIENT INSTRUCTIONS
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

## 2022-04-19 NOTE — PROGRESS NOTES
Cardiology Office Visit    Stephanie Pena  [id number]  1965    [facility]  [address]  [facility] [address] [phone]      Dear Nneka No MD,    I had the pleasure of seeing your patient at our [address] [facility] office today  As you know he is a pleasant 61y o  year old male with a medical history as described below  Reason for office visit: Follow up atrial fibrillation, cardiomyopathy, chronic systolic congestive heart failure, hypertension and hyperlipidemia  1  Paroxysmal atrial fibrillation [facility])  Assessment & Plan:  History of atrial fibrillation in 2018 for which he underwent cardioversion 03/2018  Tachycardia induced cardiomyopathy noted at that time with an EF of 20% which subsequently improved after cardioversion to 62%  Patient underwent repeat KAYLYN guided cardioversion 08/06/2020 with successful conversion to normal sinus rhythm  Floresmirella Villegas He presented back to [address] [place] [address] on 9/24/2021 in A fib with RVR  AKYLYN 9/29/21 showed left atrial appendage thrombus and cardioversion was aborted  Estimated EF during KAYLYN at 25-30%  He remained on Eliquis 5 mg BID uninterrupted and KAYLYN was performed 11/10/2021 and 2/2/2022 with continued evidence of MOLLY thrombus  Cardioversion was aborted x 2  Reviewed last KAYLYN images with Dr Chelsea Gallego who felt artifact was present without thrombus  Successful cardioversion 3/16/2022  ECG today 4/19/2022 shows atrial fibrillation with RVR ( bpm)  Continue digoxin 0 125mg daily, diltiazem  mg daily, and metoprolol succinate but increase from 100 mg BID to 125 mg BID for rate control  Patient has been abstinent from alcohol intake and I applauded his efforts  Sleep study and nuclear stress test ordered but not yet done    I have recommend EP evaluation for ablation (or antiarrhythmics but favor ablation) given his recurrent atrial fibrillation and presumed tachycardia induced cardiomyopathy  Orders:  -     POCT ECG  -     Ambulatory referral to Cardiac Electrophysiology; Future  -     metoprolol succinate (TOPROL-XL) 25 mg 24 hr tablet; Take 1 tablet (25 mg total) by mouth 2 (two) times a day In addition to the 100 mg tablets for a total of 125 mg twice daily    2  Cardiomyopathy, unspecified type [facility])  Assessment & Plan:  Patient with prior history of tachycardia induced cardiomyopathy with prior EF of 20% that improved to 62% post cardioversion in 2018  EF 8/5/2020 25-30%  KAYLYN 9/29/2021 with EF 25-30%  KAYLYN 3/16/2022 EF 40%  There could also be some component of ETOH induced cardiomyopathy  Continue cardiomyopathy specific metoprolol succinate but increase 100mg BID to 125 mg BID  ACE-I/ARB held in hospital due to CASEY  Labs remain unchanged on recent blood work  Will add ACE-I/ARB if renal function improved  Updated blood work ordered  Can consider cardiac MRI if LVEF does not improve  Orders:  -     Ambulatory referral to Cardiac Electrophysiology; Future    3  Chronic systolic congestive heart failure (HCC)  Assessment & Plan:    Wt Readings from Last 3 Encounters:   04/19/22 98 7 kg (217 lb 9 6 oz)   03/16/22 98 kg (216 lb)   02/23/22 98 1 kg (216 lb 4 3 oz)     Patient with prior history of tachycardia induced cardiomyopathy with prior EF of 20% that improved to 62% post cardioversion in 2018  EF 8/5/2020 25-30%  EF 40% on KAYLYN 3/16/2022  No evidence of volume overload during recent hospitalization or at follow up today  Not on diuretic therapy at present  Reviewed 2g sodium diet, 2L fluid restriction, daily weights  He will report 3 pound weight gain in 1 day/5 pound weight gain in 1 week, shortness of breath, leg swelling, or any other concerns  Will continue to monitor  4  Benign essential HTN    5   Mixed hyperlipidemia           HPI   Patient presents for follow-up of atrial fibrillation as well as chronic systolic heart failure and presumed tachycardia induced cardiomyopathy  Patient presented 08/04/2020 after he presented to his primary provider's office after sustaining a fall approximately a week prior  Patient was noted to have a rapid irregular heartbeat on exam and was sent to the emergency room for further evaluation  Upon arrival to the emergency room patient was noted to be in rapid atrial fibrillation  Patient has a known history of atrial fibrillation and was previously on Eliquis anticoagulation as well as amiodarone, metoprolol and statin therapy for few months but then stop them due to loss of insurance  Troponin was initially noted to be 0 15  NT proBNP 2,786  The patient had atrial fibrillation in 2018 at which time he underwent cardioversion 03/2018 and was started on amiodarone  The patient was thought to have a tachycardia induced cardiomyopathy with an ejection fraction of 20%  He did undergo cardiac catheterization 02/22/2018 which showed normal coronary arteries  Repeat echocardiogram 08/2018 showed ejection fraction of 62%  Patient underwent KAYLYN guided cardioversion 08/06/2020 with successful conversion to normal sinus rhythm  Patient was started on metoprolol succinate 50 mg twice daily  He was re-loaded with amiodarone and discharged home on 200 mg daily  We discussed the need for alcohol cessation  We discussed there would be a low threshold to proceed with ablation given his recurrent heart failure in the setting of atrial fibrillation  We also discussed the need for an eventual sleep study  Unfortunately the patient does not have any insurance at this time  Echocardiogram 08/05/2020 showed ejection fraction of 25-30% but difficult to assess with underlying atrial fibrillation with rapid ventricular rates  Patient was noted to have elevated troponin with a flat trend during his hospitalization  Peak of 0 15  TSH was low       * Patient was admitted to [address] [place] [address] 9/24-9/30/2021 for rapid atrial fibrillation  KAYLYN guided cardioversion was planned but had to be aborted due to possible MOLLY thrombus  Medications were adjusted as an outpatient to try to obtain better heart rate control  * KAYLYN planned cardioversion 11/10/2021 concerning for possible thrombus in MOLLY and cardioversion was aborted  * Patient was set up for outpatient KAYLYN/DCCV 2/2/2022 but concern for thrombus again noted  * Images reviewed with Dr Jael Morton who felt the presumed clot was artifact  * Patient underwent KAYLYN/CV 3/16/2022 with conversion to normal sinus rhythm  4/19/2022: Patient presents to the office today for follow up  He tells me that he has noted increased dyspnea on 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  No overt chest pain  Felt well after cardioversion but now feels just ok  ECG today shows atrial fibrillation with RVR at 106 bpm  Patient has been compliant with his medications             Patient Active Problem List   Diagnosis    Paroxysmal atrial fibrillation (HCC)    Elevated troponin    Chronic systolic congestive heart failure (HCC)    Cardiomyopathy (HCC)    Abnormal TSH    Stage 3b chronic kidney disease ([facility] [address] )    Benign essential HTN    Mixed hyperlipidemia    Alcoholic cardiomyopathy ([facility] [address] )     Past Medical History:   Diagnosis Date    Atrial fibrillation ([facility] [address] )     Hyperlipidemia     Hypertension     Overweight     Systolic heart failure (HCC)     Tachycardia induced cardiomyopathy ([facility] [address] )      Social History     Socioeconomic History    Marital status: Single     Spouse name: Not on file    Number of children: Not on file    Years of education: Not on file    Highest education level: Not on file   Occupational History    Not on file   Tobacco Use    Smoking status: Former Smoker     Types: Cigarettes    Smokeless tobacco: Never Used   Vaping Use    Vaping Use: Never used   Substance and Sexual Activity    Alcohol use: Not Currently     Comment: rare now, previously about 12 per week    Drug use: Never    Sexual activity: Yes     Comment: Defer   Other Topics Concern    Not on file   Social History Narrative    Not on file     Social Determinants of Health     Financial Resource Strain: Not on file   Food Insecurity: Not on file   Transportation Needs: Not on file   Physical Activity: Not on file   Stress: Not on file   Social Connections: Not on file   Intimate Partner Violence: Not on file   Housing Stability: Not on file      Family History   Problem Relation Age of Onset    No Known Problems Mother     Colon cancer Father      Past Surgical History:   Procedure Laterality Date    CARDIAC CATHETERIZATION  02/22/2018    CARDIOVERSION  03/2018    CARDIOVERSION  08/06/2020    INGUINAL HERNIA REPAIR      SINUS SURGERY      WISDOM TOOTH EXTRACTION         Current Outpatient Medications:     apixaban (ELIQUIS) 5 mg, Take 1 tablet (5 mg total) by mouth 2 (two) times a day, Disp: 60 tablet, Rfl: 11    atorvastatin (LIPITOR) 40 mg tablet, Take 1 tablet (40 mg total) by mouth daily with dinner, Disp: 90 tablet, Rfl: 3    digoxin (LANOXIN) 0 125 mg tablet, Take 1 tablet (125 mcg total) by mouth daily, Disp: 90 tablet, Rfl: 3    diltiazem (CARDIZEM CD) 240 mg 24 hr capsule, Take 1 capsule (240 mg total) by mouth daily, Disp: 90 capsule, Rfl: 3    metoprolol succinate (TOPROL-XL) 100 mg 24 hr tablet, Take 1 tablet (100 mg total) by mouth 2 (two) times a day, Disp: 180 tablet, Rfl: 3    rOPINIRole (REQUIP) 0 5 mg tablet, Take 1 tablet (0 5 mg total) by mouth daily at bedtime, Disp: 90 tablet, Rfl: 3    benzonatate (TESSALON PERLES) 100 mg capsule, Take 1 capsule (100 mg total) by mouth every 8 (eight) hours (Patient not taking: Reported on 4/19/2022 ), Disp: 21 capsule, Rfl: 0    magnesium oxide (MAG-OX) 400 mg, Take 1 tablet (400 mg total) by mouth 2 (two) times a day, Disp: 60 tablet, Rfl: 11    metoprolol succinate (TOPROL-XL) 25 mg 24 hr tablet, Take 1 tablet (25 mg total) by mouth 2 (two) times a day In addition to the 100 mg tablets for a total of 125 mg twice daily, Disp: 30 tablet, Rfl: 11  No Known Allergies      Cardiac Test Results:     KAYLYN 3/16/2022:   EF 40%  Moderate global hypokinesis  Mildly reduced RV function  Mildly dilated left and right atrium  No MOLLY thrombus  Successful cardioversion at 200J to normal sinus rhythm  Echocardiogram 8/5/2020:  Left ventricle is mildly dilated  EF 25-30%  Diffuse hypokinesis  Dilated right ventricle with reduced right ventricular systolic function  Moderately dilated left atrium  Moderately dilated right atrium  Moderate mitral regurgitation  Mild-to-moderate tricuspid regurgitation  Estimated PASP 35 mmHg  Review of Systems:    Review of Systems   Constitutional: Positive for fatigue  Negative for activity change and appetite change  HENT: Negative for congestion, hearing loss, tinnitus and trouble swallowing  Eyes: Negative for visual disturbance  Respiratory: Negative for cough, chest tightness, shortness of breath and wheezing  Dyspnea on exertion   Cardiovascular: Negative for chest pain, palpitations and leg swelling  Gastrointestinal: Negative for abdominal distention, abdominal pain, nausea and vomiting  Genitourinary: Negative for difficulty urinating  Musculoskeletal: Negative for arthralgias  Skin: Negative for rash  Neurological: Negative for dizziness, syncope and light-headedness  Hematological: Does not bruise/bleed easily  Psychiatric/Behavioral: Negative for confusion  The patient is not nervous/anxious  All other systems reviewed and are negative          Vitals:    04/19/22 1457   BP: 122/70   BP Location: Left arm   Patient Position: Sitting   Cuff Size: Standard   Pulse: (!) 106   Temp: (!) 95 °F (35 °C)   Weight: 98 7 kg (217 lb 9 6 oz)   Height: 6' 1" (1 854 m)     Vitals:    04/19/22 1457 Weight: 98 7 kg (217 lb 9 6 oz)     Height: 6' 1" (185 4 cm)     Physical Exam  Vitals reviewed  Constitutional:       Appearance: He is well-developed  HENT:      Head: Normocephalic and atraumatic  Eyes:      Conjunctiva/sclera: Conjunctivae normal       Pupils: Pupils are equal, round, and reactive to light  Neck:      Vascular: No JVD  Cardiovascular:      Rate and Rhythm: Tachycardia present  Rhythm irregular  Heart sounds: Normal heart sounds  No murmur heard  No friction rub  No gallop  Pulmonary:      Effort: Pulmonary effort is normal       Breath sounds: Normal breath sounds  Abdominal:      General: Bowel sounds are normal       Palpations: Abdomen is soft  Musculoskeletal:      Cervical back: Normal range of motion  Skin:     General: Skin is warm and dry  Neurological:      Mental Status: He is alert and oriented to person, place, and time     Psychiatric:         Behavior: Behavior normal

## 2022-04-19 NOTE — ASSESSMENT & PLAN NOTE
History of atrial fibrillation in 2018 for which he underwent cardioversion 03/2018  Tachycardia induced cardiomyopathy noted at that time with an EF of 20% which subsequently improved after cardioversion to 62%  Patient underwent repeat KAYLYN guided cardioversion 08/06/2020 with successful conversion to normal sinus rhythm  Terrie Hood He presented back to [address] [place] [address] on 9/24/2021 in A fib with RVR  KAYLYN 9/29/21 showed left atrial appendage thrombus and cardioversion was aborted  Estimated EF during KAYLYN at 25-30%  He remained on Eliquis 5 mg BID uninterrupted and KAYLYN was performed 11/10/2021 and 2/2/2022 with continued evidence of MOLLY thrombus  Cardioversion was aborted x 2  Reviewed last KAYLYN images with Dr Kamlesh Hill who felt artifact was present without thrombus  Successful cardioversion 3/16/2022  ECG today 4/19/2022 shows atrial fibrillation with RVR ( bpm)  Continue digoxin 0 125mg daily, diltiazem  mg daily, and metoprolol succinate but increase from 100 mg BID to 125 mg BID for rate control  Patient has been abstinent from alcohol intake and I applauded his efforts  Sleep study and nuclear stress test ordered but not yet done  I have recommend EP evaluation for ablation (or antiarrhythmics but favor ablation) given his recurrent atrial fibrillation and presumed tachycardia induced cardiomyopathy

## 2022-04-19 NOTE — ASSESSMENT & PLAN NOTE
Patient with prior history of tachycardia induced cardiomyopathy with prior EF of 20% that improved to 62% post cardioversion in 2018  EF 8/5/2020 25-30%  KAYLYN 9/29/2021 with EF 25-30%  KAYLYN 3/16/2022 EF 40%  There could also be some component of ETOH induced cardiomyopathy  Continue cardiomyopathy specific metoprolol succinate but increase 100mg BID to 125 mg BID  ACE-I/ARB held in hospital due to CASEY  Labs remain unchanged on recent blood work  Will add ACE-I/ARB if renal function improved  Updated blood work ordered  Can consider cardiac MRI if LVEF does not improve

## 2022-04-28 ENCOUNTER — HOSPITAL ENCOUNTER (OUTPATIENT)
Dept: SLEEP CENTER | Facility: HOSPITAL | Age: 57
Discharge: HOME | End: 2022-04-28
Payer: COMMERCIAL

## 2022-04-28 DIAGNOSIS — I48.0 PAROXYSMAL ATRIAL FIBRILLATION (HCC): ICD-10-CM

## 2022-04-28 PROCEDURE — G0399 HOME SLEEP TEST/TYPE 3 PORTA: HCPCS

## 2022-05-04 ENCOUNTER — TELEPHONE (OUTPATIENT)
Dept: CARDIOLOGY CLINIC | Facility: CLINIC | Age: 57
End: 2022-05-04

## 2022-05-04 NOTE — TELEPHONE ENCOUNTER
----- Message from Ebenezer Diamond 81 sent at 5/4/2022  1:29 PM EDT -----  Please let patient know that his sleep study shows moderate sleep apnea with oxygen levels dropping to 71 % during sleep  I would like to send him to a sleep medicine provider for further discussion of treatment options  If agreeable I will order referral   Thank you!

## 2022-05-06 ENCOUNTER — TELEPHONE (OUTPATIENT)
Dept: SLEEP CENTER | Facility: CLINIC | Age: 57
End: 2022-05-06

## 2022-05-06 NOTE — TELEPHONE ENCOUNTER
Left message for the patient to call back for sleep study results      Sleep study shows moderate ALVARO   CHANTAL Jacobson  Recommending consult with Dr Ortiz Leake   Patient needs to be scheduled

## 2022-05-20 ENCOUNTER — OFFICE VISIT (OUTPATIENT)
Dept: CARDIOLOGY CLINIC | Facility: CLINIC | Age: 57
End: 2022-05-20
Payer: COMMERCIAL

## 2022-05-20 VITALS
OXYGEN SATURATION: 98 % | BODY MASS INDEX: 29.03 KG/M2 | WEIGHT: 219 LBS | HEIGHT: 73 IN | RESPIRATION RATE: 12 BRPM | HEART RATE: 90 BPM | TEMPERATURE: 97 F | DIASTOLIC BLOOD PRESSURE: 78 MMHG | SYSTOLIC BLOOD PRESSURE: 120 MMHG

## 2022-05-20 DIAGNOSIS — R21 RASH: ICD-10-CM

## 2022-05-20 DIAGNOSIS — N18.32 STAGE 3B CHRONIC KIDNEY DISEASE (HCC): ICD-10-CM

## 2022-05-20 DIAGNOSIS — I50.22 CHRONIC SYSTOLIC CONGESTIVE HEART FAILURE (HCC): ICD-10-CM

## 2022-05-20 DIAGNOSIS — I10 BENIGN ESSENTIAL HTN: ICD-10-CM

## 2022-05-20 DIAGNOSIS — E78.2 MIXED HYPERLIPIDEMIA: ICD-10-CM

## 2022-05-20 DIAGNOSIS — G47.33 OSA (OBSTRUCTIVE SLEEP APNEA): ICD-10-CM

## 2022-05-20 DIAGNOSIS — I48.0 PAROXYSMAL ATRIAL FIBRILLATION (HCC): Primary | ICD-10-CM

## 2022-05-20 DIAGNOSIS — I42.9 CARDIOMYOPATHY, UNSPECIFIED TYPE (HCC): ICD-10-CM

## 2022-05-20 PROCEDURE — 99214 OFFICE O/P EST MOD 30 MIN: CPT | Performed by: NURSE PRACTITIONER

## 2022-05-20 NOTE — LETTER
May 20, 2022     Patient: French Adwoa  YOB: 1965  Date of Visit: 5/20/2022      To Whom it May Concern:    Sharri Rachel is under my professional care  Santosh Figueroa was seen in my office on 5/20/2022  Please excuse from work on this day  If you have any questions or concerns, please don't hesitate to call  Sincerely,          CHANTAL Sandoval        CC: Arturo Waller

## 2022-05-20 NOTE — PROGRESS NOTES
Cardiology Follow Up    Avril Sargent  1965  [id number]  [facility]  [address]  [facility] [address]  [phone]  [phone]    Eliazar Diaz presents for follow up of atrial fibrillation, cardiomyopathy, chronic systolic congestive heart failure, hypertension and hyperlipidemia  1  Paroxysmal atrial fibrillation [facility])  Assessment & Plan:  History of atrial fibrillation in 2018 for which he underwent cardioversion 03/2018  Tachycardia induced cardiomyopathy noted at that time with an EF of 20% which subsequently improved after cardioversion to 62%  Patient underwent repeat KAYLYN guided cardioversion 08/06/2020 with successful conversion to normal sinus rhythm  He presented back to [address] [place] [address] on 9/24/2021 in A fib with RVR  KAYLYN 9/29/21 showed left atrial appendage thrombus and cardioversion was aborted  Estimated EF during KAYLYN at 25-30%  He remained on Eliquis 5 mg BID uninterrupted and KAYLYN was performed 11/10/2021 and 2/2/2022 with continued evidence of MOLLY thrombus  Cardioversion was aborted x 2  Reviewed last KAYLYN images with Dr Jael Morton who felt artifact was present without thrombus  Successful cardioversion 3/16/2022  ECG 4/19/2022 shows atrial fibrillation with RVR ( bpm)  Continue digoxin 0 125mg daily, diltiazem  mg daily, and metoprolol succinate 125 mg BID  HR's somewhat improved today  Awaiting appointment with EP, Dr Shira Chi, scheduled for next week  Patient has been abstinent from alcohol intake and I applauded his efforts  Sleep study reveals moderate sleep apnea and referral to sleep medicine created  Stress test scheduled for next week  2  Cardiomyopathy, unspecified type [facility])  Assessment & Plan:  Patient with prior history of tachycardia induced cardiomyopathy with prior EF of 20% that improved to 62% post cardioversion in 2018  EF 8/5/2020 25-30%  KAYLYN 9/29/2021 with EF 25-30%  KAYLYN 3/16/2022 EF 40%  There could also be some component of ETOH induced cardiomyopathy  Continue cardiomyopathy specific metoprolol succinate 125 mg BID  ACE-I/ARB held in hospital due to CASEY  Labs remain unchanged on recent blood work  Will add ACE-I/ARB if renal function improved  Updated blood work ordered  Can consider cardiac MRI if LVEF does not improve  3  Chronic systolic congestive heart failure (HCC)  Assessment & Plan:    Wt Readings from Last 3 Encounters:   05/20/22 99 3 kg (219 lb)   04/19/22 98 7 kg (217 lb 9 6 oz)   03/16/22 98 kg (216 lb)     Patient with prior history of tachycardia induced cardiomyopathy with prior EF of 20% that improved to 62% post cardioversion in 2018  EF 8/5/2020 25-30%  EF 40% on KAYLYN 3/16/2022  No evidence of volume overload during recent hospitalization or at follow up today  Not on diuretic therapy at present  Reviewed 2g sodium diet, 2L fluid restriction, daily weights  He will report 3 pound weight gain in 1 day/5 pound weight gain in 1 week, shortness of breath, leg swelling, or any other concerns  Will continue to monitor  4  Benign essential HTN  Assessment & Plan:  Blood pressure remains acceptable  Continue diltiazem  mg daily, metoprolol succinate 125 mg BID  Not on ACE/ARB secondary to renal function, will initiate as able  5  Mixed hyperlipidemia  Assessment & Plan:  Lipid panel 8/4/2020:     HDL 40  LDL 80  Currently on atorvastatin 40mg daily  Plan for updated lipid panel at follow up  6  ALVARO (obstructive sleep apnea)  Assessment & Plan:  New diagnosis of moderate sleep apnea by home sleep study 5/4/2022  We reviewed importance of sleep apnea management in the setting of atrial fibrillation  Referral placed to [address] [place] [address] sleep medicine  Orders:  -     Ambulatory Referral to Sleep Medicine; Future    7   Stage 3b chronic kidney disease [facility])  Assessment & Plan:  Lab Results   Component Value Date    EGFR 67 02/23/2022 EGFR 62 01/31/2022    EGFR 61 11/09/2021    CREATININE 1 19 02/23/2022    CREATININE 1 27 01/31/2022    CREATININE 1 30 17/86/3558     Case complicated by CASEY during hospital admission  Avoiding nephrotoxic agents  8  Rash  Comments:  consistent with contact dermatitis  Recommend trial of OTC cortisone and follow up with PCP  HPI  Jada Velásquez has a past medical history of paroxysmal atrial fibrillation, cardiomyopathy (possibly tachycardia induced), CHF, HTN, HLD      A fib history dates back to 2018 at which time his EF was noted to be 20%  He was referred to [facility] for cardiac catheterization on 2/22/2018 which showed normal coronary arteries  He underwent cardioversion on 3/2018 and was started on amiodarone  Repeat echo 8/2018 showed improved EF to 62%  He lost his insurance and was lost to follow up      He presented to his PCP office in 2020 for evaluation after a fall and was found to be in A fib with RVR and directed to the [address] [place] [address] ER where he was admitted  Echo during that admission revealed EF 25-30%  He underwent successful KAYLYN guided cardioversion on 8/6/2020 and was started on amiodarone loading, Eliquis, and metoprolol succinate  He was then lost to follow up again  Admitted to [address] [place] [address] 9/24-9/30/2021 for a fib with RVR when he presented with fatigue and shortness of breath x 2 weeks that felt similar to prior a fib episodes  He was without medical insurance and had run out of his medications  EF of 25-30% by KAYLYN with possible MOLLY thrombus  No cardioversion  Diltiazem was used for rate control following discussion with EP as suspected tachy-mediated cardiomyopathy  Metoprolol succinate resumed and titrated  Digoxin added  Eliquis samples provided      He underwent repeat KAYLYN on 11/10/2021, however MOLLY thrombus was present and cardioversion was aborted  He had been faithful with Eliquis dosing      KAYLYN/DCCV 2/2/2022 again showed concern for thrombus, but images were reviewed with Dr Ward Hercules who felt the presumed clot was artifact  Ana López underwent KAYLYN/CV 3/16/2022 with conversion to normal sinus rhythm  He followed up most recently with Dr Betsy Thomas on 4/19/2022  ECG revealed a fib with RVR, rate 106 bpm  He had been compliant with his medications  He noted increased dyspnea on exertion 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  He denied chest pain  He reported that he felt well after the cardioversion, but at the time of his visit was feeling "ok"  Metoprolol succinate was increased to 125 mg BID  He was referred to EP, Dr Fanny Guzman, for further a fib management      5/20/2022: Ana López presents today for close follow up  He continues to be in atrial fibrillation with rates in the 90's today  He is scheduled to see EP next week  He completed his home sleep study which showed moderate sleep apnea  He is agreeable for sleep medicine referral, which I will place today  He is scheduled for his stress test next week as well  He continues with dyspnea on exertion and fatigue  No chest pain, lightheadedness, palpitations, or leg swelling  He is taking his medications faithfully  He denies issues with the higher dose of metoprolol  No unusual bleeding/bruising      Medical Problems     Problem List     Elevated troponin    Abnormal TSH    Stage 3b chronic kidney disease (HCC)    Paroxysmal atrial fibrillation (HCC)    Cardiomyopathy (HCC)    Chronic systolic congestive heart failure (HCC)    Benign essential HTN    Mixed hyperlipidemia    Alcoholic cardiomyopathy (HCC)    ALVARO (obstructive sleep apnea)           Past Medical History:   Diagnosis Date    Atrial fibrillation ([facility] [address] )     Hyperlipidemia     Hypertension     Overweight     Systolic heart failure (HCC)     Tachycardia induced cardiomyopathy ([facility] [address] )      Social History     Socioeconomic History    Marital status: Single     Spouse name: Not on file    Number of children: Not on file    Years of education: Not on file    Highest education level: Not on file   Occupational History    Not on file   Tobacco Use    Smoking status: Former Smoker     Types: Cigarettes    Smokeless tobacco: Never Used   Vaping Use    Vaping Use: Never used   Substance and Sexual Activity    Alcohol use: Not Currently     Comment: rare now, previously about 12 per week    Drug use: Never    Sexual activity: Yes     Comment: Defer   Other Topics Concern    Not on file   Social History Narrative    Not on file     Social Determinants of Health     Financial Resource Strain: Not on file   Food Insecurity: Not on file   Transportation Needs: Not on file   Physical Activity: Not on file   Stress: Not on file   Social Connections: Not on file   Intimate Partner Violence: Not on file   Housing Stability: Not on file      Family History   Problem Relation Age of Onset    No Known Problems Mother     Colon cancer Father      Past Surgical History:   Procedure Laterality Date    CARDIAC CATHETERIZATION  02/22/2018    CARDIOVERSION  03/2018    CARDIOVERSION  08/06/2020    INGUINAL HERNIA REPAIR      SINUS SURGERY      WISDOM TOOTH EXTRACTION         Current Outpatient Medications:     apixaban (ELIQUIS) 5 mg, Take 1 tablet (5 mg total) by mouth 2 (two) times a day, Disp: 60 tablet, Rfl: 11    atorvastatin (LIPITOR) 40 mg tablet, Take 1 tablet (40 mg total) by mouth daily with dinner, Disp: 90 tablet, Rfl: 3    digoxin (LANOXIN) 0 125 mg tablet, Take 1 tablet (125 mcg total) by mouth daily, Disp: 90 tablet, Rfl: 3    diltiazem (CARDIZEM CD) 240 mg 24 hr capsule, Take 1 capsule (240 mg total) by mouth daily, Disp: 90 capsule, Rfl: 3    metoprolol succinate (TOPROL-XL) 100 mg 24 hr tablet, Take 1 tablet (100 mg total) by mouth 2 (two) times a day, Disp: 180 tablet, Rfl: 3    metoprolol succinate (TOPROL-XL) 25 mg 24 hr tablet, Take 1 tablet (25 mg total) by mouth 2 (two) times a day In addition to the 100 mg tablets for a total of 125 mg twice daily, Disp: 30 tablet, Rfl: 11    rOPINIRole (REQUIP) 0 5 mg tablet, Take 1 tablet (0 5 mg total) by mouth daily at bedtime, Disp: 90 tablet, Rfl: 3    magnesium oxide (MAG-OX) 400 mg, Take 1 tablet (400 mg total) by mouth 2 (two) times a day, Disp: 60 tablet, Rfl: 11  No Known Allergies    Labs:     Chemistry        Component Value Date/Time    K 3 5 02/23/2022 1238     02/23/2022 1238    CO2 25 02/23/2022 1238    BUN 13 02/23/2022 1238    CREATININE 1 19 02/23/2022 1238        Component Value Date/Time    CALCIUM 8 3 02/23/2022 1238    ALKPHOS 94 02/23/2022 1238    AST 18 02/23/2022 1238    ALT 28 02/23/2022 1238        Cardiac Test Results:      KAYLYN 3/16/2022:   EF 40%  Moderate global hypokinesis  Mildly reduced RV function  Mildly dilated left and right atrium  No MOLLY thrombus  Successful cardioversion at 200J to normal sinus rhythm       Echocardiogram 8/5/2020:  Left ventricle is mildly dilated  EF 25-30%  Diffuse hypokinesis  Dilated right ventricle with reduced right ventricular systolic function  Moderately dilated left atrium  Moderately dilated right atrium  Moderate mitral regurgitation  Mild-to-moderate tricuspid regurgitation  Estimated PASP 35 mmHg  Review of Systems   Constitutional: Positive for malaise/fatigue  HENT: Negative  Cardiovascular: Positive for dyspnea on exertion  Negative for chest pain, irregular heartbeat, leg swelling, near-syncope, orthopnea and palpitations  Respiratory: Negative for cough, shortness of breath and snoring  Endocrine: Negative  Skin: Positive for rash (sternum, red, not itching)  Musculoskeletal: Negative  Gastrointestinal: Negative  Genitourinary: Negative  Neurological: Negative  Psychiatric/Behavioral: Negative          Vitals:    05/20/22 1400   BP: 120/78   Pulse: 90   Resp: 12   Temp: (!) 97 °F (36 1 °C)   SpO2: 98%     Vitals:    05/20/22 1400   Weight: 99 3 kg (219 lb)     Height: 6' 1" (185 4 cm)   Body mass index is 28 89 kg/m²  Physical Exam  Vitals and nursing note reviewed  Constitutional:       General: He is not in acute distress  Appearance: He is well-developed  He is not diaphoretic  HENT:      Head: Normocephalic and atraumatic  Neck:      Vascular: No carotid bruit or JVD  Cardiovascular:      Rate and Rhythm: Normal rate  Rhythm irregular  Pulses: Intact distal pulses  Heart sounds: Normal heart sounds, S1 normal and S2 normal  No murmur heard  No friction rub  No gallop  Comments: No lower extremity edema  Pulmonary:      Effort: Pulmonary effort is normal  No respiratory distress  Breath sounds: Normal breath sounds  Abdominal:      General: There is no distension  Palpations: Abdomen is soft  Tenderness: There is no abdominal tenderness  Skin:     General: Skin is warm and dry  Findings: Rash present  Neurological:      Mental Status: He is alert and oriented to person, place, and time  Psychiatric:         Mood and Affect: Mood and affect normal          Speech: Speech normal          Behavior: Behavior normal  Behavior is cooperative           Cognition and Memory: Cognition and memory normal

## 2022-05-20 NOTE — PATIENT INSTRUCTIONS
Your blood pressure is in good control  Heart rate is better today  Your sleep study showed moderate sleep apnea  I placed a referral to sleep medicine  Watch for their phone call  We will be in touch with your stress test results  Keep appointment with Dr Amanda Sifuentes next week for further treatment  Contact us with any concerns

## 2022-05-22 PROBLEM — I42.6 ALCOHOLIC CARDIOMYOPATHY (HCC): Status: RESOLVED | Noted: 2021-11-10 | Resolved: 2022-05-22

## 2022-05-22 NOTE — ASSESSMENT & PLAN NOTE
Blood pressure remains acceptable  Continue diltiazem  mg daily, metoprolol succinate 125 mg BID  Not on ACE/ARB secondary to renal function, will initiate as able

## 2022-05-22 NOTE — ASSESSMENT & PLAN NOTE
New diagnosis of moderate sleep apnea by home sleep study 5/4/2022  We reviewed importance of sleep apnea management in the setting of atrial fibrillation  Referral placed to [address] [place] [address] sleep medicine

## 2022-05-22 NOTE — ASSESSMENT & PLAN NOTE
Patient with prior history of tachycardia induced cardiomyopathy with prior EF of 20% that improved to 62% post cardioversion in 2018  EF 8/5/2020 25-30%  KAYLYN 9/29/2021 with EF 25-30%  KAYLYN 3/16/2022 EF 40%  There could also be some component of ETOH induced cardiomyopathy  Continue cardiomyopathy specific metoprolol succinate 125 mg BID  ACE-I/ARB held in hospital due to CASEY  Labs remain unchanged on recent blood work  Will add ACE-I/ARB if renal function improved  Updated blood work ordered  Can consider cardiac MRI if LVEF does not improve

## 2022-05-22 NOTE — ASSESSMENT & PLAN NOTE
Wt Readings from Last 3 Encounters:   05/20/22 99 3 kg (219 lb)   04/19/22 98 7 kg (217 lb 9 6 oz)   03/16/22 98 kg (216 lb)     Patient with prior history of tachycardia induced cardiomyopathy with prior EF of 20% that improved to 62% post cardioversion in 2018  EF 8/5/2020 25-30%  EF 40% on KAYLYN 3/16/2022  No evidence of volume overload during recent hospitalization or at follow up today  Not on diuretic therapy at present  Reviewed 2g sodium diet, 2L fluid restriction, daily weights  He will report 3 pound weight gain in 1 day/5 pound weight gain in 1 week, shortness of breath, leg swelling, or any other concerns  Will continue to monitor

## 2022-05-22 NOTE — ASSESSMENT & PLAN NOTE
Lab Results   Component Value Date    EGFR 67 02/23/2022    EGFR 62 01/31/2022    EGFR 61 11/09/2021    CREATININE 1 19 02/23/2022    CREATININE 1 27 01/31/2022    CREATININE 1 30 05/38/9923     Case complicated by CASEY during hospital admission  Avoiding nephrotoxic agents

## 2022-05-22 NOTE — ASSESSMENT & PLAN NOTE
Lipid panel 8/4/2020:     HDL 40  LDL 80  Currently on atorvastatin 40mg daily  Plan for updated lipid panel at follow up

## 2022-05-24 ENCOUNTER — HOSPITAL ENCOUNTER (OUTPATIENT)
Dept: NUCLEAR MEDICINE | Facility: HOSPITAL | Age: 57
Discharge: HOME | End: 2022-05-24
Payer: COMMERCIAL

## 2022-05-24 ENCOUNTER — HOSPITAL ENCOUNTER (OUTPATIENT)
Dept: NON INVASIVE DIAGNOSTICS | Facility: HOSPITAL | Age: 57
Discharge: HOME | End: 2022-05-24
Payer: COMMERCIAL

## 2022-05-24 DIAGNOSIS — I48.0 PAROXYSMAL ATRIAL FIBRILLATION (HCC): ICD-10-CM

## 2022-05-24 DIAGNOSIS — I42.9 CARDIOMYOPATHY, UNSPECIFIED TYPE (HCC): ICD-10-CM

## 2022-05-24 LAB
MAX HR PERCENT: 99 %
MAX HR: 163 BPM
RATE PRESSURE PRODUCT: NORMAL
SL CV REST NUCLEAR ISOTOPE DOSE: 10.9 MCI
SL CV STRESS NUCLEAR ISOTOPE DOSE: 32.4 MCI
SL CV STRESS RECOVERY BP: NORMAL MMHG
SL CV STRESS RECOVERY HR: 96 BPM
SL CV STRESS RECOVERY O2 SAT: 100 %
SL CV STRESS STAGE REACHED: 1
STRESS ANGINA INDEX: 0
STRESS BASELINE BP: NORMAL MMHG
STRESS BASELINE HR: 87 BPM
STRESS O2 SAT REST: 98 %
STRESS PEAK HR: 162 BPM
STRESS PERCENT HR: 99 %
STRESS POST ESTIMATED WORKLOAD: 4.6 METS
STRESS POST EXERCISE DUR MIN: 2 MIN
STRESS POST EXERCISE DUR SEC: 16 SEC
STRESS POST O2 SAT PEAK: 94 %
STRESS POST PEAK BP: 162 MMHG
STRESS TARGET HR: 162 BPM

## 2022-05-24 PROCEDURE — 78452 HT MUSCLE IMAGE SPECT MULT: CPT

## 2022-05-24 PROCEDURE — 93017 CV STRESS TEST TRACING ONLY: CPT

## 2022-05-24 PROCEDURE — A9502 TC99M TETROFOSMIN: HCPCS

## 2022-05-24 PROCEDURE — G1004 CDSM NDSC: HCPCS

## 2022-05-25 ENCOUNTER — TELEPHONE (OUTPATIENT)
Dept: CARDIOLOGY CLINIC | Facility: CLINIC | Age: 57
End: 2022-05-25

## 2022-05-25 ENCOUNTER — PREP FOR PROCEDURE (OUTPATIENT)
Dept: CARDIOLOGY CLINIC | Facility: CLINIC | Age: 57
End: 2022-05-25

## 2022-05-25 ENCOUNTER — CONSULT (OUTPATIENT)
Dept: CARDIOLOGY CLINIC | Facility: CLINIC | Age: 57
End: 2022-05-25
Payer: COMMERCIAL

## 2022-05-25 VITALS
WEIGHT: 219 LBS | HEART RATE: 103 BPM | DIASTOLIC BLOOD PRESSURE: 70 MMHG | BODY MASS INDEX: 29.03 KG/M2 | SYSTOLIC BLOOD PRESSURE: 100 MMHG | HEIGHT: 73 IN

## 2022-05-25 DIAGNOSIS — I48.19 PERSISTENT ATRIAL FIBRILLATION (HCC): Primary | ICD-10-CM

## 2022-05-25 DIAGNOSIS — I48.0 PAROXYSMAL ATRIAL FIBRILLATION (HCC): Primary | ICD-10-CM

## 2022-05-25 DIAGNOSIS — I42.9 CARDIOMYOPATHY, UNSPECIFIED TYPE (HCC): ICD-10-CM

## 2022-05-25 DIAGNOSIS — I48.91 ATRIAL FIBRILLATION, UNSPECIFIED TYPE (HCC): Primary | ICD-10-CM

## 2022-05-25 DIAGNOSIS — I48.0 PAROXYSMAL ATRIAL FIBRILLATION (HCC): ICD-10-CM

## 2022-05-25 PROCEDURE — 99205 OFFICE O/P NEW HI 60 MIN: CPT | Performed by: INTERNAL MEDICINE

## 2022-05-25 RX ORDER — AMIODARONE HYDROCHLORIDE 200 MG/1
200 TABLET ORAL DAILY
Qty: 90 TABLET | Refills: 0 | Status: SHIPPED | OUTPATIENT
Start: 2022-05-25 | End: 2022-06-14

## 2022-05-25 NOTE — TELEPHONE ENCOUNTER
----- Message from Ebenezer Wren sent at 5/25/2022  6:20 AM EDT -----  Please let Latonya Mckoy know that his stress test showed poor exercise tolerance with no evidence of ischemia (lack of blood flow to the heart)  Thank you!

## 2022-05-25 NOTE — LETTER
May 25, 2022     Patient: Arta Carrel  YOB: 1965  Date of Visit: 5/25/2022      To Whom it May Concern:    Carlos Mcclure is under my professional care  Candy Diana was seen in my office on 5/25/2022  If you have any questions or concerns, please don't hesitate to call  Sincerely,          Sb Bridges MD        CC: [facility]

## 2022-05-25 NOTE — PROGRESS NOTES
HEART AND VASCULAR  CARDIAC [address]    Outpatient New Consult  Today's Date: 05/25/22        Patient name: Eli Marroquin  YOB: 1965  Sex: male         Chief Complaint: Persistent afib      ASSESSMENT:  Problem List Items Addressed This Visit        Cardiovascular and Mediastinum    Paroxysmal atrial fibrillation ([facility] [address] )    Cardiomyopathy ([facility] [address] )        60 yo male  1) Persistent afib- h/o tachycardia related myopathy  Post DCCv March 2022 back in afib April 19 2022  LA Diameter 4 7cm  He had KAYLYN's Nov 2021 and Feb 2022 w LA thrombus  RF: used to drink but quit alcohol  Moderate ALVARO not on CPAP    2) Tachycardia related myopathy EF 40% most recently was up to 60 % after DCCV Aug 2020 (at that time EF 25%)  On dilt and metoprolol              PLAN:  1  Recommend afib ablation cryo, w post wall isolation  INFORMED CONSENT: Risks, benefits, and alternatives to atrial fibrillation ablation with possibly a combination of cryoballoon and radiofrequency ablation, and associated procedures such as transesophageal echocardiogram and atrial flutter ablation discussed  Alternative treatment with medical management has been tried and/or discussed  Usual pre and post operative expectations were discussed  Estimated complication rate is <6%  Atrial fibrillation ablation was explained to be a treatment that reduces burden of atrial fibrillation but is not a cure and medications and repeat ablations are often required  Although most patients derive improvement in symptoms and burden in atrial fibrillation, there are some patients that have not improved following this procedure  Blood thinners will not be discontinued immediately after ablation and may be required long term regardless of result     The patient understood risks include but not limited to death, esophageal injury, phrenic nerve injury (diaphragm), stroke, bleeding and vascular complication, bleeding around the heart and open heart surgery  2 Resume amidoarone, plan to stop 3 months post ablation          No orders of the defined types were placed in this encounter  There are no discontinued medications    HPI/Subjective:     60 yo male h/o persistent afib, EF down as low as 20% 2018, normal cath, EF improed nto normal, then had recurrent afib and DCCV again , started on amiodarone Aug 2020, EF was again 25-30 and again came up  Then back in afib again planned KAYLYN Nov, but possible LA thrombus, put on Eliquis, had another KAYLYN Feb 2022 still thrombus, so finally March 2022 KAYLYN-done and reviewed w Dr Luis Enrique Pimentel and no thrmobus so had DCCV but unfortunately afib recurred one month later, EF now about 40%  Rates in afib 90s  He has quit ETOH , awaiting Sleep clinic appt for CPAP  He does not feel palpitations but has decreased exercise tolerance in afib  Please note HPI is listed by problem with with update following it, it is copied again in the assessment above and reflects medical decision making as well  Complete 12 point ROS reviewed and otherwise non pertinent or negative except as per HPI pertinent positives in Cardiovascular and Respiratory emphasized  Please see paper chart for outpatient clinic patients where the patient completed the 12 point ROS survey  Past Medical History:   Diagnosis Date    Atrial fibrillation ([address] )     Hyperlipidemia     Hypertension     Overweight     Systolic heart failure (HCC)     Tachycardia induced cardiomyopathy (HCC)        No Known Allergies  I reviewed the Home Medication list and Allergies in the chart     Scheduled Meds:  Current Outpatient Medications   Medication Sig Dispense Refill    apixaban (ELIQUIS) 5 mg Take 1 tablet (5 mg total) by mouth 2 (two) times a day 60 tablet 11    atorvastatin (LIPITOR) 40 mg tablet Take 1 tablet (40 mg total) by mouth daily with dinner 90 tablet 3    digoxin (LANOXIN) 0 125 mg tablet Take 1 tablet (125 mcg total) by mouth daily 90 tablet 3    diltiazem (CARDIZEM CD) 240 mg 24 hr capsule Take 1 capsule (240 mg total) by mouth daily 90 capsule 3    magnesium oxide (MAG-OX) 400 mg Take 1 tablet (400 mg total) by mouth 2 (two) times a day 60 tablet 11    metoprolol succinate (TOPROL-XL) 100 mg 24 hr tablet Take 1 tablet (100 mg total) by mouth 2 (two) times a day 180 tablet 3    metoprolol succinate (TOPROL-XL) 25 mg 24 hr tablet Take 1 tablet (25 mg total) by mouth 2 (two) times a day In addition to the 100 mg tablets for a total of 125 mg twice daily 30 tablet 11    rOPINIRole (REQUIP) 0 5 mg tablet Take 1 tablet (0 5 mg total) by mouth daily at bedtime 90 tablet 3     No current facility-administered medications for this visit       PRN Meds:         Family History   Problem Relation Age of Onset    No Known Problems Mother     Colon cancer Father        Social History     Socioeconomic History    Marital status: Single     Spouse name: Not on file    Number of children: Not on file    Years of education: Not on file    Highest education level: Not on file   Occupational History    Not on file   Tobacco Use    Smoking status: Former Smoker     Types: Cigarettes    Smokeless tobacco: Never Used   Vaping Use    Vaping Use: Never used   Substance and Sexual Activity    Alcohol use: Not Currently     Comment: rare now, previously about 12 per week    Drug use: Never    Sexual activity: Yes     Comment: Defer   Other Topics Concern    Not on file   Social History Narrative    Not on file     Social Determinants of Health     Financial Resource Strain: Not on file   Food Insecurity: Not on file   Transportation Needs: Not on file   Physical Activity: Not on file   Stress: Not on file   Social Connections: Not on file   Intimate Partner Violence: Not on file   Housing Stability: Not on file         OBJECTIVE:    There were no vitals taken for this visit  There were no vitals filed for this visit  GEN: No acute distress, Alert and oriented, well appearing  HEENT:External ears normal, oral pharynx clear, mucous membranes moist  EYES: Pupils equal, sclera anicteric, midline, normal conjuctiva  NECK: No JVD, supple, no obvious masses or thryomegaly or goiter  CARDIOVASCULAR:ireg irreg, No murmur, rub, gallops S1,S2  LUNGS: Clear To auscultation bilaterally, normal effort, no rales, rhonchi, crackles   ABDOMEN:  nondistended,  without obvious organomegaly or ascites  EXTREMITIES/VASCULAR:  No edema  warm an well perfused  PSYCH: Normal Affect,  linear speech pattern without evidence of psychosis  NEURO: Grossly intact, moving all extremiteis equal, face symmetric, alert and responsive, no obvious focal defecits   GAIT:  Ambulates normally without difficulty  HEME: No bleeding, bruising, petechia, purpura     SKIN: No significant rashes on visibile skin, warm, no diaphoresis or pallor  Lab Results:       LABS:      Chemistry        Component Value Date/Time    K 3 5 02/23/2022 1238     02/23/2022 1238    CO2 25 02/23/2022 1238    BUN 13 02/23/2022 1238    CREATININE 1 19 02/23/2022 1238        Component Value Date/Time    CALCIUM 8 3 02/23/2022 1238    ALKPHOS 94 02/23/2022 1238    AST 18 02/23/2022 1238    ALT 28 02/23/2022 1238            No results found for: CHOL  Lab Results   Component Value Date    HDL 40 08/04/2020     Lab Results   Component Value Date    LDLCALC 89 08/04/2020     Lab Results   Component Value Date    TRIG 111 08/04/2020     No results found for: CHOLHDL    IMAGING: NM myocardial perfusion spect (stress and/or rest)    Result Date: 5/24/2022  Narrative: Marely Dang  Perfusion: Normal post stress perfusion  No definitive signs of ischemia on nuclear images    Stress Function: Calculated LVEF 46%   Pt with AFIB during study with numerous beats elimated from image aquisition  Given irregular cardiac intervals with AFIB, suggest ECHO for better LVEF evaluation    Deconditioned HR responce to exercise  Poor exercise tolerence  Home Study    Result Date: 5/4/2022  Narrative: Home Sleep Testing Report Patient Name: Julienne NOE O B : 1965 Patient Number: [id number] Dates of Home Study: 4/28/2022 Referring Physician: Ebenezer Wren Interpreting Physician: Marciano Gross MD STUDY FORMAT: The patient was admitted to the sleep laboratory at the [place] [address]  and oriented to the home sleep study testing procedure and equipment  The home sleep testing study was performed using the [employer] Nox-T3 Recorder which is a Type III monitoring system  A return demonstration by the patient helped to assure correct usage  The following parameters were monitored: p-flow via nasal cannula, body position, oximetry, pulse rate and respiratory effort via thoracic and abdominal RIP belts  The sleep study was scored following the rules established by the American Academy of Sleep Medicine (AASM)  Hypopneas are defined as a ?30% drop in flow for ? 10 seconds that are associated with ?4% desaturations  SLOANE is the Respiratory Event Index (number of respiratory events per hour) and is a surrogate for the apnea/hypopnea index (AHI)  PATIENT HISTORY: Home sleep testing was undertaken to evaluate this patient with suggestive symptoms and /or risk factors for sleep  disordered breathing  TESTING RESULTS: The test results are from Night 1  The total time in bed (analysis time) was 479 minutes  The patient had a total of 168 respiratory events made up of 117 obstructive apneas, 1 central apneas, 0 mixed apneas and 50 hypopneas resulting in a respiratory event index (SLOANE) of 23 4  The lowest SpO2 recorded is 71% and 4 5% of the study was spent with saturations below 90%  The snore index was 2 9%     Impression: 1   Moderate obstructive sleep apnea 2  Significant hypoxia Limitations of home sleep testing compromises accuracy  Based on the results of this study, a follow-up study to titrate positive airway pressure is recommended  Clinical correlation is advised  Board Certified Sleep Physician       Cardiac testing:   Results for orders placed during the hospital encounter of 20    Echo complete with contrast if indicated    Narrative  [address]  [address]    Transthoracic Echocardiogram  2D, M-mode, Doppler, and Color Doppler    Study date:  05-Aug-2020    Patient: Lucie Ceballos  MR number: [id number]  Account number: [de-identified]  : 1965  Age: 63 years  Gender: Male  Status: Inpatient  Location: [facility]  Height: 72 in  Weight: 200 lb  BP: 137/ 97 mmHg    Indications: Atrial Fibrillation    Diagnoses: I48 0 - Atrial fibrillation    Sonographer:  MELONY Benitez  Primary Physician:  Luigi Lovell DO  Referring Physician:  Dawit Rboerts MD  Group:  [facility]  Interpreting Physician:  Rogers Armstrong DO    SUMMARY    LEFT VENTRICLE:  The ventricle was mildly dilated  Systolic function was moderately reduced  Ejection fraction was estimated in the range of 25 % to 30 %  There was diffuse hypokinesis  Wall thickness was at the upper limits of normal   Left ventricular diastolic function not assessed due to atrial fibrillation  RIGHT VENTRICLE:  The ventricle was dilated  Systolic function was reduced  TAPSE 1 3  LEFT ATRIUM:  The atrium was moderately dilated  RIGHT ATRIUM:  The atrium was moderately dilated  MITRAL VALVE:  There was moderate regurgitation  The regurgitant jet was centrally directed  TRICUSPID VALVE:  There was mild to moderate regurgitation  Estimated peak PA pressure was 35 mmHg  Assuming a right atrial pressure of 15 mmHg      IVC, HEPATIC VEINS:  The inferior vena cava was dilated  Respirophasic changes in dimension were absent  COMPARISONS:  Compared to limited echocardiogram 8/22/2018, the EF is signicantly lower  Previous EF 62 5%  HISTORY: PRIOR HISTORY: AFIB, CHF, CMP, Elevated troponin, H/O MOLLY thrombus    PROCEDURE: The study was performed in the [address] [place] [address] Echo Lab  This was a routine study  The transthoracic approach was used  The study included complete 2D imaging, M-mode, complete spectral Doppler, and color Doppler  The heart  rate was 89 bpm, at the start of the study  Images were obtained from the parasternal, apical, subcostal, and suprasternal notch acoustic windows  Image quality was good  LEFT VENTRICLE: The ventricle was mildly dilated  Systolic function was moderately reduced  Ejection fraction was estimated in the range of 25 % to 30 %  There was diffuse hypokinesis  Wall thickness was at the upper limits of normal  No  evidence of apical thrombus  DOPPLER: Left ventricular diastolic function not assessed due to atrial fibrillation  RIGHT VENTRICLE: The ventricle was dilated  Systolic function was reduced  TAPSE 1 3  LEFT ATRIUM: The atrium was moderately dilated  RIGHT ATRIUM: The atrium was moderately dilated  MITRAL VALVE: The annulus was dilated  There was minimal thickening  There was normal leaflet separation  DOPPLER: The transmitral velocity was within the normal range  There was no evidence for stenosis  There was moderate regurgitation  The regurgitant jet was centrally directed  AORTIC VALVE: The valve was trileaflet  Leaflets exhibited normal thickness and normal cuspal separation  DOPPLER: Transaortic velocity was within the normal range  There was no evidence for stenosis  There was no significant  regurgitation  TRICUSPID VALVE: The valve structure was normal  There was normal leaflet separation  DOPPLER: The transtricuspid velocity was within the normal range  There was no evidence for stenosis   There was mild to moderate regurgitation  Estimated  peak PA pressure was 35 mmHg  Assuming a right atrial pressure of 15 mmHg  PULMONIC VALVE: Leaflets exhibited normal thickness, no calcification, and normal cuspal separation  DOPPLER: The transpulmonic velocity was within the normal range  There was no significant regurgitation  PERICARDIUM: There was no pericardial effusion  The pericardium was normal in appearance  AORTA: The root exhibited normal size  SYSTEMIC VEINS: IVC: The inferior vena cava was dilated  Respirophasic changes in dimension were absent  SYSTEM MEASUREMENT TABLES    2D  %FS: 11 48 %  AV Diam: 3 25 cm  EDV(Teich): 134 19 ml  EF(Teich): 24 69 %  ESV(Teich): 101 06 ml  IVSd: 1 09 cm  LA Diam: 4 74 cm  LAAs A4C: 26 97 cm2  LAESV A-L A4C: 84 97 ml  LAESV MOD A4C: 80 02 ml  LALs A4C: 7 26 cm  LVEDV MOD A4C: 121 94 ml  LVEF MOD A4C: 20 57 %  LVESV MOD A4C: 96 85 ml  LVIDd: 5 28 cm  LVIDs: 4 67 cm  LVLd A4C: 8 07 cm  LVLs A4C: 7 51 cm  LVPWd: 1 14 cm  RAAs A4C: 21 94 cm2  RAESV A-L: 72 45 ml  RAESV MOD: 70 61 ml  RALs: 5 64 cm  RVIDd: 4 01 cm  RWT: 0 43  SV MOD A4C: 25 09 ml  SV(Teich): 33 13 ml    CF  MR Als  Hakan: 0 39 m/s  MR Flow: 19 76 ml/s  MR Rad: 0 28 cm    CW  MR VTI: 126 65 cm  MR Vmax: 4 01 m/s  TR Vmax: 2 29 m/s  TR maxP 95 mmHg    MM  TAPSE: 1 33 cm    PW  E' Lat: 0 14 m/s  MR ERO: 0 05 cm2  MR RV: 6 25 ml    [employer] Accredited Echocardiography Laboratory    Prepared and electronically signed by    Carmela Marquez DO  Signed 05-Aug-2020 12:18:36    Results for orders placed during the hospital encounter of 21    KAYLYN (order if scheduling before 10/11/21)    [place] [address] [facility] [address]    Transesophageal Echocardiogram  2D, 3D, Doppler, and Color Doppler    Study date:  28-Sep-2021    Patient: Unique Georges  MR number: [id number]  Account number: [de-identified]  : 1965  Age: 61 years  Gender: Male  Status: Inpatient  Location: [facility]  Height: 72 in  Weight: 204 lb  BP: 115/ 76 mmHg    Indications: Atrial Fibrillation    Diagnoses: I48 0 - Atrial fibrillation    Sonographer:  MELONY Silva  Primary Physician:  Nic Mazariegos MD  Referring Physician:  Meagan Villarreal DO  Group:  [facility]  Interpreting Physician:  Meagan Villarreal DO    IMPRESSIONS:  Planned cardioversion was aborted due to possible MOLLY thrombus  SUMMARY    LEFT VENTRICLE:  The ventricle was mildly dilated  Systolic function was severely reduced  Ejection fraction was estimated in the range of 25 % to 30 %  There was severe diffuse hypokinesis  LEFT ATRIUM:  The atrium was dilated  There was evidence of spontaneous echo contrast ("smoke")  LEFT ATRIAL APPENDAGE:  There was a possible mass  There was evidence of spontaneous echo contrast ("smoke") in the appendage  ATRIAL SEPTUM:  No defect or patent foramen ovale was identified  MITRAL VALVE:  There was mild regurgitation  TRICUSPID VALVE:  There was trace to mild regurgitation  HISTORY: PRIOR HISTORY: CKD, AFIB, CMP, CHF    PROCEDURE: The study was performed in the [facility]  This was a routine study  The risks and alternatives of the procedure were explained to the patient and informed consent was obtained  The transesophageal approach was  used  The study included complete 2D imaging, 3D imaging, complete spectral Doppler, and color Doppler  The heart rate was 99 bpm, at the start of the study  An adult omniplane probe was inserted by the attending cardiologist  Images were  obtained from the parasternal, apical, subcostal, and suprasternal notch acoustic windows  Intubated with ease  One intubation attempt(s)  There was no blood detected on the probe  Image quality was adequate  There were no complications  during the procedure  LEFT VENTRICLE: The ventricle was mildly dilated  Systolic function was severely reduced  Ejection fraction was estimated in the range of 25 % to 30 %  There was severe diffuse hypokinesis  No evidence of apical thrombus  RIGHT VENTRICLE: The size was normal  Systolic function was normal     LEFT ATRIUM: The atrium was dilated  There was evidence of spontaneous echo contrast ("smoke")  APPENDAGE: The size was normal  There was a possible mass  There was evidence of spontaneous echo contrast ("smoke") in the appendage  ATRIAL SEPTUM: No defect or patent foramen ovale was identified  RIGHT ATRIUM: Size was normal     MITRAL VALVE: Valve structure was normal  There was normal leaflet separation  DOPPLER: The transmitral velocity was within the normal range  There was no evidence for stenosis  There was mild regurgitation  AORTIC VALVE: The valve was trileaflet  Leaflets exhibited normal thickness and normal cuspal separation  DOPPLER: Transaortic velocity was within the normal range  There was no evidence for stenosis  There was no significant  regurgitation  TRICUSPID VALVE: The valve structure was normal  There was normal leaflet separation  DOPPLER: There was trace to mild regurgitation  PULMONIC VALVE: Not well visualized  DOPPLER: The transpulmonic velocity was within the normal range  There was no significant regurgitation  AORTA: The root exhibited normal size  [address] [facility]    Prepared and electronically signed by    Chano Ervin DO  Signed 29-Sep-2021 15:14:04    No results found for this or any previous visit  Results for orders placed during the hospital encounter of 05/24/22    NM myocardial perfusion spect (stress and/or rest)    Interpretation Summary    Perfusion: Normal post stress perfusion  No definitive signs of ischemia on nuclear images    Stress Function: Calculated LVEF 46%  Pt with AFIB during study with numerous beats elimated from image aquisition   Given irregular cardiac intervals with AFIB, suggest ECHO for better LVEF evaluation    Deconditioned HR responce to exercise  Poor exercise tolerence            I reviewed and interpreted the following LABS/EKG/TELE/IMAGING and below is summary of my interpretation (if data available):    LABS: nl bmp    Current EKG and Rhythm Strip: Afib    Past EKGs and RHYTHM strip:Afib w RVR Feb 2020    Reviewed KAYLYN imagines, no clear thrombus, does have thick smoke in KAYLYN Feb and March 2022,     Nuclear stress yes 45, no blockages

## 2022-05-26 LAB
CHEST PAIN STATEMENT: NORMAL
MAX DIASTOLIC BP: 80 MMHG
MAX HEART RATE: 162 BPM
MAX PREDICTED HEART RATE: 163 BPM
MAX. SYSTOLIC BP: 162 MMHG
PROTOCOL NAME: NORMAL
REASON FOR TERMINATION: NORMAL
TARGET HR FORMULA: NORMAL
TEST INDICATION: NORMAL
TIME IN EXERCISE PHASE: NORMAL

## 2022-06-23 ENCOUNTER — LAB (OUTPATIENT)
Dept: LAB | Facility: HOSPITAL | Age: 57
End: 2022-06-23
Payer: COMMERCIAL

## 2022-06-23 DIAGNOSIS — I48.91 ATRIAL FIBRILLATION, UNSPECIFIED TYPE (HCC): ICD-10-CM

## 2022-06-23 LAB
ALBUMIN SERPL BCP-MCNC: 4 G/DL (ref 3.5–5)
ALP SERPL-CCNC: 84 U/L (ref 46–116)
ALT SERPL W P-5'-P-CCNC: 33 U/L (ref 12–78)
ANION GAP SERPL CALCULATED.3IONS-SCNC: 12 MMOL/L (ref 4–13)
AST SERPL W P-5'-P-CCNC: 21 U/L (ref 5–45)
BASOPHILS # BLD AUTO: 0.07 THOUSANDS/ΜL (ref 0–0.1)
BASOPHILS NFR BLD AUTO: 1 % (ref 0–1)
BILIRUB SERPL-MCNC: 0.35 MG/DL (ref 0.2–1)
BUN SERPL-MCNC: 16 MG/DL (ref 5–25)
CALCIUM SERPL-MCNC: 8.8 MG/DL (ref 8.3–10.1)
CHLORIDE SERPL-SCNC: 103 MMOL/L (ref 100–108)
CO2 SERPL-SCNC: 24 MMOL/L (ref 21–32)
CREAT SERPL-MCNC: 1.25 MG/DL (ref 0.6–1.3)
EOSINOPHIL # BLD AUTO: 0.58 THOUSAND/ΜL (ref 0–0.61)
EOSINOPHIL NFR BLD AUTO: 6 % (ref 0–6)
ERYTHROCYTE [DISTWIDTH] IN BLOOD BY AUTOMATED COUNT: 12.1 % (ref 11.6–15.1)
GFR SERPL CREATININE-BSD FRML MDRD: 63 ML/MIN/1.73SQ M
GLUCOSE SERPL-MCNC: 96 MG/DL (ref 65–140)
HCT VFR BLD AUTO: 46.4 % (ref 36.5–49.3)
HGB BLD-MCNC: 15.5 G/DL (ref 12–17)
IMM GRANULOCYTES # BLD AUTO: 0.02 THOUSAND/UL (ref 0–0.2)
IMM GRANULOCYTES NFR BLD AUTO: 0 % (ref 0–2)
LYMPHOCYTES # BLD AUTO: 3.94 THOUSANDS/ΜL (ref 0.6–4.47)
LYMPHOCYTES NFR BLD AUTO: 39 % (ref 14–44)
MCH RBC QN AUTO: 30.9 PG (ref 26.8–34.3)
MCHC RBC AUTO-ENTMCNC: 33.4 G/DL (ref 31.4–37.4)
MCV RBC AUTO: 93 FL (ref 82–98)
MONOCYTES # BLD AUTO: 0.86 THOUSAND/ΜL (ref 0.17–1.22)
MONOCYTES NFR BLD AUTO: 9 % (ref 4–12)
NEUTROPHILS # BLD AUTO: 4.56 THOUSANDS/ΜL (ref 1.85–7.62)
NEUTS SEG NFR BLD AUTO: 45 % (ref 43–75)
NRBC BLD AUTO-RTO: 0 /100 WBCS
PLATELET # BLD AUTO: 281 THOUSANDS/UL (ref 149–390)
PMV BLD AUTO: 9.8 FL (ref 8.9–12.7)
POTASSIUM SERPL-SCNC: 3.7 MMOL/L (ref 3.5–5.3)
PROT SERPL-MCNC: 7.5 G/DL (ref 6.4–8.2)
RBC # BLD AUTO: 5.01 MILLION/UL (ref 3.88–5.62)
SODIUM SERPL-SCNC: 139 MMOL/L (ref 136–145)
WBC # BLD AUTO: 10.03 THOUSAND/UL (ref 4.31–10.16)

## 2022-06-23 PROCEDURE — 85025 COMPLETE CBC W/AUTO DIFF WBC: CPT

## 2022-06-23 PROCEDURE — 36415 COLL VENOUS BLD VENIPUNCTURE: CPT

## 2022-06-23 PROCEDURE — 80053 COMPREHEN METABOLIC PANEL: CPT

## 2022-06-23 PROCEDURE — 93005 ELECTROCARDIOGRAM TRACING: CPT

## 2022-06-28 ENCOUNTER — ANESTHESIA (OUTPATIENT)
Dept: NON INVASIVE DIAGNOSTICS | Facility: HOSPITAL | Age: 57
End: 2022-06-28
Payer: COMMERCIAL

## 2022-06-28 ENCOUNTER — HOSPITAL ENCOUNTER (OUTPATIENT)
Facility: HOSPITAL | Age: 57
Setting detail: OUTPATIENT SURGERY
Discharge: HOME | End: 2022-06-29
Attending: INTERNAL MEDICINE | Admitting: INTERNAL MEDICINE
Payer: COMMERCIAL

## 2022-06-28 ENCOUNTER — APPOINTMENT (OUTPATIENT)
Dept: NON INVASIVE DIAGNOSTICS | Facility: HOSPITAL | Age: 57
End: 2022-06-28
Attending: INTERNAL MEDICINE
Payer: COMMERCIAL

## 2022-06-28 ENCOUNTER — ANESTHESIA EVENT (OUTPATIENT)
Dept: NON INVASIVE DIAGNOSTICS | Facility: HOSPITAL | Age: 57
End: 2022-06-28
Payer: COMMERCIAL

## 2022-06-28 DIAGNOSIS — I48.0 PAROXYSMAL ATRIAL FIBRILLATION (HCC): ICD-10-CM

## 2022-06-28 DIAGNOSIS — I48.91 ATRIAL FIBRILLATION, UNSPECIFIED TYPE (HCC): ICD-10-CM

## 2022-06-28 LAB
ANION GAP SERPL CALCULATED.3IONS-SCNC: 2 MMOL/L (ref 4–13)
ATRIAL RATE: 258 BPM
BASOPHILS # BLD AUTO: 0.08 THOUSANDS/ΜL (ref 0–0.1)
BASOPHILS NFR BLD AUTO: 1 % (ref 0–1)
BUN SERPL-MCNC: 19 MG/DL (ref 5–25)
CALCIUM SERPL-MCNC: 8.9 MG/DL (ref 8.3–10.1)
CHLORIDE SERPL-SCNC: 109 MMOL/L (ref 100–108)
CO2 SERPL-SCNC: 30 MMOL/L (ref 21–32)
CREAT SERPL-MCNC: 1.5 MG/DL (ref 0.6–1.3)
EOSINOPHIL # BLD AUTO: 0.58 THOUSAND/ΜL (ref 0–0.61)
EOSINOPHIL NFR BLD AUTO: 7 % (ref 0–6)
ERYTHROCYTE [DISTWIDTH] IN BLOOD BY AUTOMATED COUNT: 12.2 % (ref 11.6–15.1)
GFR SERPL CREATININE-BSD FRML MDRD: 50 ML/MIN/1.73SQ M
GLUCOSE P FAST SERPL-MCNC: 89 MG/DL (ref 65–99)
GLUCOSE SERPL-MCNC: 89 MG/DL (ref 65–140)
HCT VFR BLD AUTO: 48.9 % (ref 36.5–49.3)
HGB BLD-MCNC: 15.6 G/DL (ref 12–17)
IMM GRANULOCYTES # BLD AUTO: 0.02 THOUSAND/UL (ref 0–0.2)
IMM GRANULOCYTES NFR BLD AUTO: 0 % (ref 0–2)
INR PPP: 1.11 (ref 0.84–1.19)
KCT BLD-ACNC: 309 SEC (ref 89–137)
KCT BLD-ACNC: 316 SEC (ref 89–137)
KCT BLD-ACNC: 336 SEC (ref 89–137)
LYMPHOCYTES # BLD AUTO: 2.99 THOUSANDS/ΜL (ref 0.6–4.47)
LYMPHOCYTES NFR BLD AUTO: 35 % (ref 14–44)
MCH RBC QN AUTO: 29.9 PG (ref 26.8–34.3)
MCHC RBC AUTO-ENTMCNC: 31.9 G/DL (ref 31.4–37.4)
MCV RBC AUTO: 94 FL (ref 82–98)
MONOCYTES # BLD AUTO: 0.76 THOUSAND/ΜL (ref 0.17–1.22)
MONOCYTES NFR BLD AUTO: 9 % (ref 4–12)
NEUTROPHILS # BLD AUTO: 4.06 THOUSANDS/ΜL (ref 1.85–7.62)
NEUTS SEG NFR BLD AUTO: 48 % (ref 43–75)
NRBC BLD AUTO-RTO: 0 /100 WBCS
PLATELET # BLD AUTO: 276 THOUSANDS/UL (ref 149–390)
PMV BLD AUTO: 10.1 FL (ref 8.9–12.7)
POTASSIUM SERPL-SCNC: 4 MMOL/L (ref 3.5–5.3)
PROTHROMBIN TIME: 13.9 SECONDS (ref 11.6–14.5)
QRS AXIS: 32 DEGREES
QRSD INTERVAL: 100 MS
QT INTERVAL: 386 MS
QTC INTERVAL: 453 MS
RBC # BLD AUTO: 5.22 MILLION/UL (ref 3.88–5.62)
SL CV LV EF: 45
SODIUM SERPL-SCNC: 141 MMOL/L (ref 136–145)
SPECIMEN SOURCE: ABNORMAL
T WAVE AXIS: 77 DEGREES
VENTRICULAR RATE: 83 BPM
WBC # BLD AUTO: 8.49 THOUSAND/UL (ref 4.31–10.16)

## 2022-06-28 PROCEDURE — C1733 CATH, EP, OTHR THAN COOL-TIP: HCPCS | Performed by: INTERNAL MEDICINE

## 2022-06-28 PROCEDURE — 93325 DOPPLER ECHO COLOR FLOW MAPG: CPT | Performed by: INTERNAL MEDICINE

## 2022-06-28 PROCEDURE — 93657 TX L/R ATRIAL FIB ADDL: CPT | Performed by: INTERNAL MEDICINE

## 2022-06-28 PROCEDURE — C1894 INTRO/SHEATH, NON-LASER: HCPCS | Performed by: INTERNAL MEDICINE

## 2022-06-28 PROCEDURE — C1769 GUIDE WIRE: HCPCS | Performed by: INTERNAL MEDICINE

## 2022-06-28 PROCEDURE — 93312 ECHO TRANSESOPHAGEAL: CPT | Performed by: INTERNAL MEDICINE

## 2022-06-28 PROCEDURE — C1730 CATH, EP, 19 OR FEW ELECT: HCPCS | Performed by: INTERNAL MEDICINE

## 2022-06-28 PROCEDURE — 80048 BASIC METABOLIC PNL TOTAL CA: CPT | Performed by: PHYSICIAN ASSISTANT

## 2022-06-28 PROCEDURE — 93321 DOPPLER ECHO F-UP/LMTD STD: CPT | Performed by: INTERNAL MEDICINE

## 2022-06-28 PROCEDURE — 93656 COMPRE EP EVAL ABLTJ ATR FIB: CPT | Performed by: INTERNAL MEDICINE

## 2022-06-28 PROCEDURE — 85610 PROTHROMBIN TIME: CPT | Performed by: PHYSICIAN ASSISTANT

## 2022-06-28 PROCEDURE — 85347 COAGULATION TIME ACTIVATED: CPT

## 2022-06-28 PROCEDURE — NC001 PR NO CHARGE: Performed by: PHYSICIAN ASSISTANT

## 2022-06-28 PROCEDURE — 92960 CARDIOVERSION ELECTRIC EXT: CPT

## 2022-06-28 PROCEDURE — C1893 INTRO/SHEATH, FIXED,NON-PEEL: HCPCS | Performed by: INTERNAL MEDICINE

## 2022-06-28 PROCEDURE — 93010 ELECTROCARDIOGRAM REPORT: CPT | Performed by: INTERNAL MEDICINE

## 2022-06-28 PROCEDURE — 93005 ELECTROCARDIOGRAM TRACING: CPT

## 2022-06-28 PROCEDURE — C1759 CATH, INTRA ECHOCARDIOGRAPHY: HCPCS | Performed by: INTERNAL MEDICINE

## 2022-06-28 PROCEDURE — C9113 INJ PANTOPRAZOLE SODIUM, VIA: HCPCS | Performed by: PHYSICIAN ASSISTANT

## 2022-06-28 PROCEDURE — 93312 ECHO TRANSESOPHAGEAL: CPT

## 2022-06-28 PROCEDURE — C1732 CATH, EP, DIAG/ABL, 3D/VECT: HCPCS | Performed by: INTERNAL MEDICINE

## 2022-06-28 PROCEDURE — 85025 COMPLETE CBC W/AUTO DIFF WBC: CPT | Performed by: PHYSICIAN ASSISTANT

## 2022-06-28 PROCEDURE — 92960 CARDIOVERSION ELECTRIC EXT: CPT | Performed by: INTERNAL MEDICINE

## 2022-06-28 RX ORDER — ACETAMINOPHEN 325 MG/1
650 TABLET ORAL EVERY 4 HOURS PRN
Status: DISCONTINUED | OUTPATIENT
Start: 2022-06-28 | End: 2022-06-29 | Stop reason: HOSPADM

## 2022-06-28 RX ORDER — ONDANSETRON 2 MG/ML
4 INJECTION INTRAMUSCULAR; INTRAVENOUS ONCE AS NEEDED
Status: DISCONTINUED | OUTPATIENT
Start: 2022-06-28 | End: 2022-06-28 | Stop reason: HOSPADM

## 2022-06-28 RX ORDER — FENTANYL CITRATE 50 UG/ML
INJECTION, SOLUTION INTRAMUSCULAR; INTRAVENOUS AS NEEDED
Status: DISCONTINUED | OUTPATIENT
Start: 2022-06-28 | End: 2022-06-28

## 2022-06-28 RX ORDER — PANTOPRAZOLE SODIUM 40 MG/10ML
40 INJECTION, POWDER, LYOPHILIZED, FOR SOLUTION INTRAVENOUS ONCE
Status: COMPLETED | OUTPATIENT
Start: 2022-06-28 | End: 2022-06-28

## 2022-06-28 RX ORDER — PANTOPRAZOLE SODIUM 40 MG/1
40 TABLET, DELAYED RELEASE ORAL DAILY
Status: DISCONTINUED | OUTPATIENT
Start: 2022-06-29 | End: 2022-06-29 | Stop reason: HOSPADM

## 2022-06-28 RX ORDER — HEPARIN SODIUM 1000 [USP'U]/ML
INJECTION, SOLUTION INTRAVENOUS; SUBCUTANEOUS AS NEEDED
Status: DISCONTINUED | OUTPATIENT
Start: 2022-06-28 | End: 2022-06-28 | Stop reason: HOSPADM

## 2022-06-28 RX ORDER — ONDANSETRON 2 MG/ML
4 INJECTION INTRAMUSCULAR; INTRAVENOUS EVERY 6 HOURS PRN
Status: DISCONTINUED | OUTPATIENT
Start: 2022-06-28 | End: 2022-06-29 | Stop reason: HOSPADM

## 2022-06-28 RX ORDER — LIDOCAINE HYDROCHLORIDE 10 MG/ML
INJECTION, SOLUTION EPIDURAL; INFILTRATION; INTRACAUDAL; PERINEURAL AS NEEDED
Status: DISCONTINUED | OUTPATIENT
Start: 2022-06-28 | End: 2022-06-28

## 2022-06-28 RX ORDER — SODIUM CHLORIDE 9 MG/ML
INJECTION, SOLUTION INTRAVENOUS CONTINUOUS PRN
Status: DISCONTINUED | OUTPATIENT
Start: 2022-06-28 | End: 2022-06-28

## 2022-06-28 RX ORDER — OXYCODONE HYDROCHLORIDE 10 MG/1
10 TABLET ORAL EVERY 4 HOURS PRN
Status: DISCONTINUED | OUTPATIENT
Start: 2022-06-28 | End: 2022-06-29 | Stop reason: HOSPADM

## 2022-06-28 RX ORDER — SUCCINYLCHOLINE/SOD CL,ISO/PF 100 MG/5ML
SYRINGE (ML) INTRAVENOUS AS NEEDED
Status: DISCONTINUED | OUTPATIENT
Start: 2022-06-28 | End: 2022-06-28

## 2022-06-28 RX ORDER — METOPROLOL SUCCINATE 100 MG/1
100 TABLET, EXTENDED RELEASE ORAL 2 TIMES DAILY
Status: DISCONTINUED | OUTPATIENT
Start: 2022-06-28 | End: 2022-06-29 | Stop reason: HOSPADM

## 2022-06-28 RX ORDER — DEXAMETHASONE SODIUM PHOSPHATE 10 MG/ML
INJECTION, SOLUTION INTRAMUSCULAR; INTRAVENOUS AS NEEDED
Status: DISCONTINUED | OUTPATIENT
Start: 2022-06-28 | End: 2022-06-28

## 2022-06-28 RX ORDER — PROPOFOL 10 MG/ML
INJECTION, EMULSION INTRAVENOUS AS NEEDED
Status: DISCONTINUED | OUTPATIENT
Start: 2022-06-28 | End: 2022-06-28

## 2022-06-28 RX ORDER — ROCURONIUM BROMIDE 10 MG/ML
INJECTION, SOLUTION INTRAVENOUS AS NEEDED
Status: DISCONTINUED | OUTPATIENT
Start: 2022-06-28 | End: 2022-06-28

## 2022-06-28 RX ORDER — ATORVASTATIN CALCIUM 40 MG/1
40 TABLET, FILM COATED ORAL
Status: DISCONTINUED | OUTPATIENT
Start: 2022-06-28 | End: 2022-06-29 | Stop reason: HOSPADM

## 2022-06-28 RX ORDER — FENTANYL CITRATE/PF 50 MCG/ML
25 SYRINGE (ML) INJECTION
Status: DISCONTINUED | OUTPATIENT
Start: 2022-06-28 | End: 2022-06-28 | Stop reason: HOSPADM

## 2022-06-28 RX ORDER — MIDAZOLAM HYDROCHLORIDE 2 MG/2ML
INJECTION, SOLUTION INTRAMUSCULAR; INTRAVENOUS AS NEEDED
Status: DISCONTINUED | OUTPATIENT
Start: 2022-06-28 | End: 2022-06-28

## 2022-06-28 RX ORDER — AMIODARONE HYDROCHLORIDE 200 MG/1
200 TABLET ORAL DAILY
Status: DISCONTINUED | OUTPATIENT
Start: 2022-06-29 | End: 2022-06-29 | Stop reason: HOSPADM

## 2022-06-28 RX ORDER — PROTAMINE SULFATE 10 MG/ML
INJECTION, SOLUTION INTRAVENOUS AS NEEDED
Status: DISCONTINUED | OUTPATIENT
Start: 2022-06-28 | End: 2022-06-28

## 2022-06-28 RX ORDER — DOCUSATE SODIUM 100 MG/1
100 CAPSULE, LIQUID FILLED ORAL 2 TIMES DAILY
Status: DISCONTINUED | OUTPATIENT
Start: 2022-06-28 | End: 2022-06-29 | Stop reason: HOSPADM

## 2022-06-28 RX ORDER — ROPINIROLE 0.25 MG/1
0.5 TABLET, FILM COATED ORAL
Status: DISCONTINUED | OUTPATIENT
Start: 2022-06-28 | End: 2022-06-29 | Stop reason: HOSPADM

## 2022-06-28 RX ORDER — SODIUM CHLORIDE 9 MG/ML
125 INJECTION, SOLUTION INTRAVENOUS CONTINUOUS
Status: DISCONTINUED | OUTPATIENT
Start: 2022-06-28 | End: 2022-06-29 | Stop reason: HOSPADM

## 2022-06-28 RX ORDER — DILTIAZEM HYDROCHLORIDE 240 MG/1
240 CAPSULE, COATED, EXTENDED RELEASE ORAL DAILY
Status: CANCELLED | OUTPATIENT
Start: 2022-06-28

## 2022-06-28 RX ORDER — NOREPINEPHRINE BITARTRATE 1 MG/ML
INJECTION, SOLUTION INTRAVENOUS AS NEEDED
Status: DISCONTINUED | OUTPATIENT
Start: 2022-06-28 | End: 2022-06-28

## 2022-06-28 RX ORDER — HEPARIN SODIUM 10000 [USP'U]/100ML
INJECTION, SOLUTION INTRAVENOUS
Status: DISCONTINUED | OUTPATIENT
Start: 2022-06-28 | End: 2022-06-28 | Stop reason: HOSPADM

## 2022-06-28 RX ADMIN — NOREPINEPHRINE BITARTRATE 8 MCG: 1 INJECTION, SOLUTION, CONCENTRATE INTRAVENOUS at 14:14

## 2022-06-28 RX ADMIN — PROPOFOL 50 MG: 10 INJECTION, EMULSION INTRAVENOUS at 13:03

## 2022-06-28 RX ADMIN — NOREPINEPHRINE BITARTRATE 8 MCG: 1 INJECTION, SOLUTION, CONCENTRATE INTRAVENOUS at 13:54

## 2022-06-28 RX ADMIN — SODIUM CHLORIDE: 0.9 INJECTION, SOLUTION INTRAVENOUS at 13:21

## 2022-06-28 RX ADMIN — FENTANYL CITRATE 50 MCG: 50 INJECTION INTRAMUSCULAR; INTRAVENOUS at 13:23

## 2022-06-28 RX ADMIN — MIDAZOLAM 2 MG: 1 INJECTION INTRAMUSCULAR; INTRAVENOUS at 13:01

## 2022-06-28 RX ADMIN — OXYCODONE HYDROCHLORIDE 10 MG: 10 TABLET ORAL at 22:10

## 2022-06-28 RX ADMIN — Medication 200 MCG: at 13:52

## 2022-06-28 RX ADMIN — PANTOPRAZOLE SODIUM 40 MG: 40 INJECTION, POWDER, FOR SOLUTION INTRAVENOUS at 14:03

## 2022-06-28 RX ADMIN — Medication 200 MCG: at 14:11

## 2022-06-28 RX ADMIN — METOPROLOL SUCCINATE 100 MG: 100 TABLET, EXTENDED RELEASE ORAL at 17:43

## 2022-06-28 RX ADMIN — Medication 100 MCG: at 13:45

## 2022-06-28 RX ADMIN — MAGNESIUM OXIDE TAB 400 MG (241.3 MG ELEMENTAL MG) 400 MG: 400 (241.3 MG) TAB at 17:43

## 2022-06-28 RX ADMIN — LIDOCAINE HYDROCHLORIDE 50 MG: 10 INJECTION, SOLUTION EPIDURAL; INFILTRATION; INTRACAUDAL at 13:01

## 2022-06-28 RX ADMIN — ATORVASTATIN CALCIUM 40 MG: 40 TABLET, FILM COATED ORAL at 17:43

## 2022-06-28 RX ADMIN — Medication 5 MG: at 13:50

## 2022-06-28 RX ADMIN — DEXAMETHASONE SODIUM PHOSPHATE 10 MG: 10 INJECTION, SOLUTION INTRAMUSCULAR; INTRAVENOUS at 13:48

## 2022-06-28 RX ADMIN — PHENYLEPHRINE HYDROCHLORIDE 50 MCG/MIN: 10 INJECTION INTRAVENOUS at 14:33

## 2022-06-28 RX ADMIN — NOREPINEPHRINE BITARTRATE 8 MCG: 1 INJECTION, SOLUTION, CONCENTRATE INTRAVENOUS at 14:04

## 2022-06-28 RX ADMIN — Medication 200 MCG: at 14:24

## 2022-06-28 RX ADMIN — ROPINIROLE 0.5 MG: 0.25 TABLET, FILM COATED ORAL at 21:42

## 2022-06-28 RX ADMIN — FENTANYL CITRATE 50 MCG: 50 INJECTION INTRAMUSCULAR; INTRAVENOUS at 13:20

## 2022-06-28 RX ADMIN — SODIUM CHLORIDE: 0.9 INJECTION, SOLUTION INTRAVENOUS at 12:45

## 2022-06-28 RX ADMIN — PROPOFOL 150 MG: 10 INJECTION, EMULSION INTRAVENOUS at 13:01

## 2022-06-28 RX ADMIN — Medication 10 MG: at 13:44

## 2022-06-28 RX ADMIN — PROPOFOL 50 MG: 10 INJECTION, EMULSION INTRAVENOUS at 13:23

## 2022-06-28 RX ADMIN — PROTAMINE SULFATE 50 MG: 10 INJECTION, SOLUTION INTRAVENOUS at 15:50

## 2022-06-28 RX ADMIN — Medication 15 MG: at 13:47

## 2022-06-28 RX ADMIN — APIXABAN 5 MG: 5 TABLET, FILM COATED ORAL at 17:43

## 2022-06-28 RX ADMIN — Medication 10 MG: at 13:39

## 2022-06-28 RX ADMIN — OXYCODONE HYDROCHLORIDE 10 MG: 10 TABLET ORAL at 17:59

## 2022-06-28 RX ADMIN — Medication 10 MG: at 14:26

## 2022-06-28 RX ADMIN — PROPOFOL 150 MG: 10 INJECTION, EMULSION INTRAVENOUS at 13:05

## 2022-06-28 RX ADMIN — SODIUM CHLORIDE 125 ML/HR: 0.9 INJECTION, SOLUTION INTRAVENOUS at 16:36

## 2022-06-28 RX ADMIN — ROCURONIUM BROMIDE 10 MG: 50 INJECTION, SOLUTION INTRAVENOUS at 13:02

## 2022-06-28 RX ADMIN — Medication 100 MCG: at 13:47

## 2022-06-28 RX ADMIN — Medication 100 MG: at 13:06

## 2022-06-28 RX ADMIN — Medication 10 MG: at 13:33

## 2022-06-28 NOTE — H&P
H&P Exam - Cardiology   Kathleen Lynne 61 y o  male MRN: [id number]  Unit/Bed#: BE CATH LAB ROOM Encounter: [id number]    Assessment/Plan :  1) Persistent afib- h/o tachycardia related myopathy  Post DCCv March 2022 back in afib April 19 2022  LA Diameter 4 7cm  He had KAYLYN's Nov 2021 and Feb 2022 w LA thrombus  RF: used to drink but quit alcohol  Moderate ALVARO not on CPAP     2) Tachycardia related myopathy EF 40% most recently was up to 60 % after DCCV Aug 2020 (at that time EF 25%)  On dilt and metoprolol       Plan for afib ablation cryo, w post wall isolation      History of Present Illness   HPI:  Kathleen Lynne is a 61y o  year old male with a PMH as stated above, who presents to [facility] EP lab today to undergo elective afib ablation      Patient was seen in consultation by EP in May 2022 for history of persistent atrial fibrillation and tachy-mediated cardiomyopathy  EF down as low as 20% 2018, normal cath, EF improed nto normal, then had recurrent afib and DCCV again , started on amiodarone Aug 2020, EF was again 25-30 and again came up  Then back in afib again planned KAYLYN Nov, but possible LA thrombus, put on Eliquis, had another KAYLYN Feb 2022 still thrombus, so finally March 2022 KAYLYN-done and reviewed w Dr Kvng Rollins and no thrmobus so had DCCV but unfortunately afib recurred one month later, EF now about 40%  Rates in afib 90s  He has quit ETOH , awaiting Sleep clinic appt for CPAP  He does not feel palpitations but has decreased exercise tolerance in afib  Patient denies chest pain/heaviness/tightness/pressure, palpitations, shortness of breath, orthopnea, lightheadedness, presyncope, syncope or N/V  EKG today shows afib with HR 80s  Review of Systems   All other systems reviewed and are negative            Historical Information   Past Medical History:   Diagnosis Date    [facility] [address] [facility]     Hyperlipidemia     Hypertension     Overweight     Systolic heart failure ([facility] [address] [facility]     Tachycardia induced cardiomyopathy ([facility] [address] )        Past Surgical History:   Procedure Laterality Date    CARDIAC CATHETERIZATION  02/22/2018    CARDIOVERSION  03/2018    CARDIOVERSION  08/06/2020    INGUINAL HERNIA REPAIR      SINUS SURGERY      WISDOM TOOTH EXTRACTION         Family History   Problem Relation Age of Onset    No Known Problems Mother     Colon cancer Father        Social History   Social History     Substance and Sexual Activity   Alcohol Use Not Currently    Comment: none currently     Social History     Substance and Sexual Activity   Drug Use Never     Social History     Tobacco Use   Smoking Status Former Smoker    Types: Cigarettes   Smokeless Tobacco Never Used         Meds/Allergies   all medications and allergies reviewed  Home Medications:   Medications Prior to Admission   Medication    amiodarone 200 mg tablet    apixaban (ELIQUIS) 5 mg    atorvastatin (LIPITOR) 40 mg tablet    digoxin (LANOXIN) 0 125 mg tablet    diltiazem (CARDIZEM CD) 240 mg 24 hr capsule    magnesium oxide (MAG-OX) 400 mg    metoprolol succinate (TOPROL-XL) 100 mg 24 hr tablet    metoprolol succinate (TOPROL-XL) 25 mg 24 hr tablet    rOPINIRole (REQUIP) 0 5 mg tablet       No Known Allergies    Objective   Vitals: Blood pressure 129/99, pulse 83, temperature 98 1 °F (36 7 °C), temperature source Oral, resp  rate 17, height 6' 1" (1 854 m), weight 99 3 kg (219 lb), SpO2 99 %  Orthostatic Blood Pressures    Flowsheet Row Most Recent Value   Blood Pressure 129/99 filed at 06/28/2022 1322          No intake or output data in the 24 hours ending 06/28/22 1049    Invasive Devices  Report    Peripheral Intravenous Line  Duration           Peripheral IV 06/28/22 Left Forearm <1 day    Peripheral IV 06/28/22 Right Antecubital <1 day                Physical Exam  Vitals reviewed  Constitutional:       General: He is not in acute distress  Appearance: He is not ill-appearing or diaphoretic     HENT: Head: Normocephalic and atraumatic  Right Ear: External ear normal       Left Ear: External ear normal       Nose: Nose normal    Eyes:      General:         Right eye: No discharge  Left eye: No discharge  Cardiovascular:      Rate and Rhythm: Normal rate  Rhythm irregular  Heart sounds: No murmur heard  No friction rub  Pulmonary:      Effort: Pulmonary effort is normal       Breath sounds: Normal breath sounds  No wheezing, rhonchi or rales  Abdominal:      General: There is no distension  Palpations: Abdomen is soft  Tenderness: There is no abdominal tenderness  Musculoskeletal:         General: No deformity or signs of injury  Cervical back: No rigidity  No muscular tenderness  Right lower leg: No edema  Left lower leg: No edema  Skin:     General: Skin is warm and dry  Capillary Refill: Capillary refill takes less than 2 seconds  Coloration: Skin is not jaundiced or pale  Neurological:      General: No focal deficit present  Mental Status: He is alert and oriented to person, place, and time  Mental status is at baseline  Psychiatric:         Mood and Affect: Mood normal          Behavior: Behavior normal          Thought Content: Thought content normal              Lab Results: I have personally reviewed pertinent lab results      Results from last 7 days   Lab Units 06/28/22  0850 06/23/22  1703   WBC Thousand/uL 8 49 10 03   HEMOGLOBIN g/dL 15 6 15 5   HEMATOCRIT % 48 9 46 4   PLATELETS Thousands/uL 276 281     Results from last 7 days   Lab Units 06/28/22  0850 06/23/22  1703   POTASSIUM mmol/L 4 0 3 7   CHLORIDE mmol/L 109* 103   CO2 mmol/L 30 24   BUN mg/dL 19 16   CREATININE mg/dL 1 50* 1 25   CALCIUM mg/dL 8 9 8 8     Results from last 7 days   Lab Units 06/28/22  0850   INR  1 11             Imaging: I have personally reviewed pertinent films in PACS  KAYLYN: 3/16/2022    Left Ventricle: Left ventricular cavity size is normal  Wall thickness is normal  The left ventricular ejection fraction is 40%  Systolic function is moderately reduced  There is moderate global hypokinesis    Right Ventricle: Systolic function is mildly reduced    Left Atrium: The atrium is mildly dilated  There is no thrombus  There is moderate, continuous spontaneous echo contrast     Right Atrium: The atrium is mildly dilated    Atrial Septum: No patent foramen ovale confirmed at rest by color flow Doppler    Left Atrial Appendage: There is normal function  There is no thrombus   There is moderate spontaneous echo contrast       Code Status: Level 1 - Full Code

## 2022-06-28 NOTE — ANESTHESIA PREPROCEDURE EVALUATION
Procedure:  Cardiac eps/afib ablation post wall (N/A Chest)    Relevant Problems   CARDIO   (+) Benign essential HTN   (+) Chronic systolic congestive heart failure (HCC)   (+) Mixed hyperlipidemia   (+) Paroxysmal atrial fibrillation (HCC)      /RENAL   (+) Stage 3b chronic kidney disease (HCC)      PULMONARY   (+) ALVARO (obstructive sleep apnea)      Cardiovascular and Mediastinum   (+) Cardiomyopathy (HCC)      EF 46% on stress test 5/24/22    ALVARO, not on CPAP yet  Physical Exam    Airway  Comment: +beard  Mallampati score: II  TM Distance: >3 FB  Neck ROM: full     Dental   No notable dental hx     Cardiovascular      Pulmonary      Other Findings        Anesthesia Plan  ASA Score- 3     Anesthesia Type- general with ASA Monitors  Additional Monitors:   Airway Plan:           Plan Factors-    Chart reviewed  Patient summary reviewed  Induction- intravenous  Postoperative Plan-   Planned trial extubation    Informed Consent- Anesthetic plan and risks discussed with patient  I personally reviewed this patient with the CRNA  Discussed and agreed on the Anesthesia Plan with the CRNA  Elif Shannon

## 2022-06-28 NOTE — ANESTHESIA POSTPROCEDURE EVALUATION
Post-Op Assessment Note    CV Status:  Stable  Pain Score: 0    Pain management: adequate     Mental Status:  Alert and awake   Hydration Status:  Euvolemic and stable   PONV Controlled:  Controlled   Airway Patency:  Patent and adequate      Post Op Vitals Reviewed: Yes      Staff: CRNA         No complications documented      BP      Temp      Pulse     Resp      SpO2

## 2022-06-28 NOTE — LETTER
[address] [facility] [address]  [address] [address]  Dept: [phone]    June 29, 2022     Patient: Alison Pollack   YOB: 1965   Date of Visit: 6/29/2022       To Whom it May Concern:    Krista Nathan is under my professional care  He was seen in the hospital from 6/28/2022 to 6/29/2022  He is able to return to work on 7/11/2022 with no restrictions  If you have any questions or concerns, please don't hesitate to call [phone]           Sincerely,          Dipak Kat PA-C

## 2022-06-29 VITALS
HEART RATE: 76 BPM | HEIGHT: 73 IN | BODY MASS INDEX: 29.03 KG/M2 | RESPIRATION RATE: 20 BRPM | WEIGHT: 219 LBS | OXYGEN SATURATION: 95 % | TEMPERATURE: 97.7 F | DIASTOLIC BLOOD PRESSURE: 72 MMHG | SYSTOLIC BLOOD PRESSURE: 107 MMHG

## 2022-06-29 LAB
ANION GAP SERPL CALCULATED.3IONS-SCNC: 8 MMOL/L (ref 4–13)
ATRIAL RATE: 49 BPM
ATRIAL RATE: 67 BPM
ATRIAL RATE: 68 BPM
BUN SERPL-MCNC: 16 MG/DL (ref 5–25)
CALCIUM SERPL-MCNC: 8.3 MG/DL (ref 8.3–10.1)
CHLORIDE SERPL-SCNC: 105 MMOL/L (ref 100–108)
CO2 SERPL-SCNC: 25 MMOL/L (ref 21–32)
CREAT SERPL-MCNC: 1.13 MG/DL (ref 0.6–1.3)
ERYTHROCYTE [DISTWIDTH] IN BLOOD BY AUTOMATED COUNT: 12.1 % (ref 11.6–15.1)
GFR SERPL CREATININE-BSD FRML MDRD: 71 ML/MIN/1.73SQ M
GLUCOSE SERPL-MCNC: 156 MG/DL (ref 65–140)
HCT VFR BLD AUTO: 44.1 % (ref 36.5–49.3)
HGB BLD-MCNC: 14.1 G/DL (ref 12–17)
MCH RBC QN AUTO: 30.8 PG (ref 26.8–34.3)
MCHC RBC AUTO-ENTMCNC: 32 G/DL (ref 31.4–37.4)
MCV RBC AUTO: 96 FL (ref 82–98)
P AXIS: 50 DEGREES
P AXIS: 54 DEGREES
P AXIS: 58 DEGREES
PLATELET # BLD AUTO: 248 THOUSANDS/UL (ref 149–390)
PMV BLD AUTO: 10.2 FL (ref 8.9–12.7)
POTASSIUM SERPL-SCNC: 4.3 MMOL/L (ref 3.5–5.3)
PR INTERVAL: 186 MS
PR INTERVAL: 196 MS
PR INTERVAL: 196 MS
QRS AXIS: 37 DEGREES
QRS AXIS: 48 DEGREES
QRS AXIS: 54 DEGREES
QRSD INTERVAL: 96 MS
QRSD INTERVAL: 98 MS
QRSD INTERVAL: 98 MS
QT INTERVAL: 402 MS
QT INTERVAL: 408 MS
QT INTERVAL: 471 MS
QTC INTERVAL: 426 MS
QTC INTERVAL: 427 MS
QTC INTERVAL: 431 MS
RBC # BLD AUTO: 4.58 MILLION/UL (ref 3.88–5.62)
SODIUM SERPL-SCNC: 138 MMOL/L (ref 136–145)
T WAVE AXIS: 63 DEGREES
T WAVE AXIS: 74 DEGREES
T WAVE AXIS: 81 DEGREES
VENTRICULAR RATE: 49 BPM
VENTRICULAR RATE: 67 BPM
VENTRICULAR RATE: 68 BPM
WBC # BLD AUTO: 14.07 THOUSAND/UL (ref 4.31–10.16)

## 2022-06-29 PROCEDURE — 85027 COMPLETE CBC AUTOMATED: CPT | Performed by: PHYSICIAN ASSISTANT

## 2022-06-29 PROCEDURE — 93010 ELECTROCARDIOGRAM REPORT: CPT | Performed by: INTERNAL MEDICINE

## 2022-06-29 PROCEDURE — NC001 PR NO CHARGE: Performed by: PHYSICIAN ASSISTANT

## 2022-06-29 PROCEDURE — 93005 ELECTROCARDIOGRAM TRACING: CPT

## 2022-06-29 PROCEDURE — 80048 BASIC METABOLIC PNL TOTAL CA: CPT | Performed by: PHYSICIAN ASSISTANT

## 2022-06-29 RX ORDER — PANTOPRAZOLE SODIUM 40 MG/1
40 TABLET, DELAYED RELEASE ORAL DAILY
Qty: 30 TABLET | Refills: 0 | Status: SHIPPED | OUTPATIENT
Start: 2022-06-30 | End: 2022-07-26

## 2022-06-29 RX ADMIN — DOCUSATE SODIUM 100 MG: 100 CAPSULE, LIQUID FILLED ORAL at 08:53

## 2022-06-29 RX ADMIN — OXYCODONE HYDROCHLORIDE 10 MG: 10 TABLET ORAL at 08:57

## 2022-06-29 RX ADMIN — APIXABAN 5 MG: 5 TABLET, FILM COATED ORAL at 08:53

## 2022-06-29 RX ADMIN — MAGNESIUM OXIDE TAB 400 MG (241.3 MG ELEMENTAL MG) 400 MG: 400 (241.3 MG) TAB at 08:53

## 2022-06-29 RX ADMIN — METOPROLOL SUCCINATE 100 MG: 100 TABLET, EXTENDED RELEASE ORAL at 08:53

## 2022-06-29 RX ADMIN — PANTOPRAZOLE SODIUM 40 MG: 40 TABLET, DELAYED RELEASE ORAL at 08:53

## 2022-06-29 RX ADMIN — AMIODARONE HYDROCHLORIDE 200 MG: 200 TABLET ORAL at 08:53

## 2022-06-29 NOTE — DISCHARGE SUMMARY
Discharge Summary - Angela Hammer 61 y o  male MRN: [id number]    [facility] Encounter: [id number]      Admission Date: 6/28/2022   Discharge Date: 6/29/2022    Discharge Diagnosis:   1) Persistent afib- h/o tachycardia related myopathy  Post DCCv March 2022 back in afib April 19 2022   LA Diameter 4 7cm  He had KAYLYN's Nov 2021 and Feb 2022 w LA thrombus     RF: used to drink but quit alcohol  Moderate ALVARO not on CPAP     2) Tachycardia related myopathy EF 40% most recently was up to 60 % after DCCV Aug 2020 (at that time EF 25%)  On dilt and metoprolol     Procedures Performed:   1  Atrial fibrillation ablation using Cryoablation  2  Left atrial posterior wall isolation to treat afib  3  3-D map with NAVX   4  Intracardiac Echocardiography ICE  5  Left atrial pacing and recording  6  DC Cardioversion, was in afib at baseline    Orders Placed This Encounter   Procedures    Cardiac ep lab eps/ablations       Consultants: None    HPI: Please refer to the initial history and physical as well as procedure notes for full details  Briefly, Angela Hammer is a 61y o  year old male with PMH significant for persistent atrial fibrillation  He was seen by Dr Maile Calvo as an outpatient, and ablation was recommended  He presented this hospital admission to undergo this procedure  Hospital Course: Angela Hammer presented at his baseline state of health  After the procedure was explained in detail and consent was obtained, he underwent procedure without complications    Please see operative notes by Dr Maile Calvo for full details  He tolerated the procedure well  He was then monitored overnight for further observation  There were no acute issues or events overnight  The following morning he denied chest pain/heaviness/tightness/pressure, palpitations, shortness of breath, orthopnea, lightheadedness, presyncope, syncope or N/V  His vital signs were reviewed and labs are stable   Telemetry showed NSR with no events  His groins were soft without significant hematoma or recurrent bleeding  Groin sutures were removed without incident  Physical exam on the day of discharge was as follows:  Physical Exam  Vitals reviewed  Constitutional:       General: He is not in acute distress  Appearance: He is not ill-appearing or diaphoretic  HENT:      Head: Normocephalic and atraumatic  Right Ear: External ear normal       Left Ear: External ear normal       Nose: Nose normal    Eyes:      General:         Right eye: No discharge  Left eye: No discharge  Cardiovascular:      Rate and Rhythm: Normal rate and regular rhythm  Heart sounds: No murmur heard  No friction rub  Pulmonary:      Effort: Pulmonary effort is normal       Breath sounds: Normal breath sounds  No wheezing, rhonchi or rales  Abdominal:      General: There is no distension  Palpations: Abdomen is soft  Tenderness: There is no abdominal tenderness  Musculoskeletal:         General: No deformity or signs of injury  Cervical back: No rigidity  No muscular tenderness  Right lower leg: No edema  Left lower leg: No edema  Skin:     General: Skin is warm and dry  Capillary Refill: Capillary refill takes less than 2 seconds  Coloration: Skin is not jaundiced or pale  Neurological:      General: No focal deficit present  Mental Status: He is alert and oriented to person, place, and time  Mental status is at baseline  Psychiatric:         Mood and Affect: Mood normal          Behavior: Behavior normal          Thought Content: Thought content normal          He was given routine post ablation discharge instructions and restrictions, and these were explained in detail  He was given a follow up appointment with Vega Bashir PA-C, and he was instructed to follow up with his primary cardiologist as previously instructed      In terms of his medications, digoxin and diltiazem were discontinued  Troprol XL was decreased to 100 mg BID  He was prescribed 30 days of Protonix 40 mg QD  He is stable for discharge at this time with all questions answered  He was discussed in detail with Dr Melina Rangel who is in agreement with this discharge summary  Discharge Medications:  See after visit summary for reconciled discharge medications provided to patient and family  Medications Prior to Admission   Medication    amiodarone 200 mg tablet    apixaban (ELIQUIS) 5 mg    atorvastatin (LIPITOR) 40 mg tablet    digoxin (LANOXIN) 0 125 mg tablet    diltiazem (CARDIZEM CD) 240 mg 24 hr capsule    magnesium oxide (MAG-OX) 400 mg    metoprolol succinate (TOPROL-XL) 100 mg 24 hr tablet    metoprolol succinate (TOPROL-XL) 25 mg 24 hr tablet    rOPINIRole (REQUIP) 0 5 mg tablet         [facility] Labs/diagnostics:  CBC with diff:   Results from last 7 days   Lab Units 06/29/22  0459 06/28/22  0850 06/23/22  1703   WBC Thousand/uL 14 07* 8 49 10 03   HEMOGLOBIN g/dL 14 1 15 6 15 5   HEMATOCRIT % 44 1 48 9 46 4   MCV fL 96 94 93   PLATELETS Thousands/uL 248 276 281   MCH pg 30 8 29 9 30 9   MCHC g/dL 32 0 31 9 33 4   RDW % 12 1 12 2 12 1   MPV fL 10 2 10 1 9 8   NRBC AUTO /100 WBCs  --  0 0       BMP:   Results from last 7 days   Lab Units 06/29/22  0459 06/28/22  0850 06/23/22  1703   POTASSIUM mmol/L 4 3 4 0 3 7   CHLORIDE mmol/L 105 109* 103   CO2 mmol/L 25 30 24   BUN mg/dL 16 19 16   CREATININE mg/dL 1 13 1 50* 1 25   CALCIUM mg/dL 8 3 8 9 8 8       Magnesium:       Coags:   Results from last 7 days   Lab Units 06/28/22  0850   INR  0 45         Complications: none    Condition at Discharge: good     Discharge instructions/Information to patient and family:   See after visit summary for information provided to patient and family  Provisions for Follow-Up Care:  See after visit summary for information related to follow-up care and any pertinent home health orders        Disposition: Home    Planned Readmission: No    Discharge Statement   I spent 45 minutes minutes discharging the patient  This time was spent on the day of discharge  I had direct contact with the patient on the day of discharge  Additional documentation is required if more than 30 minutes were spent on discharge   Evaluating the incision, discussing discharge instructions and restrictions, arranging follow up appointments, discussing medications

## 2022-06-29 NOTE — DISCHARGE INSTRUCTIONS
NEW MEDICATIONS:  Please start taking Protonix 40 mg once daily for 30 days in the post-ablation setting  Please discontinue digoxin and diltiazem  Please reduce dose of Toprol XL to 100 mg twice daily  RESTRICTIONS:  No lifting more than 10 lbs and no strenuous activity for one week  No soaking in a bath tub, hot tub, swimming pool, or any water for at least one week or until groins heal  You may shower  Please let soap and water run over the groins  No scrubbing  Pat the area dry  You may place band-aids on groins daily for up to five days, but you may remove sooner if no issues are noted  If you notice ongoing bleeding, swelling, or large firm lumps in groin near ablation incision, please contact the office of  Dr Paulo Valdes at [phone]

## 2022-07-21 ENCOUNTER — TELEPHONE (OUTPATIENT)
Dept: CARDIOLOGY CLINIC | Facility: CLINIC | Age: 57
End: 2022-07-21

## 2022-07-21 NOTE — TELEPHONE ENCOUNTER
Can you have the patient set up to see Tammy Venegas late August or early September  He sees EP soon in [facility] and me 11/2022 but he should be seen in between and should have ECG day of office visit  Thank you

## 2022-07-26 ENCOUNTER — OFFICE VISIT (OUTPATIENT)
Dept: CARDIOLOGY CLINIC | Facility: CLINIC | Age: 57
End: 2022-07-26
Payer: COMMERCIAL

## 2022-07-26 VITALS
HEART RATE: 56 BPM | BODY MASS INDEX: 28.36 KG/M2 | DIASTOLIC BLOOD PRESSURE: 88 MMHG | SYSTOLIC BLOOD PRESSURE: 128 MMHG | HEIGHT: 73 IN | WEIGHT: 214 LBS

## 2022-07-26 DIAGNOSIS — I48.91 ATRIAL FIBRILLATION, UNSPECIFIED TYPE (HCC): ICD-10-CM

## 2022-07-26 PROCEDURE — 99214 OFFICE O/P EST MOD 30 MIN: CPT | Performed by: PHYSICIAN ASSISTANT

## 2022-07-26 PROCEDURE — 93000 ELECTROCARDIOGRAM COMPLETE: CPT | Performed by: PHYSICIAN ASSISTANT

## 2022-07-26 NOTE — PROGRESS NOTES
Electrophysiology Office Note    Jonathon Bartholomew  1965  [id number]  HEART & VASCULAR [facility] [address] [phone]        Assessment/Plan     Primary diagnosis:   1  Persistent atrial fibrillation, symptomatic    * patient presents to [facility] EP office for post afib ablation follow up  Now s/p cryo PVI and PWI by Dr Pam Hernandez  * since returning home from ablation patient feels major improvement in his JC/SOB  He works for the "[employer] in [facility] (near [place]) does road work, waste management, mow's grass etc and is starting to get his stamina back! * ECG today WNL    * on AC w/ eliquis without any cost or bleeding issues    * periop KAYLYN showing EF 45%, mild LA dilation no MV issues    * remains on amiodarone 200mg QDay    * Today we discussed non cardiac modifiable AF risk factors to prevent further substrate formation    1  HTN - controlled      2  T2DM - does not have      3  ALVARO - did have home sleep study with moderate ALVARO  Discussed ALVARO's correlation with atrial fibrillation  He is going to f/u with sleep medicine to discuss CPAP  4  Alcohol intake- consumes <2 drinks a week     5  Obesity - weight is 214lbs, BMI is 28 23    6  Tobacco use - does not use    * will f/u with echo at end of October, will f/u on results and discuss amiodarone cessation  F/u with dr Pam Hernandez in  6 months       Secondary diagnosis:   1  NICMP    * EF 45% as above, negative NM stress test this year  * likely from tachycardia     2   HLD               Rhythm History:   Atrial fibrillation:     Atrial flutter:     SVT:     VT/VF/PVC:     Device history:   Pacemaker:    Defibrillator:    BIV PPM:    BIV ICD:    ILR:      Cardiac Testing:     ECHO: Results for orders placed during the hospital encounter of 08/04/20    Echo complete with contrast if indicated    Narrative  [facility] [address]  [address]    Transthoracic Echocardiogram  2D, M-mode, Doppler, and Color Doppler    Study date:  05-Aug-2020    Patient: Dasia Rocha  MR number: [id number]  Account number: [de-identified]  : 1965  Age: 63 years  Gender: Male  Status: Inpatient  Location: [facility]  Height: 72 in  Weight: 200 lb  BP: 137/ 97 mmHg    Indications: Atrial Fibrillation    Diagnoses: I48 0 - Atrial fibrillation    Sonographer:  MELONY Oro  Primary Physician:  Waylon Novak DO  Referring Physician:  Mirlande España MD  Group:  Lianne Baltazar  Interpreting Physician:  Joseph Alexis DO    SUMMARY    LEFT VENTRICLE:  The ventricle was mildly dilated  Systolic function was moderately reduced  Ejection fraction was estimated in the range of 25 % to 30 %  There was diffuse hypokinesis  Wall thickness was at the upper limits of normal   Left ventricular diastolic function not assessed due to atrial fibrillation  RIGHT VENTRICLE:  The ventricle was dilated  Systolic function was reduced  TAPSE 1 3  LEFT ATRIUM:  The atrium was moderately dilated  RIGHT ATRIUM:  The atrium was moderately dilated  MITRAL VALVE:  There was moderate regurgitation  The regurgitant jet was centrally directed  TRICUSPID VALVE:  There was mild to moderate regurgitation  Estimated peak PA pressure was 35 mmHg  Assuming a right atrial pressure of 15 mmHg  IVC, HEPATIC VEINS:  The inferior vena cava was dilated  Respirophasic changes in dimension were absent  COMPARISONS:  Compared to limited echocardiogram 2018, the EF is signicantly lower  Previous EF 62 5%  HISTORY: PRIOR HISTORY: AFIB, CHF, CMP, Elevated troponin, H/O MOLLY thrombus    PROCEDURE: The study was performed in the [facility]  This was a routine study  The transthoracic approach was used  The study included complete 2D imaging, M-mode, complete spectral Doppler, and color Doppler  The heart  rate was 89 bpm, at the start of the study  Images were obtained from the parasternal, apical, subcostal, and suprasternal notch acoustic windows  Image quality was good  LEFT VENTRICLE: The ventricle was mildly dilated  Systolic function was moderately reduced  Ejection fraction was estimated in the range of 25 % to 30 %  There was diffuse hypokinesis  Wall thickness was at the upper limits of normal  No  evidence of apical thrombus  DOPPLER: Left ventricular diastolic function not assessed due to atrial fibrillation  RIGHT VENTRICLE: The ventricle was dilated  Systolic function was reduced  TAPSE 1 3  LEFT ATRIUM: The atrium was moderately dilated  RIGHT ATRIUM: The atrium was moderately dilated  MITRAL VALVE: The annulus was dilated  There was minimal thickening  There was normal leaflet separation  DOPPLER: The transmitral velocity was within the normal range  There was no evidence for stenosis  There was moderate regurgitation  The regurgitant jet was centrally directed  AORTIC VALVE: The valve was trileaflet  Leaflets exhibited normal thickness and normal cuspal separation  DOPPLER: Transaortic velocity was within the normal range  There was no evidence for stenosis  There was no significant  regurgitation  TRICUSPID VALVE: The valve structure was normal  There was normal leaflet separation  DOPPLER: The transtricuspid velocity was within the normal range  There was no evidence for stenosis  There was mild to moderate regurgitation  Estimated  peak PA pressure was 35 mmHg  Assuming a right atrial pressure of 15 mmHg  PULMONIC VALVE: Leaflets exhibited normal thickness, no calcification, and normal cuspal separation  DOPPLER: The transpulmonic velocity was within the normal range  There was no significant regurgitation  PERICARDIUM: There was no pericardial effusion  The pericardium was normal in appearance  AORTA: The root exhibited normal size  SYSTEMIC VEINS: IVC: The inferior vena cava was dilated   Respirophasic changes in dimension were absent  SYSTEM MEASUREMENT TABLES    2D  %FS: 11 48 %  AV Diam: 3 25 cm  EDV(Teich): 134 19 ml  EF(Teich): 24 69 %  ESV(Teich): 101 06 ml  IVSd: 1 09 cm  LA Diam: 4 74 cm  LAAs A4C: 26 97 cm2  LAESV A-L A4C: 84 97 ml  LAESV MOD A4C: 80 02 ml  LALs A4C: 7 26 cm  LVEDV MOD A4C: 121 94 ml  LVEF MOD A4C: 20 57 %  LVESV MOD A4C: 96 85 ml  LVIDd: 5 28 cm  LVIDs: 4 67 cm  LVLd A4C: 8 07 cm  LVLs A4C: 7 51 cm  LVPWd: 1 14 cm  RAAs A4C: 21 94 cm2  RAESV A-L: 72 45 ml  RAESV MOD: 70 61 ml  RALs: 5 64 cm  RVIDd: 4 01 cm  RWT: 0 43  SV MOD A4C: 25 09 ml  SV(Teich): 33 13 ml    CF  MR Als  Hakan: 0 39 m/s  MR Flow: 19 76 ml/s  MR Rad: 0 28 cm    CW  MR VTI: 126 65 cm  MR Vmax: 4 01 m/s  TR Vmax: 2 29 m/s  TR maxP 95 mmHg    MM  TAPSE: 1 33 cm    PW  E' Lat: 0 14 m/s  MR ERO: 0 05 cm2  MR RV: 6 25 ml    [employer] Accredited Echocardiography Laboratory    Prepared and electronically signed by    Raghavendra Melara DO  Signed 05-Aug-2020 12:18:36        History of Present Illness     HPI/INTERVAL HISTORY: Rafael Hyde is a 61 y o  male with history as above who presents to [facility] EP office today for post ablation f/u        isidro returning home from ablation patient feels major improvement in his JC/SOB  He works for the [employer] in [facility] (near [place]) does road work, waste management, mow's grass etc and is starting to get his stamina back! Review of Systems  ROS as noted above, otherwise 12 point review of systems was performed and is negative         Historical Information   Social History     Socioeconomic History    Marital status: Single     Spouse name: Not on file    Number of children: Not on file    Years of education: Not on file    Highest education level: Not on file   Occupational History    Not on file   Tobacco Use    Smoking status: Former Smoker     Types: Cigarettes    Smokeless tobacco: Never Used   Vaping Use    Vaping Use: Never used   Substance and Sexual Activity    Alcohol use: Not Currently     Comment: none currently    Drug use: Never    Sexual activity: Yes     Comment: Defer   Other Topics Concern    Not on file   Social History Narrative    Not on file     Social Determinants of Health     Financial Resource Strain: Not on file   Food Insecurity: Not on file   Transportation Needs: Not on file   Physical Activity: Not on file   Stress: Not on file   Social Connections: Not on file   Intimate Partner Violence: Not on file   Housing Stability: Not on file     Past Medical History:   Diagnosis Date    Atrial fibrillation ([facility] )     Hyperlipidemia     Hypertension     Overweight     [facility]     Tachycardia induced cardiomyopathy ([facility] )      Past Surgical History:   Procedure Laterality Date    CARDIAC CATHETERIZATION  02/22/2018    CARDIAC ELECTROPHYSIOLOGY PROCEDURE N/A 6/28/2022    Procedure: Cardiac eps/afib ablation post wall;  Surgeon: Renate Ayon MD;  Location: [facility];   Service: Cardiology    CARDIOVERSION  03/2018    CARDIOVERSION  08/06/2020    INGUINAL HERNIA REPAIR      SINUS SURGERY      WISDOM TOOTH EXTRACTION       Social History     Substance and Sexual Activity   Alcohol Use Not Currently    Comment: none currently     Social History     Substance and Sexual Activity   Drug Use Never     Social History     Tobacco Use   Smoking Status Former Smoker    Types: Cigarettes   Smokeless Tobacco Never Used     Family History   Problem Relation Age of Onset    No Known Problems Mother     Colon cancer Father        Meds/Allergies       Current Outpatient Medications:     amiodarone 200 mg tablet, Take 1 tablet by mouth once daily, Disp: 90 tablet, Rfl: 0    apixaban (ELIQUIS) 5 mg, Take 1 tablet (5 mg total) by mouth 2 (two) times a day, Disp: 60 tablet, Rfl: 11    atorvastatin (LIPITOR) 40 mg tablet, Take 1 tablet (40 mg total) by mouth daily with dinner, Disp: 90 tablet, Rfl: 3   magnesium oxide (MAG-OX) 400 mg, Take 1 tablet (400 mg total) by mouth 2 (two) times a day, Disp: 60 tablet, Rfl: 11    metoprolol succinate (TOPROL-XL) 100 mg 24 hr tablet, Take 1 tablet (100 mg total) by mouth 2 (two) times a day, Disp: 180 tablet, Rfl: 3    rOPINIRole (REQUIP) 0 5 mg tablet, Take 1 tablet (0 5 mg total) by mouth daily at bedtime, Disp: 90 tablet, Rfl: 3    No Known Allergies    Objective   Vitals: Blood pressure 128/88, pulse 56, height 6' 1" (1 854 m), weight 97 1 kg (214 lb)  Physical Exam  Constitutional:       Appearance: He is well-developed  HENT:      Head: Normocephalic and atraumatic  Eyes:      Pupils: Pupils are equal, round, and reactive to light  Cardiovascular:      Rate and Rhythm: Normal rate and regular rhythm  Pulmonary:      Effort: Pulmonary effort is normal       Breath sounds: Normal breath sounds  Abdominal:      General: Bowel sounds are normal       Palpations: Abdomen is soft  Musculoskeletal:         General: Normal range of motion  Cervical back: Normal range of motion and neck supple  Skin:     General: Skin is warm and dry  Neurological:      Mental Status: He is alert and oriented to person, place, and time             Labs:  Admission on 06/28/2022, Discharged on 06/29/2022   Component Date Value    Sodium 06/28/2022 141     Potassium 06/28/2022 4 0     Chloride 06/28/2022 109 (A)    CO2 06/28/2022 30     ANION GAP 06/28/2022 2 (A)    BUN 06/28/2022 19     Creatinine 06/28/2022 1 50 (A)    Glucose 06/28/2022 89     Glucose, Fasting 06/28/2022 89     Calcium 06/28/2022 8 9     eGFR 06/28/2022 50     WBC 06/28/2022 8 49     RBC 06/28/2022 5 22     Hemoglobin 06/28/2022 15 6     Hematocrit 06/28/2022 48 9     MCV 06/28/2022 94     MCH 06/28/2022 29 9     MCHC 06/28/2022 31 9     RDW 06/28/2022 12 2     MPV 06/28/2022 10 1     Platelets 39/03/4611 276     nRBC 06/28/2022 0     Neutrophils Relative 06/28/2022 48     Immat GRANS % 06/28/2022 0     Lymphocytes Relative 06/28/2022 35     Monocytes Relative 06/28/2022 9     Eosinophils Relative 06/28/2022 7 (A)    Basophils Relative 06/28/2022 1     Neutrophils Absolute 06/28/2022 4 06     Immature Grans Absolute 06/28/2022 0 02     Lymphocytes Absolute 06/28/2022 2 99     Monocytes Absolute 06/28/2022 0 76     Eosinophils Absolute 06/28/2022 0 58     Basophils Absolute 06/28/2022 0 08     Protime 06/28/2022 13 9     INR 06/28/2022 1 11     LV EF 06/28/2022 45     Ventricular Rate 06/28/2022 83     Atrial Rate 06/28/2022 258     QRSD Interval 06/28/2022 100     QT Interval 06/28/2022 386     QTC Interval 06/28/2022 453     QRS Axis 06/28/2022 32     T Wave Axis 06/28/2022 77     Activated Clotting Time,* 06/28/2022 309 (A)    Specimen Type 06/28/2022 VENOUS     Activated Clotting Time,* 06/28/2022 316 (A)    Specimen Type 06/28/2022 VENOUS     Activated Clotting Time,* 06/28/2022 336 (A)    Specimen Type 06/28/2022 VENOUS     WBC 06/29/2022 14 07 (A)    RBC 06/29/2022 4 58     Hemoglobin 06/29/2022 14 1     Hematocrit 06/29/2022 44 1     MCV 06/29/2022 96     MCH 06/29/2022 30 8     MCHC 06/29/2022 32 0     RDW 06/29/2022 12 1     Platelets 15/36/6478 248     MPV 06/29/2022 10 2     Sodium 06/29/2022 138     Potassium 06/29/2022 4 3     Chloride 06/29/2022 105     CO2 06/29/2022 25     ANION GAP 06/29/2022 8     BUN 06/29/2022 16     Creatinine 06/29/2022 1 13     Glucose 06/29/2022 156 (A)    Calcium 06/29/2022 8 3     eGFR 06/29/2022 71     Ventricular Rate 06/29/2022 68     Atrial Rate 06/29/2022 68     SC Interval 06/29/2022 186     QRSD Interval 06/29/2022 98     QT Interval 06/29/2022 402     QTC Interval 06/29/2022 427     P Axis 06/29/2022 54     QRS Axis 06/29/2022 48     T Wave Axis 06/29/2022 81     Ventricular Rate 06/29/2022 67     Atrial Rate 06/29/2022 67     SC Interval 06/29/2022 196     QRSD Interval 06/29/2022 98     QT Interval 06/29/2022 408     QTC Interval 06/29/2022 431     P Axis 06/29/2022 50     QRS Axis 06/29/2022 37     T Wave Axis 06/29/2022 74    Lab on 06/23/2022   Component Date Value    Sodium 06/23/2022 139     Potassium 06/23/2022 3 7     Chloride 06/23/2022 103     CO2 06/23/2022 24     ANION GAP 06/23/2022 12     BUN 06/23/2022 16     Creatinine 06/23/2022 1 25     Glucose 06/23/2022 96     Calcium 06/23/2022 8 8     AST 06/23/2022 21     ALT 06/23/2022 33     Alkaline Phosphatase 06/23/2022 84     Total Protein 06/23/2022 7 5     Albumin 06/23/2022 4 0     Total Bilirubin 06/23/2022 0 35     eGFR 06/23/2022 63     WBC 06/23/2022 10 03     RBC 06/23/2022 5 01     Hemoglobin 06/23/2022 15 5     Hematocrit 06/23/2022 46 4     MCV 06/23/2022 93     MCH 06/23/2022 30 9     MCHC 06/23/2022 33 4     RDW 06/23/2022 12 1     MPV 06/23/2022 9 8     Platelets 75/68/4178 281     nRBC 06/23/2022 0     Neutrophils Relative 06/23/2022 45     Immat GRANS % 06/23/2022 0     Lymphocytes Relative 06/23/2022 39     Monocytes Relative 06/23/2022 9     Eosinophils Relative 06/23/2022 6     Basophils Relative 06/23/2022 1     Neutrophils Absolute 06/23/2022 4 56     Immature Grans Absolute 06/23/2022 0 02     Lymphocytes Absolute 06/23/2022 3 94     Monocytes Absolute 06/23/2022 0 86     Eosinophils Absolute 06/23/2022 0 58     Basophils Absolute 06/23/2022 0 07     Ventricular Rate 06/28/2022 49     Atrial Rate 06/28/2022 49     OK Interval 06/28/2022 196     QRSD Interval 06/28/2022 96     QT Interval 06/28/2022 471     QTC Interval 06/28/2022 426     P Axis 06/28/2022 58     QRS Axis 06/28/2022 54     T Wave Axis 06/28/2022 63        Imaging: I have personally reviewed pertinent reports

## 2022-10-27 ENCOUNTER — HOSPITAL ENCOUNTER (OUTPATIENT)
Dept: NON INVASIVE DIAGNOSTICS | Facility: HOSPITAL | Age: 57
Discharge: HOME | End: 2022-10-27
Payer: COMMERCIAL

## 2022-10-27 VITALS
DIASTOLIC BLOOD PRESSURE: 88 MMHG | SYSTOLIC BLOOD PRESSURE: 128 MMHG | WEIGHT: 214 LBS | HEIGHT: 73 IN | BODY MASS INDEX: 28.36 KG/M2 | HEART RATE: 65 BPM

## 2022-10-27 DIAGNOSIS — I48.91 ATRIAL FIBRILLATION, UNSPECIFIED TYPE (HCC): ICD-10-CM

## 2022-10-27 PROCEDURE — 93308 TTE F-UP OR LMTD: CPT

## 2022-10-28 LAB
AORTIC ROOT: 3.8 CM
APICAL FOUR CHAMBER EJECTION FRACTION: 64 %
ASCENDING AORTA: 3.3 CM
DOP CALC LVOT AREA: 4.91 CM2
DOP CALC LVOT DIAMETER: 2.5 CM
E WAVE DECELERATION TIME: 258 MS
FRACTIONAL SHORTENING: 30 (ref 28–44)
INTERVENTRICULAR SEPTUM IN DIASTOLE (PARASTERNAL SHORT AXIS VIEW): 1.1 CM
INTERVENTRICULAR SEPTUM: 1.1 CM (ref 0.6–1.1)
LAAS-AP2: 22.4 CM2
LAAS-AP4: 20.9 CM2
LEFT ATRIUM SIZE: 4.1 CM
LEFT INTERNAL DIMENSION IN SYSTOLE: 3 CM (ref 2.1–4)
LEFT VENTRICLE DIASTOLIC VOLUME (MOD BIPLANE): 105 ML
LEFT VENTRICLE SYSTOLIC VOLUME (MOD BIPLANE): 41 ML
LEFT VENTRICULAR INTERNAL DIMENSION IN DIASTOLE: 4.3 CM (ref 3.5–6)
LEFT VENTRICULAR POSTERIOR WALL IN END DIASTOLE: 1.1 CM
LEFT VENTRICULAR STROKE VOLUME: 49 ML
LV EF: 61 %
LVSV (TEICH): 49 ML
MV E'TISSUE VEL-LAT: 10 CM/S
MV E'TISSUE VEL-SEP: 6 CM/S
MV PEAK A VEL: 0.46 M/S
MV PEAK E VEL: 55 CM/S
MV STENOSIS PRESSURE HALF TIME: 75 MS
MV VALVE AREA P 1/2 METHOD: 2.93
RA PRESSURE ESTIMATED: 3 MMHG
RIGHT ATRIUM AREA SYSTOLE A4C: 21.1 CM2
RIGHT VENTRICLE ID DIMENSION: 4.7 CM
RV PSP: 54 MMHG
SL CV LEFT ATRIUM LENGTH A2C: 5.7 CM
SL CV LV EF: 60
SL CV PED ECHO LEFT VENTRICLE DIASTOLIC VOLUME (MOD BIPLANE) 2D: 83 ML
SL CV PED ECHO LEFT VENTRICLE SYSTOLIC VOLUME (MOD BIPLANE) 2D: 34 ML
TR MAX PG: 51 MMHG
TR PEAK VELOCITY: 3.6 M/S
TRICUSPID VALVE PEAK REGURGITATION VELOCITY: 3.57 M/S

## 2022-11-30 ENCOUNTER — OFFICE VISIT (OUTPATIENT)
Dept: CARDIOLOGY CLINIC | Facility: CLINIC | Age: 57
End: 2022-11-30

## 2022-11-30 VITALS — SYSTOLIC BLOOD PRESSURE: 120 MMHG | DIASTOLIC BLOOD PRESSURE: 78 MMHG

## 2022-11-30 DIAGNOSIS — I48.0 PAROXYSMAL ATRIAL FIBRILLATION (HCC): Primary | ICD-10-CM

## 2022-11-30 DIAGNOSIS — E78.2 MIXED HYPERLIPIDEMIA: ICD-10-CM

## 2022-11-30 DIAGNOSIS — G47.33 OSA (OBSTRUCTIVE SLEEP APNEA): ICD-10-CM

## 2022-11-30 DIAGNOSIS — I10 BENIGN ESSENTIAL HTN: ICD-10-CM

## 2022-11-30 DIAGNOSIS — I50.22 CHRONIC SYSTOLIC CONGESTIVE HEART FAILURE (HCC): ICD-10-CM

## 2022-11-30 DIAGNOSIS — I42.9 CARDIOMYOPATHY, UNSPECIFIED TYPE (HCC): ICD-10-CM

## 2022-11-30 NOTE — PATIENT INSTRUCTIONS
Continue current medications  Call me with any change in symptoms  I would recommend follow up with sleep medicine

## 2022-11-30 NOTE — PROGRESS NOTES
Cardiology Office Visit    Lalo Session  [id number]  1965    [facility]  [address]  [facility] [address] [phone]      Dear Shelley Sales MD,    I had the pleasure of seeing your patient at our [address] [facility] [address] office today 11/30/2022  As you know he is a pleasant 61y o  year old male with a medical history as described below  Reason for office visit: Follow up atrial fibrillation, cardiomyopathy, chronic systolic congestive heart failure, hypertension and hyperlipidemia  1  Paroxysmal atrial fibrillation [facility])  Assessment & Plan:  History of atrial fibrillation in 2018 for which he underwent cardioversion 03/2018  Tachycardia induced cardiomyopathy noted at that time with an EF of 20% which subsequently improved after cardioversion to 62%  Patient underwent repeat KAYLYN guided cardioversion 08/06/2020 with successful conversion to normal sinus rhythm  Dontrell Meza He presented back to [address] [place] [address] on 9/24/2021 in A fib with RVR  KAYLYN 9/29/21 showed left atrial appendage thrombus and cardioversion was aborted  Estimated EF during KAYLYN at 25-30%  He remained on Eliquis 5 mg BID uninterrupted and KAYLYN was performed 11/10/2021 and 2/2/2022 with continued evidence of MOLLY thrombus  Cardioversion was aborted x 2  Reviewed last KAYLYN images with Dr Orly Silva who felt artifact was present without thrombus  Successful cardioversion 3/16/2022  ECG today 4/19/2022 shows atrial fibrillation with RVR ( bpm)  Continue metoprolol succinate 100 mg BID to 125 mg BID  Patient has been abstinent from alcohol intake and I applauded his efforts  Sleep study done 5/2022 showed moderate obstructive sleep apnea and significant hypoxia  He asks if it is important to treat ALVARO to which I told him it was very important  Patient did undergo atrial fibrillation ablation using cryotherapy 06/28/2022    Patient was maintained on amiodarone for 3 months postprocedure and was stopped 11/11/2022  Patient feels well without palpitations and 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  2  Cardiomyopathy, unspecified type [facility])  Assessment & Plan:  Patient with prior history of tachycardia induced cardiomyopathy with prior EF of 20% that improved to 62% post cardioversion in 2018  EF 8/5/2020 25-30%  KAYLYN 9/29/2021 with EF 25-30%  KAYLYN 3/16/2022 EF 40%  There could also be some component of ETOH induced cardiomyopathy  Continue cardiomyopathy specific metoprolol succinate 100 mg BID  Echocardiogram 10/27/2022 showed an EF of 60%  Patient has noted significant improvement in his symptoms with restoration/maintenance of normal sinus rhythm  3  Chronic systolic congestive heart failure (HCC)  Assessment & Plan:    Wt Readings from Last 3 Encounters:   11/30/22 (P) 97 1 kg (214 lb)   10/27/22 97 1 kg (214 lb)   07/26/22 97 1 kg (214 lb)     No evidence of volume overload during recent hospitalization or at follow up today  Not on diuretic therapy at present  Reviewed 2g sodium diet, 2L fluid restriction and need fordaily weights  He will report 3 pound weight gain in 1 day/5 pound weight gain in 1 week, shortness of breath, leg swelling, or any other concerns  Will continue to monitor  4  Benign essential HTN  Assessment & Plan:  Blood pressure is well controlled on my personal recheck at 120/78  Continue metoprolol succinate 100 mg BID       5  Mixed hyperlipidemia  Assessment & Plan:  Lipid panel 8/4/2020:     HDL 40  LDL 80  Currently on atorvastatin 40mg daily  Plan for updated lipid panel at follow up  6  ALVARO (obstructive sleep apnea)  Assessment & Plan:  New diagnosis of moderate sleep apnea by home sleep study 5/4/2022  We reviewed importance of sleep apnea management in the setting of atrial fibrillation    Referral placed again to sleep medicine as he tells me he is agreeable to at least discuss options for treatment  Orders:  -     Ambulatory Referral to Sleep Medicine; Future           HPI   Patient presents for follow-up of atrial fibrillation as well as chronic systolic heart failure and presumed tachycardia induced cardiomyopathy  Patient presented 08/04/2020 after he presented to his primary provider's office after sustaining a fall approximately a week prior  Patient was noted to have a rapid irregular heartbeat on exam and was sent to the emergency room for further evaluation  Upon arrival to the emergency room patient was noted to be in rapid atrial fibrillation  Patient has a known history of atrial fibrillation and was previously on Eliquis anticoagulation as well as amiodarone, metoprolol and statin therapy for few months but then stop them due to loss of insurance  Troponin was initially noted to be 0 15  NT proBNP 2,786  The patient had atrial fibrillation in 2018 at which time he underwent cardioversion 03/2018 and was started on amiodarone  The patient was thought to have a tachycardia induced cardiomyopathy with an ejection fraction of 20%  He did undergo cardiac catheterization 02/22/2018 which showed normal coronary arteries  Repeat echocardiogram 08/2018 showed ejection fraction of 62%  Patient underwent KAYLYN guided cardioversion 08/06/2020 with successful conversion to normal sinus rhythm  Patient was started on metoprolol succinate 50 mg twice daily  He was re-loaded with amiodarone and discharged home on 200 mg daily  We discussed the need for alcohol cessation  We discussed there would be a low threshold to proceed with ablation given his recurrent heart failure in the setting of atrial fibrillation  We also discussed the need for an eventual sleep study  Unfortunately the patient does not have any insurance at this time    Echocardiogram 08/05/2020 showed ejection fraction of 25-30% but difficult to assess with underlying atrial fibrillation with rapid ventricular rates  Patient was noted to have elevated troponin with a flat trend during his hospitalization  Peak of 0 15  TSH was low  * Patient was admitted to [address] [place] [address] 9/24-9/30/2021 for rapid atrial fibrillation  KAYLYN guided cardioversion was planned but had to be aborted due to possible MOLLY thrombus  Medications were adjusted as an outpatient to try to obtain better heart rate control  * KAYLYN planned cardioversion 11/10/2021 concerning for possible thrombus in MOLLY and cardioversion was aborted  * Patient was set up for outpatient KAYLYN/DCCV 2/2/2022 but concern for thrombus again noted  * Images reviewed with Dr Meghna Henry who felt the presumed clot was artifact  * Patient underwent KAYLYN/CV 3/16/2022 with conversion to normal sinus rhythm  4/19/2022: Patient presents to the office today for follow up  He tells me that he has noted increased dyspnea on exertion since getting back on the [employer] truck at work  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  No overt chest pain  Felt well after cardioversion but now feels just ok  ECG today shows atrial fibrillation with RVR at 106 bpm  Patient has been compliant with his medications  * Patient was seen by [place] 5/20/2022  * Patient seen in consultation with Dr Gayle Webster 5/25/2022 and underwent atrial fibrillation ablation using cryoablation 06/28/2022  Plan was for 3 months of amiodarone post ablation  Amiodarone stopped 11/1/2022  * Echocardiogram 10/27/2022 showed EF 60%  11/30/2022: Patient presents to the office today for follow up  He is feeling well  Echocardiogram 10/27/2022 showed normal LVEF 60%  Breathing is improved  He is back doing the [employer] truck 2 days a week  No edema  No chest pain  No palpitations  No lightheadedness or dizziness  Two weeks ago he had some wheezing which resolved  No other symptoms   We discussed his repeat echocardiogram        Patient Active Problem List   Diagnosis   • Paroxysmal atrial fibrillation (HCC)   • Elevated troponin   • Chronic systolic congestive heart failure (HCC)   • Cardiomyopathy (HCC)   • Abnormal TSH   • Stage 3b chronic kidney disease (HCC)   • Benign essential HTN   • Mixed hyperlipidemia   • ALVARO (obstructive sleep apnea)     Past Medical History:   Diagnosis Date   • Atrial fibrillation (HCC)    • Hyperlipidemia    • Hypertension    • Overweight    • Systolic heart failure (HCC)    • Tachycardia induced cardiomyopathy (HCC)      Social History     Socioeconomic History   • Marital status: Single     Spouse name: Not on file   • Number of children: Not on file   • Years of education: Not on file   • Highest education level: Not on file   Occupational History   • Not on file   Tobacco Use   • Smoking status: Former     Types: Cigarettes   • Smokeless tobacco: Never   Vaping Use   • Vaping Use: Never used   Substance and Sexual Activity   • Alcohol use: Not Currently     Comment: none currently   • Drug use: Never   • Sexual activity: Yes     Comment: Defer   Other Topics Concern   • Not on file   Social History Narrative   • Not on file     Social Determinants of Health     Financial Resource Strain: Not on file   Food Insecurity: Not on file   Transportation Needs: Not on file   Physical Activity: Not on file   Stress: Not on file   Social Connections: Not on file   Intimate Partner Violence: Not on file   Housing Stability: Not on file      Family History   Problem Relation Age of Onset   • No Known Problems Mother    • Colon cancer Father      Past Surgical History:   Procedure Laterality Date   • CARDIAC CATHETERIZATION  02/22/2018   • CARDIAC ELECTROPHYSIOLOGY PROCEDURE N/A 6/28/2022    Procedure: Cardiac eps/afib ablation post wall;  Surgeon: Fidencio Zuñiga MD;  Location: [facility];   Service: Cardiology   • CARDIOVERSION  03/2018   • CARDIOVERSION  08/06/2020   • INGUINAL HERNIA REPAIR     • SINUS SURGERY     • WISDOM TOOTH EXTRACTION         Current Outpatient Medications:   •  apixaban (ELIQUIS) 5 mg, Take 1 tablet (5 mg total) by mouth 2 (two) times a day, Disp: 60 tablet, Rfl: 11  •  atorvastatin (LIPITOR) 40 mg tablet, TAKE 1 TABLET (40 MG TOTAL) BY MOUTH ONCE DAILY WITH  DINNER, Disp: 30 tablet, Rfl: 11  •  magnesium oxide (MAG-OX) 400 mg, Take 1 tablet (400 mg total) by mouth 2 (two) times a day, Disp: 60 tablet, Rfl: 11  •  metoprolol succinate (TOPROL-XL) 100 mg 24 hr tablet, TAKE 1 TABLET (100 MG TOTAL) BY MOUTH TWICE DAILY, Disp: 60 tablet, Rfl: 11  •  rOPINIRole (REQUIP) 0 5 mg tablet, TAKE 1 TABLET (0 5 MG TOTAL) BY MOUTH ONCE DAILY AT BEDTIME, Disp: 30 tablet, Rfl: 0  No Known Allergies      Cardiac Test Results:     Echocardiogram   •  Left Ventricle: Left ventricular cavity size is normal  Wall thickness is upper normal  The left ventricular ejection fraction is 60%  Systolic function is normal  Wall motion is normal  Diastolic function is mildly abnormal, consistent with grade I (abnormal) relaxation  Left atrial filling pressure is normal   •  Right Ventricle: Right ventricular cavity size is dilated  •  Left Atrium: The atrium is dilated  •  Right Atrium: The atrium is dilated  •  Mitral Valve: There is trace regurgitation  •  Tricuspid Valve: There is mild regurgitation  The right ventricular systolic pressure is mildly elevated  The estimated right ventricular systolic pressure is 26 68 mmHg  •  Pulmonic Valve: There is trace regurgitation  •  Aorta: The aortic root is mildly dilated  The ascending aorta is normal in size  The aortic root is 3 80 cm  The ascending aorta is 3 3 cm  •  Prior KAYLYN study available for comparison  Prior study date: 6/28/2022  Changes noted when compared to prior study  Changes include: EF on prior study was 45% (KAYLYN)  PASP elevated on current study at 54 mmHg which was noted to be 35 mmHg on TTE dated 8/5/2020  KAYLYN 3/16/2022:   EF 40%  Moderate global hypokinesis  Mildly reduced RV function     Mildly dilated left and right atrium  No MOLLY thrombus  Successful cardioversion at 200J to normal sinus rhythm  Echocardiogram 8/5/2020:  Left ventricle is mildly dilated  EF 25-30%  Diffuse hypokinesis  Dilated right ventricle with reduced right ventricular systolic function  Moderately dilated left atrium  Moderately dilated right atrium  Moderate mitral regurgitation  Mild-to-moderate tricuspid regurgitation  Estimated PASP 35 mmHg  Review of Systems:    Review of Systems   Constitutional: Positive for fatigue  Negative for activity change and appetite change  HENT: Negative for congestion, hearing loss, tinnitus and trouble swallowing  Eyes: Negative for visual disturbance  Respiratory: Negative for cough, chest tightness, shortness of breath and wheezing  Dyspnea on exertion   Cardiovascular: Negative for chest pain, palpitations and leg swelling  Gastrointestinal: Negative for abdominal distention, abdominal pain, nausea and vomiting  Genitourinary: Negative for difficulty urinating  Musculoskeletal: Negative for arthralgias  Skin: Negative for rash  Neurological: Negative for dizziness, syncope and light-headedness  Hematological: Does not bruise/bleed easily  Psychiatric/Behavioral: Negative for confusion  The patient is not nervous/anxious  All other systems reviewed and are negative  Vitals:    11/30/22 1425 11/30/22 1446   BP: (P) 148/90 120/78   Weight: (P) 97 1 kg (214 lb)    Height: (P) 6' 1" (1 854 m)      Vitals:    11/30/22 1425   Weight: (P) 97 1 kg (214 lb)     Height: (P) 6' 1" (185 4 cm)     Physical Exam  Vitals reviewed  Constitutional:       Appearance: He is well-developed  HENT:      Head: Normocephalic and atraumatic  Eyes:      Conjunctiva/sclera: Conjunctivae normal       Pupils: Pupils are equal, round, and reactive to light  Neck:      Vascular: No JVD  Cardiovascular:      Rate and Rhythm: Tachycardia present  Rhythm irregular  Heart sounds: Normal heart sounds  No murmur heard  No friction rub  No gallop  Pulmonary:      Effort: Pulmonary effort is normal       Breath sounds: Normal breath sounds  Abdominal:      General: Bowel sounds are normal       Palpations: Abdomen is soft  Musculoskeletal:      Cervical back: Normal range of motion  Skin:     General: Skin is warm and dry  Neurological:      Mental Status: He is alert and oriented to person, place, and time     Psychiatric:         Behavior: Behavior normal

## 2022-12-01 NOTE — ASSESSMENT & PLAN NOTE
Blood pressure is well controlled on my personal recheck at 120/78  Continue metoprolol succinate 100 mg BID

## 2022-12-01 NOTE — ASSESSMENT & PLAN NOTE
History of atrial fibrillation in 2018 for which he underwent cardioversion 03/2018  Tachycardia induced cardiomyopathy noted at that time with an EF of 20% which subsequently improved after cardioversion to 62%  Patient underwent repeat KAYLYN guided cardioversion 08/06/2020 with successful conversion to normal sinus rhythm  Giovany Larose He presented back to [address] [place] [address] on 9/24/2021 in A fib with RVR  KAYLYN 9/29/21 showed left atrial appendage thrombus and cardioversion was aborted  Estimated EF during KAYLYN at 25-30%  He remained on Eliquis 5 mg BID uninterrupted and KAYLYN was performed 11/10/2021 and 2/2/2022 with continued evidence of MOLLY thrombus  Cardioversion was aborted x 2  Reviewed last KAYLYN images with Dr Sarah Contreras who felt artifact was present without thrombus  Successful cardioversion 3/16/2022  ECG today 4/19/2022 shows atrial fibrillation with RVR ( bpm)  Continue metoprolol succinate 100 mg BID to 125 mg BID  Patient has been abstinent from alcohol intake and I applauded his efforts  Sleep study done 5/2022 showed moderate obstructive sleep apnea and significant hypoxia  He asks if it is important to treat ALVARO to which I told him it was very important  Patient did undergo atrial fibrillation ablation using cryotherapy 06/28/2022  Patient was maintained on amiodarone for 3 months postprocedure and was stopped 11/11/2022  Patient feels well without palpitations and 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

## 2022-12-01 NOTE — ASSESSMENT & PLAN NOTE
Wt Readings from Last 3 Encounters:   11/30/22 (P) 97 1 kg (214 lb)   10/27/22 97 1 kg (214 lb)   07/26/22 97 1 kg (214 lb)     No evidence of volume overload during recent hospitalization or at follow up today  Not on diuretic therapy at present  Reviewed 2g sodium diet, 2L fluid restriction and need fordaily weights  He will report 3 pound weight gain in 1 day/5 pound weight gain in 1 week, shortness of breath, leg swelling, or any other concerns  Will continue to monitor

## 2022-12-01 NOTE — ASSESSMENT & PLAN NOTE
New diagnosis of moderate sleep apnea by home sleep study 5/4/2022  We reviewed importance of sleep apnea management in the setting of atrial fibrillation  Referral placed again to sleep medicine as he tells me he is agreeable to at least discuss options for treatment

## 2022-12-01 NOTE — ASSESSMENT & PLAN NOTE
Patient with prior history of tachycardia induced cardiomyopathy with prior EF of 20% that improved to 62% post cardioversion in 2018  EF 8/5/2020 25-30%  KAYLYN 9/29/2021 with EF 25-30%  KAYLYN 3/16/2022 EF 40%  There could also be some component of ETOH induced cardiomyopathy  Continue cardiomyopathy specific metoprolol succinate 100 mg BID  Echocardiogram 10/27/2022 showed an EF of 60%  Patient has noted significant improvement in his symptoms with restoration/maintenance of normal sinus rhythm

## 2022-12-29 DIAGNOSIS — G25.81 RESTLESS LEG: ICD-10-CM

## 2022-12-30 RX ORDER — ROPINIROLE 0.5 MG/1
TABLET, FILM COATED ORAL
Qty: 30 TABLET | Refills: 0 | OUTPATIENT
Start: 2022-12-30

## 2023-01-27 DIAGNOSIS — I51.3 LEFT ATRIAL THROMBUS: ICD-10-CM

## 2023-01-27 DIAGNOSIS — G25.81 RESTLESS LEG: ICD-10-CM

## 2023-01-27 DIAGNOSIS — I48.0 PAROXYSMAL ATRIAL FIBRILLATION (HCC): ICD-10-CM

## 2023-01-30 DIAGNOSIS — I51.3 LEFT ATRIAL THROMBUS: ICD-10-CM

## 2023-01-30 DIAGNOSIS — G25.81 RESTLESS LEG: ICD-10-CM

## 2023-01-30 DIAGNOSIS — I48.0 PAROXYSMAL ATRIAL FIBRILLATION (HCC): ICD-10-CM

## 2023-01-30 RX ORDER — APIXABAN 5 MG/1
TABLET, FILM COATED ORAL
Qty: 60 TABLET | Refills: 11 | Status: SHIPPED | OUTPATIENT
Start: 2023-01-30

## 2023-01-30 RX ORDER — ROPINIROLE 0.5 MG/1
TABLET, FILM COATED ORAL
Qty: 30 TABLET | Refills: 0 | OUTPATIENT
Start: 2023-01-30

## 2023-01-30 RX ORDER — ROPINIROLE 0.5 MG/1
0.5 TABLET, FILM COATED ORAL
Qty: 90 TABLET | Refills: 3 | OUTPATIENT
Start: 2023-01-30

## 2023-02-28 ENCOUNTER — OFFICE VISIT (OUTPATIENT)
Dept: CARDIOLOGY CLINIC | Facility: CLINIC | Age: 58
End: 2023-02-28

## 2023-02-28 VITALS
HEIGHT: 73 IN | HEART RATE: 78 BPM | OXYGEN SATURATION: 97 % | BODY MASS INDEX: 28.23 KG/M2 | DIASTOLIC BLOOD PRESSURE: 88 MMHG | WEIGHT: 213 LBS | SYSTOLIC BLOOD PRESSURE: 130 MMHG

## 2023-02-28 DIAGNOSIS — N18.32 STAGE 3B CHRONIC KIDNEY DISEASE (HCC): ICD-10-CM

## 2023-02-28 DIAGNOSIS — R06.2 WHEEZING: ICD-10-CM

## 2023-02-28 DIAGNOSIS — I42.9 CARDIOMYOPATHY, UNSPECIFIED TYPE (HCC): ICD-10-CM

## 2023-02-28 DIAGNOSIS — G47.33 OSA (OBSTRUCTIVE SLEEP APNEA): ICD-10-CM

## 2023-02-28 DIAGNOSIS — E78.2 MIXED HYPERLIPIDEMIA: ICD-10-CM

## 2023-02-28 DIAGNOSIS — I10 BENIGN ESSENTIAL HTN: ICD-10-CM

## 2023-02-28 DIAGNOSIS — I48.0 PAROXYSMAL ATRIAL FIBRILLATION (HCC): Primary | ICD-10-CM

## 2023-02-28 DIAGNOSIS — I50.22 CHRONIC SYSTOLIC CONGESTIVE HEART FAILURE (HCC): ICD-10-CM

## 2023-02-28 RX ORDER — LANOLIN ALCOHOL/MO/W.PET/CERES
400 CREAM (GRAM) TOPICAL 2 TIMES DAILY
Qty: 60 TABLET | Refills: 11 | Status: SHIPPED | OUTPATIENT
Start: 2023-02-28 | End: 2024-02-23

## 2023-02-28 RX ORDER — ALBUTEROL SULFATE 90 UG/1
2 AEROSOL, METERED RESPIRATORY (INHALATION) EVERY 4 HOURS PRN
Qty: 6.7 G | Refills: 0 | Status: SHIPPED | OUTPATIENT
Start: 2023-02-28

## 2023-02-28 NOTE — PROGRESS NOTES
Cardiology Follow Up    Tu Morrison  1965  [id number]  [facility] CARDIOVASC PHYS  [address]  [place] [id number] [phone]    Melly Mcnulty presents for follow up of atrial fibrillation, cardiomyopathy, chronic systolic congestive heart failure, hypertension and hyperlipidemia      1  Paroxysmal atrial fibrillation [facility])  Assessment & Plan:  History of atrial fibrillation in 2018 for which he underwent cardioversion 03/2018  Tachycardia induced cardiomyopathy noted at that time with an EF of 20% which subsequently improved after cardioversion to 62%  Patient underwent repeat KAYLYN guided cardioversion 08/06/2020 with successful conversion to normal sinus rhythm  He presented back to [facility] ER on 9/24/2021 in A fib with RVR  KAYLYN 9/29/21 showed left atrial appendage thrombus and cardioversion was aborted  Estimated EF during KAYLYN at 25-30%  He remained on Eliquis 5 mg BID uninterrupted and KAYLYN was performed 11/10/2021 and 2/2/2022 with continued evidence of MOLLY thrombus  Cardioversion was aborted x 2  Reviewed last KAYLYN images with Dr Paresh Sargent who felt artifact was present without thrombus  Successful cardioversion 3/16/2022  ECG 4/19/2022 shows atrial fibrillation with RVR ( bpm)  Sleep study done 5/2022 showed moderate obstructive sleep apnea and significant hypoxia, referred to sleep medicine with pending evaluation  Patient did undergo atrial fibrillation ablation using cryotherapy 06/28/2022  Patient was maintained on amiodarone for 3 months postprocedure and was stopped 11/11/2022  Patient feels well without palpitations and 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  Orders:  -     magnesium oxide 400 mg TABS; Take 1 tablet (400 mg total) by mouth 2 (two) times a day    2   Cardiomyopathy, unspecified type [facility])  Assessment & Plan:  Patient with prior history of tachycardia induced cardiomyopathy with prior EF of 20% that improved to 62% post cardioversion in 2018  EF 8/5/2020 25-30%  KAYLYN 9/29/2021 with EF 25-30%  KAYLYN 3/16/2022 EF 40%  There could also be some component of ETOH induced cardiomyopathy  Continue cardiomyopathy specific metoprolol succinate 100 mg BID  Echocardiogram 10/27/2022 showed an EF of 60%  Patient has noted significant improvement in his symptoms with restoration/maintenance of normal sinus rhythm  3  Chronic systolic congestive heart failure [facility])  Assessment & Plan:    Wt Readings from Last 3 Encounters:   02/28/23 96 6 kg (213 lb)   11/30/22 (P) 97 1 kg (214 lb)   10/27/22 97 1 kg (214 lb)     Appears euvolemic  Not on diuretic therapy at present  Reviewed 2g sodium diet, 2L fluid restriction and need for daily weights  He will report 3 pound weight gain in 1 day/5 pound weight gain in 1 week, shortness of breath, leg swelling, or any other concerns  Will continue to monitor  4  Wheezing  Assessment & Plan:  Noted on exam today  History of wheezing associated with Covid infection  Given albuterol HFA and PFT's ordered  Plan for pulmonary evaluation  Orders:  -     albuterol (Proventil HFA) 90 mcg/act inhaler; Inhale 2 puffs every 4 (four) hours as needed for wheezing  -     Complete PFT with post bronchodilator; Future    5  Benign essential HTN  Assessment & Plan:  Blood pressure is at goal   Continue metoprolol succinate 100 mg BID  Updated lab work ordered  Orders:  -     Basic metabolic panel; Future    6  Mixed hyperlipidemia  Assessment & Plan:  Lipid panel 8/4/2020:     HDL 40  LDL 80  Currently on atorvastatin 40mg daily  Given order for updated lipid panel  Orders:  -     Lipid Panel with Direct LDL reflex; Future    7  ALVARO (obstructive sleep apnea)  Assessment & Plan:  New diagnosis of moderate sleep apnea by home sleep study 5/4/2022  We reviewed importance of sleep apnea management in the setting of atrial fibrillation    He has been given the contact info for sleep medicine and again encouraged to schedule  8  Stage 3b chronic kidney disease [facility])  Assessment & Plan:  Lab Results   Component Value Date    EGFR 71 06/29/2022    EGFR 50 06/28/2022    EGFR 63 06/23/2022    CREATININE 1 13 06/29/2022    CREATININE 1 50 (H) 06/28/2022    CREATININE 1 25 06/23/2022     Updated lab work ordered  Avoiding nephrotoxic agents  THOMAS Gonzalez has a past medical history of paroxysmal atrial fibrillation, cardiomyopathy (possibly tachycardia induced), CHF, HTN, HLD      A fib history dates back to 2018 at which time his EF was noted to be 20%  He was referred to [facility] for cardiac catheterization on 2/22/2018 which showed normal coronary arteries  He underwent cardioversion on 3/2018 and was started on amiodarone  Repeat echo 8/2018 showed improved EF to 62%  He lost his insurance and was lost to follow up      He presented to his PCP office in 2020 for evaluation after a fall and was found to be in A fib with RVR and directed to the [address] [place] [address] ER where he was admitted  Echo during that admission revealed EF 25-30%  He underwent successful KAYLYN guided cardioversion on 8/6/2020 and was started on amiodarone loading, Eliquis, and metoprolol succinate  He was then lost to follow up again      Admitted to [address] [place] [address] 9/24-9/30/2021 for a fib with RVR when he presented with fatigue and shortness of breath x 2 weeks that felt similar to prior a fib episodes  He was without medical insurance and had run out of his medications  EF of 25-30% by KAYLYN with possible MOLLY thrombus  No cardioversion  Diltiazem was used for rate control following discussion with EP as suspected tachy-mediated cardiomyopathy  Metoprolol succinate resumed and titrated  Digoxin added  Eliquis samples provided      He underwent repeat KAYLYN on 11/10/2021, however MOLLY thrombus was present and cardioversion was aborted   He had been faithful with Eliquis dosing      KAYLYN/DCCV 2/2/2022 again showed concern for thrombus, but images were reviewed with Dr Domingo Santos who felt the presumed clot was artifact      Avtar underwent KAYLYN/CV 3/16/2022 with conversion to normal sinus rhythm       He followed up most recently with Dr Artem Francis on 4/19/2022  ECG revealed a fib with RVR, rate 106 bpm  He had been compliant with his medications  He noted increased dyspnea on exertion since going 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  He denied chest pain  He reported that he felt well after the cardioversion, but at the time of his visit was feeling "ok"  Metoprolol succinate was increased to 125 mg BID  He was referred to EP, Dr Esequiel Roberto, for further a fib management  He met with Dr Esequiel Roberto on 5/25/2022 and underwent atrial fibrillation cryoablation with left atrial posterior wall isolation on 6/28/2022  Amiodarone was continued for 3 months and then stopped  Echocardiogram 10/27/2022 showed recovered LVEF of 60%  He followed up most recently with Dr Artem Francis on 11/30/2022 at which time he was feeling well  He was referred to sleep medicine to follow up on his abnormal sleep study  2/28/2023: Dm Curtis presents for routine follow up  He continues to do well from a cardiac standpoint  No chest pain, palpitations, lightheadedness, or edema  Weight remains stable  No unusual bleeding or bruising  Reports a dry cough with wheezing for about 1 week  He reports having similar symptoms 1 year ago which improved with albuterol  He does not carry a pulmonary diagnosis  Does not smoke  Denies URI symptoms  No fluid retention  He is taking his medications as prescribed  He has not returned calls from the sleep center to schedule an appointment with sleep medicine      Medical Problems     Problem List     Elevated troponin    Abnormal TSH    Paroxysmal atrial fibrillation (HCC)    Cardiomyopathy (HCC)    Chronic systolic congestive heart failure (HCC)    Benign essential HTN    Mixed hyperlipidemia    ALVARO (obstructive sleep apnea)    Stage 3b chronic kidney disease ([facility] [address] ) Past Medical History:   Diagnosis Date   • Atrial fibrillation ([facility] [address] )    • Hyperlipidemia    • Hypertension    • Overweight    • Systolic heart failure (HCC)    • Tachycardia induced cardiomyopathy (HCC)      Social History     Socioeconomic History   • Marital status: Single     Spouse name: Not on file   • Number of children: Not on file   • Years of education: Not on file   • Highest education level: Not on file   Occupational History   • Not on file   Tobacco Use   • Smoking status: Former     Types: Cigarettes   • Smokeless tobacco: Never   Vaping Use   • Vaping Use: Never used   Substance and Sexual Activity   • Alcohol use: Not Currently     Comment: none currently   • Drug use: Never   • Sexual activity: Yes     Comment: Defer   Other Topics Concern   • Not on file   Social History Narrative   • Not on file     Social Determinants of Health     Financial Resource Strain: Not on file   Food Insecurity: Not on file   Transportation Needs: Not on file   Physical Activity: Not on file   Stress: Not on file   Social Connections: Not on file   Intimate Partner Violence: Not on file   Housing Stability: Not on file      Family History   Problem Relation Age of Onset   • No Known Problems Mother    • Colon cancer Father      Past Surgical History:   Procedure Laterality Date   • CARDIAC CATHETERIZATION  02/22/2018   • CARDIAC ELECTROPHYSIOLOGY PROCEDURE N/A 6/28/2022    Procedure: Cardiac eps/afib ablation post wall;  Surgeon: Marely Arrington MD;  Location: [facility] CARD[facility]AC [facility];   Service: Cardiology   • CARDIOVERSION  03/2018   • CARDIOVERSION  08/06/2020   • INGUINAL HERNIA REPAIR     • SINUS SURGERY     • WISDOM TOOTH EXTRACTION         Current Outpatient Medications:   •  albuterol (Proventil HFA) 90 mcg/act inhaler, Inhale 2 puffs every 4 (four) hours as needed for wheezing, Disp: 6 7 g, Rfl: 0  •  atorvastatin (LIPITOR) 40 mg tablet, TAKE 1 TABLET (40 MG TOTAL) BY MOUTH ONCE DAILY WITH  DINNER, Disp: 30 tablet, Rfl: 11  •  Eliquis 5 MG, Take 1 tablet by mouth twice daily, Disp: 60 tablet, Rfl: 11  •  magnesium oxide 400 mg TABS, Take 1 tablet (400 mg total) by mouth 2 (two) times a day, Disp: 60 tablet, Rfl: 11  •  metoprolol succinate (TOPROL-XL) 100 mg 24 hr tablet, TAKE 1 TABLET (100 MG TOTAL) BY MOUTH TWICE DAILY, Disp: 60 tablet, Rfl: 11  No Known Allergies    Labs:     Chemistry        Component Value Date/Time    K 4 3 06/29/2022 0459     06/29/2022 0459    CO2 25 06/29/2022 0459    BUN 16 06/29/2022 0459    CREATININE 1 13 06/29/2022 0459        Component Value Date/Time    CALCIUM 8 3 06/29/2022 0459    ALKPHOS 84 06/23/2022 1703    AST 21 06/23/2022 1703    ALT 33 06/23/2022 1703        Cardiac Test Results:   Echocardiogram 10/27/2022:  EF 60%  Grade 1 DD  Biatrial dilation  RV dilation  Trace MR  Mild TR  PASP 54mmHg  Trace NE  Aoric root 3 8 cm, ascending aorta 3 3 cm  Nuclear stress test 5/24/2022:  Perfusion: Normal post stress perfusion  No definitive signs of ischemia on nuclear images  •  Stress Function: Calculated LVEF 46%  Pt with AFIB during study with numerous beats elimated from image aquisition  Given irregular cardiac intervals with AFIB, suggest ECHO for better LVEF evaluation  •  Deconditioned HR responce to exercise  Poor exercise tolerence      KAYLYN 3/16/2022:   EF 40%  Moderate global hypokinesis  Mildly reduced RV function  Mildly dilated left and right atrium  No MOLLY thrombus  Successful cardioversion at 200J to normal sinus rhythm       Echocardiogram 8/5/2020:  Left ventricle is mildly dilated  EF 25-30%  Diffuse hypokinesis  Dilated right ventricle with reduced right ventricular systolic function  Moderately dilated left atrium  Moderately dilated right atrium  Moderate mitral regurgitation  Mild-to-moderate tricuspid regurgitation  Estimated PASP 35 mmHg  Review of Systems   Constitutional: Negative  HENT: Negative      Cardiovascular: Negative for chest pain, dyspnea on exertion, irregular heartbeat, leg swelling, near-syncope, orthopnea and palpitations  Respiratory: Positive for cough and wheezing  Negative for snoring  Endocrine: Negative  Skin: Negative  Musculoskeletal: Negative  Gastrointestinal: Negative  Genitourinary: Negative  Neurological: Negative  Psychiatric/Behavioral: Negative  Vitals:    02/28/23 1505   BP: 130/88   Pulse:    SpO2:      Vitals:    02/28/23 1442   Weight: 96 6 kg (213 lb)     Height: 6' 1" (185 4 cm)   Body mass index is 28 1 kg/m²  Physical Exam  Vitals and nursing note reviewed  Constitutional:       General: He is not in acute distress  Appearance: He is well-developed  He is not diaphoretic  HENT:      Head: Normocephalic and atraumatic  Neck:      Vascular: No carotid bruit or JVD  Cardiovascular:      Rate and Rhythm: Normal rate and regular rhythm  No extrasystoles are present  Pulses: Intact distal pulses  Heart sounds: Normal heart sounds, S1 normal and S2 normal  No murmur heard  No friction rub  No gallop  Comments: No edema  Pulmonary:      Effort: Pulmonary effort is normal  No respiratory distress  Breath sounds: Examination of the right-lower field reveals wheezing  Wheezing present  Abdominal:      General: There is no distension  Palpations: Abdomen is soft  Tenderness: There is no abdominal tenderness  Skin:     General: Skin is warm and dry  Findings: No rash  Neurological:      Mental Status: He is alert and oriented to person, place, and time  Psychiatric:         Mood and Affect: Mood and affect normal          Speech: Speech normal          Behavior: Behavior normal  Behavior is cooperative           Cognition and Memory: Cognition and memory normal

## 2023-02-28 NOTE — PATIENT INSTRUCTIONS
Albuterol 2 puffs every 4-6 hours as needed for wheezing  PFT's ordered  You may benefit from seeing a lung doctor  Please call the sleep center to schedule an appointment to address your sleep apnea  [phone]  Continue your other medications  Contact us with any new or concerning symptoms  Please complete fasting labs

## 2023-03-04 PROBLEM — R06.2 WHEEZING: Status: ACTIVE | Noted: 2023-03-04

## 2023-03-04 NOTE — ASSESSMENT & PLAN NOTE
Lab Results   Component Value Date    EGFR 71 06/29/2022    EGFR 50 06/28/2022    EGFR 63 06/23/2022    CREATININE 1 13 06/29/2022    CREATININE 1 50 (H) 06/28/2022    CREATININE 1 25 06/23/2022     Updated lab work ordered  Avoiding nephrotoxic agents

## 2023-03-04 NOTE — ASSESSMENT & PLAN NOTE
History of atrial fibrillation in 2018 for which he underwent cardioversion 03/2018  Tachycardia induced cardiomyopathy noted at that time with an EF of 20% which subsequently improved after cardioversion to 62%  Patient underwent repeat KAYLYN guided cardioversion 08/06/2020 with successful conversion to normal sinus rhythm  He presented back to [address] [place] [address] on 9/24/2021 in A fib with RVR  KAYLYN 9/29/21 showed left atrial appendage thrombus and cardioversion was aborted  Estimated EF during KAYLYN at 25-30%  He remained on Eliquis 5 mg BID uninterrupted and KAYLYN was performed 11/10/2021 and 2/2/2022 with continued evidence of MOLLY thrombus  Cardioversion was aborted x 2  Reviewed last KAYLYN images with Dr Satish Ricci who felt artifact was present without thrombus  Successful cardioversion 3/16/2022  ECG 4/19/2022 shows atrial fibrillation with RVR ( bpm)  Sleep study done 5/2022 showed moderate obstructive sleep apnea and significant hypoxia, referred to sleep medicine with pending evaluation  Patient did undergo atrial fibrillation ablation using cryotherapy 06/28/2022  Patient was maintained on amiodarone for 3 months postprocedure and was stopped 11/11/2022  Patient feels well without palpitations 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

## 2023-03-04 NOTE — ASSESSMENT & PLAN NOTE
Noted on exam today  History of wheezing associated with Covid infection  Given albuterol HFA and PFT's ordered  Plan for pulmonary evaluation

## 2023-03-04 NOTE — ASSESSMENT & PLAN NOTE
Lipid panel 8/4/2020:     HDL 40  LDL 80  Currently on atorvastatin 40mg daily  Given order for updated lipid panel

## 2023-03-04 NOTE — ASSESSMENT & PLAN NOTE
Wt Readings from Last 3 Encounters:   02/28/23 96 6 kg (213 lb)   11/30/22 (P) 97 1 kg (214 lb)   10/27/22 97 1 kg (214 lb)     Appears euvolemic  Not on diuretic therapy at present  Reviewed 2g sodium diet, 2L fluid restriction and need for daily weights  He will report 3 pound weight gain in 1 day/5 pound weight gain in 1 week, shortness of breath, leg swelling, or any other concerns  Will continue to monitor

## 2023-03-04 NOTE — ASSESSMENT & PLAN NOTE
New diagnosis of moderate sleep apnea by home sleep study 5/4/2022  We reviewed importance of sleep apnea management in the setting of atrial fibrillation  He has been given the contact info for sleep medicine and again encouraged to schedule

## 2023-03-08 ENCOUNTER — HOSPITAL ENCOUNTER (OUTPATIENT)
Dept: PULMONOLOGY | Facility: HOSPITAL | Age: 58
Discharge: HOME | End: 2023-03-08

## 2023-03-08 DIAGNOSIS — R06.2 WHEEZING: ICD-10-CM

## 2023-03-08 RX ORDER — ALBUTEROL SULFATE 2.5 MG/3ML
2.5 SOLUTION RESPIRATORY (INHALATION) ONCE AS NEEDED
Status: COMPLETED | OUTPATIENT
Start: 2023-03-08 | End: 2023-03-08

## 2023-03-08 RX ADMIN — ALBUTEROL SULFATE 2.5 MG: 2.5 SOLUTION RESPIRATORY (INHALATION) at 14:57

## 2023-03-14 ENCOUNTER — TELEPHONE (OUTPATIENT)
Dept: CARDIOLOGY CLINIC | Facility: CLINIC | Age: 58
End: 2023-03-14

## 2023-03-14 DIAGNOSIS — R94.2 ABNORMAL PFT: ICD-10-CM

## 2023-03-14 DIAGNOSIS — R06.2 WHEEZING: Primary | ICD-10-CM

## 2023-03-14 NOTE — TELEPHONE ENCOUNTER
Referral created  Can you schedule with pulm for evaluation of wheezing since Covid, PFT shows obstruction  Thank you!

## 2023-03-14 NOTE — TELEPHONE ENCOUNTER
----- Message from Ebenezer Diamond 81 sent at 3/13/2023  3:05 PM EDT -----  Please let patient know his PFT's are abnormal  I do recommend pulmonary eval at our office if he is agreeable  Thank you!

## 2023-03-16 DIAGNOSIS — G25.81 RESTLESS LEG: ICD-10-CM

## 2023-03-16 RX ORDER — ROPINIROLE 0.5 MG/1
TABLET, FILM COATED ORAL
Qty: 30 TABLET | Refills: 0 | OUTPATIENT
Start: 2023-03-16

## 2023-03-16 NOTE — TELEPHONE ENCOUNTER
Pt coco stating he would like to speak to someone about his medication Ropinirole he thought we were suppose to be calling into the pharm not his PCP    [phone]

## 2023-03-20 ENCOUNTER — TELEPHONE (OUTPATIENT)
Dept: CARDIOLOGY CLINIC | Facility: CLINIC | Age: 58
End: 2023-03-20

## 2023-03-20 NOTE — TELEPHONE ENCOUNTER
----- Message from Ebenezer Diamond 81 sent at 3/17/2023  2:01 PM EDT -----  I received the BMP for Ulises Balls, but the lipid panel appears to not have been done  Can you ask [facility] to add on lipid panel? Metabolic panel is WNL  Thank you!

## 2023-03-24 ENCOUNTER — TELEPHONE (OUTPATIENT)
Dept: CARDIOLOGY CLINIC | Facility: CLINIC | Age: 58
End: 2023-03-24

## 2023-04-01 ENCOUNTER — HOSPITAL ENCOUNTER (EMERGENCY)
Facility: HOSPITAL | Age: 58
Discharge: HOME | End: 2023-04-02
Attending: EMERGENCY MEDICINE

## 2023-04-01 ENCOUNTER — APPOINTMENT (EMERGENCY)
Dept: RADIOLOGY | Facility: HOSPITAL | Age: 58
End: 2023-04-01

## 2023-04-01 VITALS
BODY MASS INDEX: 25.83 KG/M2 | WEIGHT: 194.89 LBS | HEART RATE: 69 BPM | RESPIRATION RATE: 17 BRPM | DIASTOLIC BLOOD PRESSURE: 82 MMHG | SYSTOLIC BLOOD PRESSURE: 133 MMHG | OXYGEN SATURATION: 95 % | HEIGHT: 73 IN | TEMPERATURE: 97.1 F

## 2023-04-01 DIAGNOSIS — S39.012A STRAIN OF LUMBAR REGION, INITIAL ENCOUNTER: Primary | ICD-10-CM

## 2023-04-01 RX ORDER — OXYCODONE HYDROCHLORIDE AND ACETAMINOPHEN 5; 325 MG/1; MG/1
1 TABLET ORAL ONCE
Status: COMPLETED | OUTPATIENT
Start: 2023-04-01 | End: 2023-04-01

## 2023-04-01 RX ORDER — KETOROLAC TROMETHAMINE 30 MG/ML
30 INJECTION, SOLUTION INTRAMUSCULAR; INTRAVENOUS ONCE
Status: DISCONTINUED | OUTPATIENT
Start: 2023-04-01 | End: 2023-04-01

## 2023-04-01 RX ADMIN — OXYCODONE HYDROCHLORIDE AND ACETAMINOPHEN 1 TABLET: 5; 325 TABLET ORAL at 23:24

## 2023-04-01 NOTE — Clinical Note
Rebel Bowers was seen and treated in our emergency department on 4/1/2023  Diagnosis:     Willie Young  may return to work on return date  He may return on this date: 04/05/2023         If you have any questions or concerns, please don't hesitate to call        Corazon Rousseau, DO    ______________________________           _______________          _______________  Hospital Representative                              Date                                Time

## 2023-04-02 RX ORDER — CYCLOBENZAPRINE HCL 10 MG
10 TABLET ORAL 2 TIMES DAILY PRN
Qty: 12 TABLET | Refills: 0 | Status: SHIPPED | OUTPATIENT
Start: 2023-04-02 | End: 2023-04-02 | Stop reason: SDUPTHER

## 2023-04-02 RX ORDER — CYCLOBENZAPRINE HCL 10 MG
10 TABLET ORAL 2 TIMES DAILY PRN
Qty: 12 TABLET | Refills: 0 | Status: SHIPPED | OUTPATIENT
Start: 2023-04-02 | End: 2023-04-03 | Stop reason: SDUPTHER

## 2023-04-02 RX ORDER — TRAMADOL HYDROCHLORIDE 50 MG/1
50 TABLET ORAL ONCE
Status: DISCONTINUED | OUTPATIENT
Start: 2023-04-02 | End: 2023-04-02

## 2023-04-02 NOTE — ED PROVIDER NOTES
History  Chief Complaint   Patient presents with   • Back Pain     Pt began having back pain since lifting a tub of ashes Monday  Pt took Tylenol @ 1400 today with little relief  Patient is a 59-year-old male presenting to the emergency department complaining of low back pain that is been bothering him for the past few days, states it started on Monday after he lifted a heavy tub of ashes into a dump truck, he felt a twinge in his back at the time but finished a day at work and then work throughout the next day with minor pain, however the next day woke up with more severe pain, reports it does increase when he moves a certain way, denies any vomiting or diarrhea but does get nausea with the severe pain, no fever or chills, no dysuria or hematuria, no history of similar symptoms previously, no numbness or tingling in the groin area or distal extremities at this time          Prior to Admission Medications   Prescriptions Last Dose Informant Patient Reported? Taking?    Eliquis 5 MG   No No   Sig: Take 1 tablet by mouth twice daily   albuterol (Proventil HFA) 90 mcg/act inhaler   No No   Sig: Inhale 2 puffs every 4 (four) hours as needed for wheezing   atorvastatin (LIPITOR) 40 mg tablet   No No   Sig: TAKE 1 TABLET (40 MG TOTAL) BY MOUTH ONCE DAILY WITH  DINNER   magnesium oxide 400 mg TABS   No No   Sig: Take 1 tablet (400 mg total) by mouth 2 (two) times a day   metoprolol succinate (TOPROL-XL) 100 mg 24 hr tablet   No No   Sig: TAKE 1 TABLET (100 MG TOTAL) BY MOUTH TWICE DAILY      Facility-Administered Medications: None       Past Medical History:   Diagnosis Date   • Atrial fibrillation (HCC)    • Hyperlipidemia    • Hypertension    • Overweight    • Systolic heart failure (HCC)    • Tachycardia induced cardiomyopathy [facility])        Past Surgical History:   Procedure Laterality Date   • CARDIAC CATHETERIZATION  02/22/2018   • CARDIAC ELECTROPHYSIOLOGY PROCEDURE N/A 6/28/2022    Procedure: Cardiac eps/afib ablation post wall;  Surgeon: Karen Humphrey MD;  Location: [facility]; Service: Cardiology   • CARDIOVERSION  03/2018   • CARDIOVERSION  08/06/2020   • INGUINAL HERNIA REPAIR     • SINUS SURGERY     • WISDOM TOOTH EXTRACTION         Family History   Problem Relation Age of Onset   • No Known Problems Mother    • Colon cancer Father      I have reviewed and agree with the history as documented  E-Cigarette/Vaping   • E-Cigarette Use Never User      E-Cigarette/Vaping Substances     Social History     Tobacco Use   • Smoking status: Former     Types: Cigarettes   • Smokeless tobacco: Never   Vaping Use   • Vaping Use: Never used   Substance Use Topics   • Alcohol use: Not Currently     Comment: none currently   • Drug use: Never       Review of Systems   Constitutional: Negative  HENT: Negative  Eyes: Negative  Respiratory: Negative  Cardiovascular: Negative  Gastrointestinal: Negative  Endocrine: Negative  Genitourinary: Negative  Musculoskeletal: Positive for back pain  Skin: Negative  Allergic/Immunologic: Negative  Neurological: Negative  Hematological: Negative  Psychiatric/Behavioral: Negative  Physical Exam  Physical Exam  Constitutional:       Appearance: He is well-developed  Comments: Appears in moderate pain   HENT:      Head: Normocephalic and atraumatic  Eyes:      Conjunctiva/sclera: Conjunctivae normal       Pupils: Pupils are equal, round, and reactive to light  Cardiovascular:      Rate and Rhythm: Normal rate  Pulmonary:      Effort: Pulmonary effort is normal    Abdominal:      Palpations: Abdomen is soft  Musculoskeletal:         General: Normal range of motion  Cervical back: Normal range of motion and neck supple  Back:       Comments: Unable to reproduce pain with palpation, no bony deformity   Skin:     General: Skin is warm and dry     Neurological:      Mental Status: He is alert and oriented to person, place, and time  Vital Signs  ED Triage Vitals [04/01/23 2253]   Temperature Pulse Respirations Blood Pressure SpO2   (!) 97 1 °F (36 2 °C) 69 17 133/82 95 %      Temp Source Heart Rate Source Patient Position - Orthostatic VS BP Location FiO2 (%)   Temporal Monitor Sitting Right arm --      Pain Score       10 - Worst Possible Pain           Vitals:    04/01/23 2253   BP: 133/82   Pulse: 69   Patient Position - Orthostatic VS: Sitting         Visual Acuity      ED Medications  Medications   oxyCODONE-acetaminophen (PERCOCET) 5-325 mg per tablet 1 tablet (1 tablet Oral Given 4/1/23 7465)       Diagnostic Studies  Results Reviewed     None                 XR spine lumbar 2 or 3 views injury    (Results Pending)              Procedures  Procedures         ED Course                               SBIRT 20yo+    Flowsheet Row Most Recent Value   SBIRT (25 yo +)    In order to provide better care to our patients, we are screening all of our patients for alcohol and drug use  Would it be okay to ask you these screening questions?  No Filed at: 04/01/2023 6567                    Medical Decision Making  Patient is a 68-year-old male presenting to the emergency department complaining of low back pain that is been going on several days since he lifted something heavy while at work, he reports some pain in his mid low back, no radiation down the legs, no saddle anesthesia, no bowel or bladder dysfunction, no fever or chills, taking Tylenol at home which provides minimal relief, imaging findings consistent with degenerative changes in the low back, discussed with patient at bedside, provided with 1 dose of pain medication and a prescription for muscle relaxers, advise close follow-up with PCP, return if symptoms worsen, patient acknowledges understanding and agreement with this plan    Strain of lumbar region, initial encounter: acute illness or injury  Amount and/or Complexity of Data Reviewed  Radiology: ordered  Risk  OTC drugs  Prescription drug management  Disposition  Final diagnoses:   Strain of lumbar region, initial encounter     Time reflects when diagnosis was documented in both MDM as applicable and the Disposition within this note     Time User Action Codes Description Comment    4/2/2023 12:08 AM Shivam Almazan Add [S39 012A] Strain of lumbar region, initial encounter       ED Disposition     ED Disposition   Discharge    Condition   Stable    Date/Time   Sun Apr 2, 2023 12:08 AM    Comment   Charity Dominguez discharge to home/self care  Follow-up Information     Follow up With Specialties Details Why Contact Info    Carmen Spring MD Family Medicine In 1 week  [address]  [phone]            Discharge Medication List as of 4/2/2023 12:22 AM      START taking these medications    Details   cyclobenzaprine (FLEXERIL) 10 mg tablet Take 1 tablet (10 mg total) by mouth 2 (two) times a day as needed for muscle spasms, Starting Sun 4/2/2023, Normal         CONTINUE these medications which have NOT CHANGED    Details   albuterol (Proventil HFA) 90 mcg/act inhaler Inhale 2 puffs every 4 (four) hours as needed for wheezing, Starting Tue 2/28/2023, Normal      atorvastatin (LIPITOR) 40 mg tablet TAKE 1 TABLET (40 MG TOTAL) BY MOUTH ONCE DAILY WITH  DINNER, Normal      Eliquis 5 MG Take 1 tablet by mouth twice daily, Normal      magnesium oxide 400 mg TABS Take 1 tablet (400 mg total) by mouth 2 (two) times a day, Starting Tue 2/28/2023, Until Fri 2/23/2024, Normal      metoprolol succinate (TOPROL-XL) 100 mg 24 hr tablet TAKE 1 TABLET (100 MG TOTAL) BY MOUTH TWICE DAILY, Normal             No discharge procedures on file      PDMP Review       Value Time User    PDMP Reviewed  Yes 4/2/2023 12:13 AM Corazon Rousseau DO          ED Provider  Electronically Signed by           Corazon Rousseau DO  04/02/23 0605

## 2023-04-03 RX ORDER — CYCLOBENZAPRINE HCL 10 MG
10 TABLET ORAL 2 TIMES DAILY PRN
Qty: 12 TABLET | Refills: 0 | Status: SHIPPED | OUTPATIENT
Start: 2023-04-03 | End: 2023-04-03 | Stop reason: SDUPTHER

## 2023-04-03 RX ORDER — CYCLOBENZAPRINE HCL 10 MG
10 TABLET ORAL 2 TIMES DAILY PRN
Qty: 12 TABLET | Refills: 0 | Status: SHIPPED | OUTPATIENT
Start: 2023-04-03

## 2023-08-11 ENCOUNTER — VBI (OUTPATIENT)
Dept: ADMINISTRATIVE | Facility: OTHER | Age: 58
End: 2023-08-11

## 2023-12-22 DIAGNOSIS — I48.0 PAROXYSMAL ATRIAL FIBRILLATION (HCC): ICD-10-CM

## 2023-12-22 DIAGNOSIS — E78.2 MIXED HYPERLIPIDEMIA: ICD-10-CM

## 2023-12-22 RX ORDER — ATORVASTATIN CALCIUM 40 MG/1
40 TABLET, FILM COATED ORAL
Qty: 90 TABLET | Refills: 0 | Status: SHIPPED | OUTPATIENT
Start: 2023-12-22

## 2023-12-22 RX ORDER — METOPROLOL SUCCINATE 100 MG/1
100 TABLET, EXTENDED RELEASE ORAL 2 TIMES DAILY
Qty: 180 TABLET | Refills: 0 | Status: SHIPPED | OUTPATIENT
Start: 2023-12-22

## 2023-12-26 NOTE — TELEPHONE ENCOUNTER
Left message for patient to call back and schedule a follow up appointment    Informed patient that medication was sent to pharmacy

## 2023-12-28 NOTE — TELEPHONE ENCOUNTER
Left message for patient to call back and schedule a follow up appointment     Informed patient that medication was sent to pharmacy          Attempted to reached patient 3 x mailed letter

## 2024-01-30 NOTE — ASSESSMENT & PLAN NOTE
History of atrial fibrillation in 2018 for which he underwent cardioversion 03/2018  Tachycardia induced cardiomyopathy noted at that time with an EF of 20% which subsequently improved after cardioversion to 62%  Patient underwent repeat KAYLYN guided cardioversion 08/06/2020 with successful conversion to normal sinus rhythm  He presented back to [address] [place] [address] on 9/24/2021 in A fib with RVR  KAYLYN 9/29/21 showed left atrial appendage thrombus and cardioversion was aborted  Estimated EF during KAYLYN at 25-30%  He remained on Eliquis 5 mg BID uninterrupted and KAYLYN was performed 11/10/2021 and 2/2/2022 with continued evidence of MOLLY thrombus  Cardioversion was aborted x 2  Reviewed last KAYLYN images with Dr Danny Devi who felt artifact was present without thrombus  Successful cardioversion 3/16/2022  ECG 4/19/2022 shows atrial fibrillation with RVR ( bpm)  Continue digoxin 0 125mg daily, diltiazem  mg daily, and metoprolol succinate 125 mg BID  HR's somewhat improved today  Awaiting appointment with EP, Dr Hakeem Aguirre, scheduled for next week  Patient has been abstinent from alcohol intake and I applauded his efforts  Sleep study reveals moderate sleep apnea and referral to sleep medicine created  Stress test scheduled for next week  [No Acute Distress] : no acute distress [Well Nourished] : well nourished [Well Developed] : well developed [Well-Appearing] : well-appearing [Normal Sclera/Conjunctiva] : normal sclera/conjunctiva [PERRL] : pupils equal round and reactive to light [EOMI] : extraocular movements intact [Normal Outer Ear/Nose] : the outer ears and nose were normal in appearance [Normal Oropharynx] : the oropharynx was normal [No JVD] : no jugular venous distention [No Lymphadenopathy] : no lymphadenopathy [Supple] : supple [Thyroid Normal, No Nodules] : the thyroid was normal and there were no nodules present [No Respiratory Distress] : no respiratory distress  [No Accessory Muscle Use] : no accessory muscle use [Clear to Auscultation] : lungs were clear to auscultation bilaterally [Normal Rate] : normal rate  [Regular Rhythm] : with a regular rhythm [Normal S1, S2] : normal S1 and S2 [No Murmur] : no murmur heard [No Carotid Bruits] : no carotid bruits [No Abdominal Bruit] : a ~M bruit was not heard ~T in the abdomen [No Varicosities] : no varicosities [Pedal Pulses Present] : the pedal pulses are present [No Edema] : there was no peripheral edema [No Palpable Aorta] : no palpable aorta [No Extremity Clubbing/Cyanosis] : no extremity clubbing/cyanosis [Soft] : abdomen soft [Non Tender] : non-tender [Non-distended] : non-distended [No Masses] : no abdominal mass palpated [No HSM] : no HSM [Normal Bowel Sounds] : normal bowel sounds [Normal Posterior Cervical Nodes] : no posterior cervical lymphadenopathy [Normal Anterior Cervical Nodes] : no anterior cervical lymphadenopathy [No CVA Tenderness] : no CVA  tenderness [No Spinal Tenderness] : no spinal tenderness [No Joint Swelling] : no joint swelling [Grossly Normal Strength/Tone] : grossly normal strength/tone [No Rash] : no rash [Coordination Grossly Intact] : coordination grossly intact [No Focal Deficits] : no focal deficits [Normal Gait] : normal gait [Deep Tendon Reflexes (DTR)] : deep tendon reflexes were 2+ and symmetric [Normal Affect] : the affect was normal [Normal Insight/Judgement] : insight and judgment were intact

## 2024-03-18 DIAGNOSIS — E78.2 MIXED HYPERLIPIDEMIA: ICD-10-CM

## 2024-03-18 DIAGNOSIS — I48.0 PAROXYSMAL ATRIAL FIBRILLATION (HCC): ICD-10-CM

## 2024-03-18 DIAGNOSIS — I51.3 LEFT ATRIAL THROMBUS: ICD-10-CM

## 2024-03-18 RX ORDER — METOPROLOL SUCCINATE 100 MG/1
100 TABLET, EXTENDED RELEASE ORAL 2 TIMES DAILY
Qty: 180 TABLET | Refills: 0 | Status: SHIPPED | OUTPATIENT
Start: 2024-03-18

## 2024-03-18 RX ORDER — ATORVASTATIN CALCIUM 40 MG/1
TABLET, FILM COATED ORAL
Qty: 90 TABLET | Refills: 0 | Status: SHIPPED | OUTPATIENT
Start: 2024-03-18

## 2024-03-18 RX ORDER — APIXABAN 5 MG/1
TABLET, FILM COATED ORAL
Qty: 60 TABLET | Refills: 0 | Status: SHIPPED | OUTPATIENT
Start: 2024-03-18

## 2024-03-18 NOTE — LETTER
[facility] [address]  [address]LO[address]  [facility] [address]  Phone#  [phone]  Fax#  [phone]      March 19, 2024      Dear:   Avtar Hernandez         Our office has attempted to contact you several times regarding your Appointment.  Could you please contact our office at [phone].    Thank you.     [facility]

## 2024-03-19 NOTE — TELEPHONE ENCOUNTER
Mailed.    Nurses Note -- 4 Eyes      4/26/2023   6:10 AM      Skin assessed during: Q Shift Change      [] No Altered Skin Integrity Present    []Prevention Measures Documented      [x] Yes- Altered Skin Integrity Present or Discovered   [] LDA Added if Not in Epic (Describe Wound)   [] New Altered Skin Integrity was Present on Admit and Documented in LDA   [] Wound Image Taken    Wound Care Consulted? Yes    Attending Nurse:  Aruna Curtis RN     Second RN/Staff Member:  robbie flores

## 2024-04-12 DIAGNOSIS — I48.0 PAROXYSMAL ATRIAL FIBRILLATION (HCC): ICD-10-CM

## 2024-04-12 DIAGNOSIS — I51.3 LEFT ATRIAL THROMBUS: ICD-10-CM

## 2024-04-16 RX ORDER — APIXABAN 5 MG/1
TABLET, FILM COATED ORAL
Qty: 60 TABLET | Refills: 0 | Status: SHIPPED | OUTPATIENT
Start: 2024-04-16

## 2024-04-16 NOTE — TELEPHONE ENCOUNTER
Called patient no answer unable to leave message mailbox is full       Attempted to reach patient 3x mailed letter    Forwarding back to clinical

## 2024-05-04 DIAGNOSIS — I48.0 PAROXYSMAL ATRIAL FIBRILLATION (HCC): ICD-10-CM

## 2024-05-06 RX ORDER — METOPROLOL SUCCINATE 100 MG/1
100 TABLET, EXTENDED RELEASE ORAL 2 TIMES DAILY
Qty: 60 TABLET | Refills: 0 | Status: SHIPPED | OUTPATIENT
Start: 2024-05-06

## 2024-05-09 NOTE — TELEPHONE ENCOUNTER
Left message for patient to call back and schedule a follow up appointment      Attempted to reach patient 3x mailed letter

## 2024-06-20 ENCOUNTER — TELEPHONE (OUTPATIENT)
Age: 59
End: 2024-06-20

## 2024-06-20 NOTE — TELEPHONE ENCOUNTER
Pt called to schedule appt with his Cardiology office.  Informed pt that he contacted Urology in error.  Trans pt to his Cardiology office

## 2024-06-21 ENCOUNTER — TELEPHONE (OUTPATIENT)
Dept: OTHER | Facility: OTHER | Age: 59
End: 2024-06-21

## 2024-06-21 NOTE — TELEPHONE ENCOUNTER
Patient calling in to schedule a follow up appt. Usually sees Alejandra CORNEJO    Please reach out to patient to schedule appt. Thank you.

## 2024-06-24 DIAGNOSIS — I48.0 PAROXYSMAL ATRIAL FIBRILLATION (HCC): ICD-10-CM

## 2024-06-24 DIAGNOSIS — I51.3 LEFT ATRIAL THROMBUS: ICD-10-CM

## 2024-06-24 DIAGNOSIS — E78.2 MIXED HYPERLIPIDEMIA: ICD-10-CM

## 2024-06-24 NOTE — TELEPHONE ENCOUNTER
Caller: Avtar     Doctor: Jatin Florian    Reason for call: pt would like for jatin to give him a call back because he has a few questions non urgent but would like to ask her some things.     Call back#: [phone]

## 2024-06-24 NOTE — TELEPHONE ENCOUNTER
Medication: atorvastatin (LIPITOR) 40 mg tablet     Dose/Frequency: TAKE 1 TABLET BY MOUTH ONCE DAILY WITH SUPPER     Quantity: 90 tablet       Office:   [] PCP/Provider -   [x] Speciality/Provider -     Does the patient have enough for 3 days?   [x] Yes   [] No - Send as HP to POD    Medication:     Eliquis 5 MG       Dose/Frequency:  Take 1 tablet by mouth twice daily, Normal     Quantity:  60 tablet         Office:   [] PCP/Provider -   [x] Speciality/Provider -     Does the patient have enough for 3 days?   [] Yes   [x] No - Send as HP to POD    [facility]     .Medication: magnesium oxide 400 mg TABS ()     Dose/Frequency: Take 1 tablet (400 mg total) by mouth 2 (two) times a day, Starting 2023, Until 2024     Quantity: 60 tablet       Office:   [] PCP/Provider -   [x] Speciality/Provider -     Does the patient have enough for 3 days?   [] Yes   [x] No - Send as HP to POD

## 2024-06-25 ENCOUNTER — TELEPHONE (OUTPATIENT)
Dept: CARDIOLOGY CLINIC | Facility: CLINIC | Age: 59
End: 2024-06-25

## 2024-06-25 RX ORDER — LANOLIN ALCOHOL/MO/W.PET/CERES
400 CREAM (GRAM) TOPICAL 2 TIMES DAILY
Qty: 60 TABLET | Refills: 2 | Status: SHIPPED | OUTPATIENT
Start: 2024-06-25

## 2024-06-25 RX ORDER — ATORVASTATIN CALCIUM 40 MG/1
40 TABLET, FILM COATED ORAL DAILY
Qty: 30 TABLET | Refills: 2 | Status: SHIPPED | OUTPATIENT
Start: 2024-06-25

## 2024-06-25 NOTE — TELEPHONE ENCOUNTER
Pt lm that he needs his Eliquis refilled he has not taken his Eliquis for 1 week now also he needs all of his other medications refilled.  Pt did not say the names of the other medications he needs.  Please call pt [phone]

## 2024-06-25 NOTE — TELEPHONE ENCOUNTER
Refilled medications until next appt. Please find out what his questions are - the message states he wishes to discuss something

## 2024-07-13 DIAGNOSIS — I48.0 PAROXYSMAL ATRIAL FIBRILLATION (HCC): ICD-10-CM

## 2024-07-15 RX ORDER — METOPROLOL SUCCINATE 100 MG/1
100 TABLET, EXTENDED RELEASE ORAL 2 TIMES DAILY
Qty: 60 TABLET | Refills: 3 | Status: SHIPPED | OUTPATIENT
Start: 2024-07-15

## 2024-08-07 ENCOUNTER — OFFICE VISIT (OUTPATIENT)
Dept: CARDIOLOGY CLINIC | Facility: CLINIC | Age: 59
End: 2024-08-07
Payer: COMMERCIAL

## 2024-08-07 VITALS
TEMPERATURE: 97.2 F | HEART RATE: 57 BPM | WEIGHT: 206.8 LBS | SYSTOLIC BLOOD PRESSURE: 118 MMHG | OXYGEN SATURATION: 97 % | BODY MASS INDEX: 27.41 KG/M2 | HEIGHT: 73 IN | DIASTOLIC BLOOD PRESSURE: 80 MMHG

## 2024-08-07 DIAGNOSIS — I48.0 PAROXYSMAL ATRIAL FIBRILLATION (HCC): Primary | ICD-10-CM

## 2024-08-07 DIAGNOSIS — E78.2 MIXED HYPERLIPIDEMIA: ICD-10-CM

## 2024-08-07 DIAGNOSIS — I10 BENIGN ESSENTIAL HTN: ICD-10-CM

## 2024-08-07 DIAGNOSIS — N18.32 STAGE 3B CHRONIC KIDNEY DISEASE (HCC): ICD-10-CM

## 2024-08-07 DIAGNOSIS — I50.22 CHRONIC SYSTOLIC CONGESTIVE HEART FAILURE (HCC): ICD-10-CM

## 2024-08-07 DIAGNOSIS — G47.33 OSA (OBSTRUCTIVE SLEEP APNEA): ICD-10-CM

## 2024-08-07 DIAGNOSIS — R06.2 WHEEZING: ICD-10-CM

## 2024-08-07 DIAGNOSIS — I42.9 CARDIOMYOPATHY, UNSPECIFIED TYPE (HCC): ICD-10-CM

## 2024-08-07 PROCEDURE — 93000 ELECTROCARDIOGRAM COMPLETE: CPT | Performed by: NURSE PRACTITIONER

## 2024-08-07 PROCEDURE — 99214 OFFICE O/P EST MOD 30 MIN: CPT | Performed by: NURSE PRACTITIONER

## 2024-08-07 RX ORDER — ATORVASTATIN CALCIUM 40 MG/1
40 TABLET, FILM COATED ORAL DAILY
Qty: 90 TABLET | Refills: 3 | Status: SHIPPED | OUTPATIENT
Start: 2024-08-07

## 2024-08-07 RX ORDER — ALBUTEROL SULFATE 90 UG/1
2 AEROSOL, METERED RESPIRATORY (INHALATION) EVERY 4 HOURS PRN
Qty: 6.7 G | Refills: 0 | Status: SHIPPED | OUTPATIENT
Start: 2024-08-07

## 2024-08-07 RX ORDER — METOPROLOL SUCCINATE 100 MG/1
100 TABLET, EXTENDED RELEASE ORAL 2 TIMES DAILY
Qty: 180 TABLET | Refills: 3 | Status: SHIPPED | OUTPATIENT
Start: 2024-08-07

## 2024-08-07 RX ORDER — LANOLIN ALCOHOL/MO/W.PET/CERES
400 CREAM (GRAM) TOPICAL 2 TIMES DAILY
Qty: 180 TABLET | Refills: 3 | Status: SHIPPED | OUTPATIENT
Start: 2024-08-07

## 2024-08-07 RX ORDER — MONTELUKAST SODIUM 10 MG/1
10 TABLET ORAL
COMMUNITY

## 2024-08-07 RX ORDER — ROPINIROLE 0.5 MG/1
0.5 TABLET, FILM COATED ORAL DAILY
COMMUNITY
Start: 2024-05-06

## 2024-08-07 NOTE — ASSESSMENT & PLAN NOTE
PFT's reveal mild obstructive disease.  He was referred to pulmonology but did not go.  Now on Singulair through PCP  Albuterol renewed per patient request

## 2024-08-07 NOTE — ASSESSMENT & PLAN NOTE
History of atrial fibrillation in 2018 for which he underwent cardioversion 03/2018.  Tachycardia induced cardiomyopathy noted at that time with an EF of 20% which subsequently improved after cardioversion to 62%.  Patient underwent repeat KAYLYN guided cardioversion 08/06/2020 with successful conversion to normal sinus rhythm.  He presented back to [facility] ER on 9/24/2021 in A fib with RVR.  KAYLYN 9/29/21 showed left atrial appendage thrombus and cardioversion was aborted. Estimated EF during KAYLYN at 25-30%.  He remained on Eliquis 5 mg BID uninterrupted and KAYLYN was performed 11/10/2021 and 2/2/2022 with continued evidence of MOLLY thrombus. Cardioversion was aborted x 2.  Reviewed last KAYLYN images with Dr. Puga who felt artifact was present without thrombus.  Successful cardioversion 3/16/2022.  ECG 4/19/2022 shows atrial fibrillation with RVR ( bpm).  Sleep study done 5/2022 showed moderate obstructive sleep apnea and significant hypoxia, referred to sleep medicine with pending evaluation.  Patient did undergo atrial fibrillation ablation using cryotherapy 06/28/2022.  Patient was maintained on amiodarone for 3 months postprocedure and was stopped 11/11/2022.  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   ECG today shows SB at 56 bpm.

## 2024-08-07 NOTE — ASSESSMENT & PLAN NOTE
Wt Readings from Last 3 Encounters:   08/07/24 93.8 kg (206 lb 12.8 oz)   04/01/23 88.4 kg (194 lb 14.2 oz)   02/28/23 96.6 kg (213 lb)     Appears euvolemic.  Not on diuretic therapy at present.  Reviewed 2g sodium diet, 2L fluid restriction and need for daily weights.  He will report 3 pound weight gain in 1 day/5 pound weight gain in 1 week, shortness of breath, leg swelling, or any other concerns.  Will continue to monitor.

## 2024-08-07 NOTE — PROGRESS NOTES
Cardiology Follow Up    Avtar Hernandez  1965  [id number]  [facility] [place] [id number]  [address]  [facility] [address] [phone]    Avtar presents for follow up of atrial fibrillation, non-ischemic cardiomyopathy, chronic systolic congestive heart failure, hypertension and hyperlipidemia.      1. Paroxysmal atrial fibrillation (HCC)  Assessment & Plan:  History of atrial fibrillation in 2018 for which he underwent cardioversion 03/2018.  Tachycardia induced cardiomyopathy noted at that time with an EF of 20% which subsequently improved after cardioversion to 62%.  Patient underwent repeat KAYLYN guided cardioversion 08/06/2020 with successful conversion to normal sinus rhythm.  He presented back to [facility] ER on 9/24/2021 in A fib with RVR.  KAYLYN 9/29/21 showed left atrial appendage thrombus and cardioversion was aborted. Estimated EF during KAYLYN at 25-30%.  He remained on Eliquis 5 mg BID uninterrupted and KAYLYN was performed 11/10/2021 and 2/2/2022 with continued evidence of MOLLY thrombus. Cardioversion was aborted x 2.  Reviewed last KAYLYN images with Dr. Puga who felt artifact was present without thrombus.  Successful cardioversion 3/16/2022.  ECG 4/19/2022 shows atrial fibrillation with RVR ( bpm).  Sleep study done 5/2022 showed moderate obstructive sleep apnea and significant hypoxia, referred to sleep medicine with pending evaluation.  Patient did undergo atrial fibrillation ablation using cryotherapy 06/28/2022.  Patient was maintained on amiodarone for 3 months postprocedure and was stopped 11/11/2022.  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   ECG today shows SB at 56 bpm.  Orders:  -     CBC and Platelet; Future  -     apixaban (Eliquis) 5 mg; Take 1 tablet (5 mg total) by mouth 2 (two) times a day  -     magnesium Oxide (MAG-OX) 400 mg TABS; Take 1 tablet (400 mg total) by  mouth 2 (two) times a day  -     metoprolol succinate (TOPROL-XL) 100 mg 24 hr tablet; Take 1 tablet (100 mg total) by mouth 2 (two) times a day  2. Cardiomyopathy, unspecified type (HCC)  Assessment & Plan:  Patient with prior history of tachycardia induced cardiomyopathy with prior EF of 20% that improved to 62% post cardioversion in 2018.  EF 8/5/2020 25-30%.  KAYLYN 9/29/2021 with EF 25-30%.  KAYLYN 3/16/2022 EF 40%.   There could also be some component of ETOH induced cardiomyopathy.   Continue cardiomyopathy specific metoprolol succinate 100 mg BID.  Echocardiogram 10/27/2022 showed an EF of 60%.   Patient has noted significant improvement in his symptoms with restoration/maintenance of normal sinus rhythm.   Repeat echo at this time for serial monitoring. He does report some dyspnea on exertion which is sporadic.  Orders:  -     Echo complete w/ contrast if indicated; Future; Expected date: 08/07/2024  3. Chronic systolic congestive heart failure (HCC)  Assessment & Plan:    Wt Readings from Last 3 Encounters:   08/07/24 93.8 kg (206 lb 12.8 oz)   04/01/23 88.4 kg (194 lb 14.2 oz)   02/28/23 96.6 kg (213 lb)     Appears euvolemic.  Not on diuretic therapy at present.  Reviewed 2g sodium diet, 2L fluid restriction and need for daily weights.  He will report 3 pound weight gain in 1 day/5 pound weight gain in 1 week, shortness of breath, leg swelling, or any other concerns.  Will continue to monitor.  Orders:  -     Echo complete w/ contrast if indicated; Future; Expected date: 08/07/2024  4. Benign essential HTN  Assessment & Plan:  Blood pressure is at goal.  Continue metoprolol succinate 100 mg BID.  Updated lab work ordered.  5. Mixed hyperlipidemia  Assessment & Plan:  Lipid panel 8/4/2020: . . HDL 40. LDL 80.  Currently on atorvastatin 40mg daily.  Given order for updated lipid panel.  Orders:  -     Lipid Panel with Direct LDL reflex; Future  -     Comprehensive metabolic panel; Future  -      atorvastatin (LIPITOR) 40 mg tablet; Take 1 tablet (40 mg total) by mouth daily  6. ALVARO (obstructive sleep apnea)  Assessment & Plan:  New diagnosis of moderate sleep apnea by home sleep study 5/4/2022.  We reviewed importance of sleep apnea management in the setting of atrial fibrillation.  He has been given the contact info for sleep medicine and again encouraged to schedule.  7. Stage 3b chronic kidney disease (HCC)  Assessment & Plan:  Lab Results   Component Value Date    EGFR 75 03/16/2023    EGFR 71 06/29/2022    EGFR 50 06/28/2022    CREATININE 1.1 03/16/2023    CREATININE 1.13 06/29/2022    CREATININE 1.50 (H) 06/28/2022     Updated lab work ordered.  Avoiding nephrotoxic agents.  8. Wheezing  Assessment & Plan:  PFT's reveal mild obstructive disease.  He was referred to pulmonology but did not go.  Now on Singulair through PCP  Albuterol renewed per patient request  Orders:  -     albuterol (Proventil HFA) 90 mcg/act inhaler; Inhale 2 puffs every 4 (four) hours as needed for wheezing     HPI  Avtar has a past medical history of paroxysmal atrial fibrillation, cardiomyopathy (possibly tachycardia induced), HFrEF, HTN, HLD.     A fib history dates back to 2018 at which time his EF was noted to be 20%. He was referred to [facility] for cardiac catheterization on 2/22/2018 which showed normal coronary arteries. He underwent cardioversion on 3/2018 and was started on amiodarone. Repeat echo 8/2018 showed improved EF to 62%. He lost his insurance and was lost to follow up.     He presented to his PCP office in 2020 for evaluation after a fall and was found to be in A fib with RVR and directed to the [facility] ER where he was admitted. Echo during that admission revealed EF 25-30%. He underwent successful KAYLYN guided cardioversion on 8/6/2020 and was started on amiodarone loading, Eliquis, and metoprolol succinate. He was then lost to follow up again.     Admitted to [facility] 9/24-9/30/2021 for a fib with RVR when he  presented with fatigue and shortness of breath x 2 weeks that felt similar to prior a fib episodes. He was without medical insurance and had run out of his medications. EF of 25-30% by KAYLYN with possible MOLLY thrombus. No cardioversion. Diltiazem was used for rate control following discussion with EP as suspected tachy-mediated cardiomyopathy. Metoprolol succinate resumed and titrated. Digoxin added. Eliquis samples provided.     He underwent repeat KAYLYN on 11/10/2021, however MOLLY thrombus was present and cardioversion was aborted. He had been faithful with Eliquis dosing.     KAYLYN/DCCV 2/2/2022 again showed concern for thrombus, but images were reviewed with Dr. Puga who felt the presumed clot was artifact.     Avtar underwent KAYLYN/CV 3/16/2022 with conversion to normal sinus rhythm.      At 4/19/2022 follow up ECG revealed a fib with RVR, rate 106 bpm. He had been compliant with his medications. Metoprolol succinate was increased to 125 mg BID. He was referred to EP, Dr. Krause, for further a fib management.     He met with Dr. Krause on 5/25/2022 and underwent atrial fibrillation cryoablation with left atrial posterior wall isolation on 6/28/2022. Amiodarone was continued for 3 months and then stopped.      Echocardiogram 10/27/2022 showed recovered LVEF of 60%.     He last followed up 2/28/2023 and then was subsequently lost to follow up.    8/7/2024: Americo presents for follow up. ECG today shows SB at 56 bpm. He reports that he has been faithful with his medications. No chest pain, lightheadedness or syncope/near syncope. No edema, orthopnea. He denies any bleeding issues. He reports dyspnea on exertion and wheezing which is intermittent. His PCP started him on Singulair and albuterol for asthma. He underwent PFT testing in 2023 which did reveal mild obstructive disease.     Medical Problems       Problem List       Elevated troponin    Abnormal TSH    Paroxysmal atrial fibrillation (HCC)    Cardiomyopathy  (HCC)    Chronic systolic congestive heart failure (HCC)    Benign essential HTN    Mixed hyperlipidemia    ALVARO (obstructive sleep apnea)    Stage 3b chronic kidney disease (HCC)    Wheezing        Past Medical History:   Diagnosis Date    Atrial fibrillation (HCC)     Hyperlipidemia     Hypertension     Overweight     Systolic heart failure (HCC)     Tachycardia induced cardiomyopathy (HCC)      Social History     Socioeconomic History    Marital status: Single     Spouse name: Not on file    Number of children: Not on file    Years of education: Not on file    Highest education level: Not on file   Occupational History    Not on file   Tobacco Use    Smoking status: Former     Types: Cigarettes    Smokeless tobacco: Never   Vaping Use    Vaping status: Never Used   Substance and Sexual Activity    Alcohol use: Not Currently     Comment: none currently    Drug use: Never    Sexual activity: Yes     Comment: Defer   Other Topics Concern    Not on file   Social History Narrative    Not on file     Social Determinants of Health     Financial Resource Strain: High Risk (8/5/2020)    Overall Financial Resource Strain (CARDIA)     Difficulty of Paying Living Expenses: Hard   Food Insecurity: No Food Insecurity (3/22/2023)    Received from [facility]    Hunger Vital Sign     Worried About Running Out of Food in the Last Year: Never true     Ran Out of Food in the Last Year: Never true   Transportation Needs: Not on file   Physical Activity: Not on file   Stress: Not on file   Social Connections: Unknown (6/18/2024)    Received from [facility]    Social Connections     How often do you feel lonely or isolated from those around you? (Adult - for ages 18 years and over): Not on file   Intimate Partner Violence: Not on file   Housing Stability: Not on file      Family History   Problem Relation Age of Onset    No Known Problems Mother     Colon cancer Father      Past Surgical History:   Procedure Laterality Date    CARDIAC  CATHETERIZATION  02/22/2018    CARDIAC ELECTROPHYSIOLOGY PROCEDURE N/A 6/28/2022    Procedure: Cardiac eps/afib ablation post wall;  Surgeon: Ruslan Krasue MD;  Location: [facility];  Service: Cardiology    CARDIOVERSION  03/2018    CARDIOVERSION  08/06/2020    INGUINAL HERNIA REPAIR      SINUS SURGERY      WISDOM TOOTH EXTRACTION         Current Outpatient Medications:     albuterol (Proventil HFA) 90 mcg/act inhaler, Inhale 2 puffs every 4 (four) hours as needed for wheezing, Disp: 6.7 g, Rfl: 0    apixaban (Eliquis) 5 mg, Take 1 tablet (5 mg total) by mouth 2 (two) times a day, Disp: 180 tablet, Rfl: 3    atorvastatin (LIPITOR) 40 mg tablet, Take 1 tablet (40 mg total) by mouth daily, Disp: 90 tablet, Rfl: 3    magnesium Oxide (MAG-OX) 400 mg TABS, Take 1 tablet (400 mg total) by mouth 2 (two) times a day, Disp: 180 tablet, Rfl: 3    metoprolol succinate (TOPROL-XL) 100 mg 24 hr tablet, Take 1 tablet (100 mg total) by mouth 2 (two) times a day, Disp: 180 tablet, Rfl: 3    montelukast (SINGULAIR) 10 mg tablet, Take 10 mg by mouth daily at bedtime, Disp: , Rfl:     rOPINIRole (REQUIP) 0.5 mg tablet, Take 0.5 mg by mouth daily, Disp: , Rfl:   No Known Allergies    Labs:     Chemistry        Component Value Date/Time    K 4.4 03/16/2023 1346     03/16/2023 1346    CO2 28 03/16/2023 1346    BUN 18 03/16/2023 1346    BUN 18 01/26/2018 0352    CREATININE 1.1 03/16/2023 1346        Component Value Date/Time    CALCIUM 9.5 03/16/2023 1346    ALKPHOS 84 06/23/2022 1703    ALKPHOS 105 01/24/2018 0435    AST 21 06/23/2022 1703    AST 30 01/24/2018 0435    ALT 33 06/23/2022 1703    ALT 53 01/24/2018 0435        Cardiac testing:  ECG 8/7/2024: Sinus bradycardia at 56 bpm. Otherwise normal ECG.    Echocardiogram 10/27/2022:  EF 60%. Grade 1 DD. Biatrial dilation. RV dilation.  Trace MR. Mild TR. PASP 54mmHg. Trace NM.  Aoric root 3.8 cm, ascending aorta 3.3 cm.     Nuclear stress test 5/24/2022:  Perfusion:  "Normal post stress perfusion. No definitive signs of ischemia on nuclear images.    Stress Function: Calculated LVEF 46%. Pt with AFIB during study with numerous beats elimated from image aquisition. Given irregular cardiac intervals with AFIB, suggest ECHO for better LVEF evaluation.    Deconditioned HR responce to exercise.  Poor exercise tolerence.     KAYLYN 3/16/2022:   EF 40%.   Moderate global hypokinesis.  Mildly reduced RV function.   Mildly dilated left and right atrium.  No MOLLY thrombus.   Successful cardioversion at 200J to normal sinus rhythm.      Echocardiogram 8/5/2020:  Left ventricle is mildly dilated.  EF 25-30%.  Diffuse hypokinesis.  Dilated right ventricle with reduced right ventricular systolic function.  Moderately dilated left atrium.  Moderately dilated right atrium.  Moderate mitral regurgitation.  Mild-to-moderate tricuspid regurgitation.  Estimated PASP 35 mmHg.    Review of Systems   Constitutional: Negative.   HENT: Negative.     Cardiovascular:  Positive for dyspnea on exertion. Negative for chest pain, irregular heartbeat, leg swelling, near-syncope, orthopnea and palpitations.   Respiratory:  Negative for cough and snoring.    Endocrine: Negative.    Skin: Negative.    Musculoskeletal: Negative.    Gastrointestinal: Negative.    Genitourinary: Negative.    Neurological: Negative.    Psychiatric/Behavioral: Negative.         Vitals:    08/07/24 0845   BP: 118/80   Pulse: 57   Temp: (!) 97.2 °F (36.2 °C)   SpO2: 97%     Vitals:    08/07/24 0845   Weight: 93.8 kg (206 lb 12.8 oz)     Height: 6' 1\" (185.4 cm)   Body mass index is 27.28 kg/m².    Physical Exam  Vitals and nursing note reviewed.   Constitutional:       General: He is not in acute distress.     Appearance: He is well-developed. He is not diaphoretic.   HENT:      Head: Normocephalic and atraumatic.   Neck:      Vascular: No carotid bruit or JVD.   Cardiovascular:      Rate and Rhythm: Regular rhythm. Bradycardia present.      " Pulses: Intact distal pulses.      Heart sounds: Normal heart sounds, S1 normal and S2 normal. No murmur heard.     No friction rub. No gallop.      Comments: No edema  Pulmonary:      Effort: Pulmonary effort is normal. No respiratory distress.      Breath sounds: Normal breath sounds.   Abdominal:      General: There is no distension.      Palpations: Abdomen is soft.      Tenderness: There is no abdominal tenderness.   Skin:     General: Skin is warm and dry.      Findings: No rash.   Neurological:      Mental Status: He is alert and oriented to person, place, and time.   Psychiatric:         Mood and Affect: Mood and affect normal.         Behavior: Behavior normal.

## 2024-08-07 NOTE — PATIENT INSTRUCTIONS
Your EKG shows normal sinus rhythm!  We will get an updated echocardiogram to reassess your heart structure and function.  I do recommend using your albuterol inhaler prior to exercise as you do have mild obstructive disease on your pulmonary function test.   Please complete blood work

## 2024-08-07 NOTE — ASSESSMENT & PLAN NOTE
New diagnosis of moderate sleep apnea by home sleep study 5/4/2022.  We reviewed importance of sleep apnea management in the setting of atrial fibrillation.  He has been given the contact info for sleep medicine and again encouraged to schedule.

## 2024-08-07 NOTE — ASSESSMENT & PLAN NOTE
Patient with prior history of tachycardia induced cardiomyopathy with prior EF of 20% that improved to 62% post cardioversion in 2018.  EF 8/5/2020 25-30%.  KAYLYN 9/29/2021 with EF 25-30%.  KAYLYN 3/16/2022 EF 40%.   There could also be some component of ETOH induced cardiomyopathy.   Continue cardiomyopathy specific metoprolol succinate 100 mg BID.  Echocardiogram 10/27/2022 showed an EF of 60%.   Patient has noted significant improvement in his symptoms with restoration/maintenance of normal sinus rhythm.   Repeat echo at this time for serial monitoring. He does report some dyspnea on exertion which is sporadic.

## 2024-08-07 NOTE — ASSESSMENT & PLAN NOTE
Lab Results   Component Value Date    EGFR 75 03/16/2023    EGFR 71 06/29/2022    EGFR 50 06/28/2022    CREATININE 1.1 03/16/2023    CREATININE 1.13 06/29/2022    CREATININE 1.50 (H) 06/28/2022     Updated lab work ordered.  Avoiding nephrotoxic agents.

## 2024-08-07 NOTE — LETTER
August 7, 2024     Patient: Avtar Hernandez  YOB: 1965  Date of Visit: 8/7/2024      To Whom it May Concern:    Avtar Hernandez is under my professional care. Avtar was seen in my office on 8/7/2024. Avtar may return to work on 8/7/2024 .    If you have any questions or concerns, please don't hesitate to call.         Sincerely,          CHANTAL Tinsley

## 2024-08-07 NOTE — ASSESSMENT & PLAN NOTE
Lipid panel 8/4/2020: . . HDL 40. LDL 80.  Currently on atorvastatin 40mg daily.  Given order for updated lipid panel.

## 2024-08-13 ENCOUNTER — HOSPITAL ENCOUNTER (OUTPATIENT)
Dept: NON INVASIVE DIAGNOSTICS | Facility: HOSPITAL | Age: 59
Discharge: HOME | End: 2024-08-13
Payer: COMMERCIAL

## 2024-08-13 VITALS
DIASTOLIC BLOOD PRESSURE: 80 MMHG | HEIGHT: 73 IN | HEART RATE: 74 BPM | SYSTOLIC BLOOD PRESSURE: 118 MMHG | BODY MASS INDEX: 27.3 KG/M2 | WEIGHT: 206 LBS

## 2024-08-13 DIAGNOSIS — I42.9 CARDIOMYOPATHY, UNSPECIFIED TYPE (HCC): ICD-10-CM

## 2024-08-13 DIAGNOSIS — I50.22 CHRONIC SYSTOLIC CONGESTIVE HEART FAILURE (HCC): ICD-10-CM

## 2024-08-13 LAB
AORTIC ROOT: 3.7 CM
APICAL FOUR CHAMBER EJECTION FRACTION: 73 %
ASCENDING AORTA: 3.6 CM
BSA FOR ECHO PROCEDURE: 2.18 M2
E WAVE DECELERATION TIME: 276 MS
E/A RATIO: 0.76
FRACTIONAL SHORTENING: 35 (ref 28–44)
INTERVENTRICULAR SEPTUM IN DIASTOLE (PARASTERNAL SHORT AXIS VIEW): 0.9 CM
INTERVENTRICULAR SEPTUM: 0.9 CM (ref 0.6–1.1)
LAAS-AP2: 21.9 CM2
LAAS-AP4: 23.8 CM2
LEFT ATRIUM SIZE: 3.9 CM
LEFT ATRIUM VOLUME (MOD BIPLANE): 68 ML
LEFT ATRIUM VOLUME INDEX (MOD BIPLANE): 31.2 ML/M2
LEFT INTERNAL DIMENSION IN SYSTOLE: 3.2 CM (ref 2.1–4)
LEFT VENTRICLE DIASTOLIC VOLUME (MOD BIPLANE): 56 ML
LEFT VENTRICLE DIASTOLIC VOLUME INDEX (MOD BIPLANE): 25.7 ML/M2
LEFT VENTRICLE SYSTOLIC VOLUME (MOD BIPLANE): 16 ML
LEFT VENTRICLE SYSTOLIC VOLUME INDEX (MOD BIPLANE): 7.3 ML/M2
LEFT VENTRICULAR INTERNAL DIMENSION IN DIASTOLE: 4.9 CM (ref 3.5–6)
LEFT VENTRICULAR POSTERIOR WALL IN END DIASTOLE: 1.1 CM
LEFT VENTRICULAR STROKE VOLUME: 72 ML
LV EF: 72 %
LVSV (TEICH): 72 ML
MV E'TISSUE VEL-LAT: 9 CM/S
MV E'TISSUE VEL-SEP: 6 CM/S
MV PEAK A VEL: 0.68 M/S
MV PEAK E VEL: 52 CM/S
MV STENOSIS PRESSURE HALF TIME: 80 MS
MV VALVE AREA P 1/2 METHOD: 2.8
RIGHT ATRIUM AREA SYSTOLE A4C: 17.2 CM2
RIGHT VENTRICLE ID DIMENSION: 3.8 CM
SL CV LEFT ATRIUM LENGTH A2C: 6.2 CM
SL CV PED ECHO LEFT VENTRICLE DIASTOLIC VOLUME (MOD BIPLANE) 2D: 112 ML
SL CV PED ECHO LEFT VENTRICLE SYSTOLIC VOLUME (MOD BIPLANE) 2D: 39 ML
TR MAX PG: 35 MMHG
TR PEAK VELOCITY: 3 M/S
TRICUSPID ANNULAR PLANE SYSTOLIC EXCURSION: 2.5 CM
TRICUSPID VALVE PEAK REGURGITATION VELOCITY: 2.96 M/S

## 2024-08-13 PROCEDURE — 93306 TTE W/DOPPLER COMPLETE: CPT

## 2024-08-13 PROCEDURE — 93306 TTE W/DOPPLER COMPLETE: CPT | Performed by: INTERNAL MEDICINE

## 2024-08-14 ENCOUNTER — TELEPHONE (OUTPATIENT)
Dept: CARDIOLOGY CLINIC | Facility: CLINIC | Age: 59
End: 2024-08-14

## 2024-08-14 NOTE — TELEPHONE ENCOUNTER
Spoke with Pt. He is aware. He does not see pulmonary. His PCP treats his asthma. He stated he is going to call his PCP for them to refer him to a pulmonary doctor.

## 2024-08-14 NOTE — TELEPHONE ENCOUNTER
----- Message from CHANTAL Maldonado sent at 8/14/2024 12:37 PM EDT -----  Please let Americo know his echo continues to show normal heart function! There is possible mild increased pressure coming from the lungs and I recommend he follow up with the lung doctor due to his asthma/COPD history.  Thank you!

## 2025-02-13 ENCOUNTER — OFFICE VISIT (OUTPATIENT)
Dept: CARDIOLOGY CLINIC | Facility: CLINIC | Age: 60
End: 2025-02-13
Payer: COMMERCIAL

## 2025-02-13 ENCOUNTER — RESULTS FOLLOW-UP (OUTPATIENT)
Dept: CARDIOLOGY CLINIC | Facility: CLINIC | Age: 60
End: 2025-02-13

## 2025-02-13 ENCOUNTER — APPOINTMENT (OUTPATIENT)
Dept: LAB | Facility: HOSPITAL | Age: 60
End: 2025-02-13
Payer: COMMERCIAL

## 2025-02-13 VITALS
TEMPERATURE: 98.2 F | BODY MASS INDEX: 26.16 KG/M2 | WEIGHT: 197.4 LBS | HEIGHT: 73 IN | SYSTOLIC BLOOD PRESSURE: 128 MMHG | DIASTOLIC BLOOD PRESSURE: 80 MMHG

## 2025-02-13 DIAGNOSIS — E78.2 MIXED HYPERLIPIDEMIA: ICD-10-CM

## 2025-02-13 DIAGNOSIS — I42.9 CARDIOMYOPATHY, UNSPECIFIED TYPE (HCC): ICD-10-CM

## 2025-02-13 DIAGNOSIS — R06.2 WHEEZING: ICD-10-CM

## 2025-02-13 DIAGNOSIS — I10 BENIGN ESSENTIAL HTN: ICD-10-CM

## 2025-02-13 DIAGNOSIS — I48.0 PAROXYSMAL ATRIAL FIBRILLATION (HCC): Primary | ICD-10-CM

## 2025-02-13 DIAGNOSIS — I50.22 CHRONIC SYSTOLIC CONGESTIVE HEART FAILURE (HCC): ICD-10-CM

## 2025-02-13 DIAGNOSIS — G47.33 OSA (OBSTRUCTIVE SLEEP APNEA): ICD-10-CM

## 2025-02-13 DIAGNOSIS — I48.0 PAROXYSMAL ATRIAL FIBRILLATION (HCC): ICD-10-CM

## 2025-02-13 DIAGNOSIS — N18.32 STAGE 3B CHRONIC KIDNEY DISEASE (HCC): ICD-10-CM

## 2025-02-13 PROBLEM — R79.89 ELEVATED TROPONIN: Status: RESOLVED | Noted: 2020-08-04 | Resolved: 2025-02-13

## 2025-02-13 LAB
ALBUMIN SERPL BCG-MCNC: 4.3 G/DL (ref 3.5–5)
ALP SERPL-CCNC: 73 U/L (ref 34–104)
ALT SERPL W P-5'-P-CCNC: 24 U/L (ref 7–52)
ANION GAP SERPL CALCULATED.3IONS-SCNC: 3 MMOL/L (ref 4–13)
AST SERPL W P-5'-P-CCNC: 23 U/L (ref 13–39)
BILIRUB SERPL-MCNC: 0.57 MG/DL (ref 0.2–1)
BUN SERPL-MCNC: 16 MG/DL (ref 5–25)
CALCIUM SERPL-MCNC: 9.5 MG/DL (ref 8.4–10.2)
CHLORIDE SERPL-SCNC: 105 MMOL/L (ref 96–108)
CHOLEST SERPL-MCNC: 161 MG/DL (ref ?–200)
CO2 SERPL-SCNC: 33 MMOL/L (ref 21–32)
CREAT SERPL-MCNC: 1.19 MG/DL (ref 0.6–1.3)
ERYTHROCYTE [DISTWIDTH] IN BLOOD BY AUTOMATED COUNT: 12.1 % (ref 11.6–15.1)
GFR SERPL CREATININE-BSD FRML MDRD: 65 ML/MIN/1.73SQ M
GLUCOSE SERPL-MCNC: 84 MG/DL (ref 65–140)
HCT VFR BLD AUTO: 45.3 % (ref 36.5–49.3)
HDLC SERPL-MCNC: 52 MG/DL
HGB BLD-MCNC: 15.3 G/DL (ref 12–17)
LDLC SERPL CALC-MCNC: 89 MG/DL (ref 0–100)
MCH RBC QN AUTO: 30.8 PG (ref 26.8–34.3)
MCHC RBC AUTO-ENTMCNC: 33.8 G/DL (ref 31.4–37.4)
MCV RBC AUTO: 91 FL (ref 82–98)
PLATELET # BLD AUTO: 296 THOUSANDS/UL (ref 149–390)
PMV BLD AUTO: 9.9 FL (ref 8.9–12.7)
POTASSIUM SERPL-SCNC: 4.4 MMOL/L (ref 3.5–5.3)
PROT SERPL-MCNC: 7.6 G/DL (ref 6.4–8.4)
RBC # BLD AUTO: 4.97 MILLION/UL (ref 3.88–5.62)
SODIUM SERPL-SCNC: 141 MMOL/L (ref 135–147)
TRIGL SERPL-MCNC: 102 MG/DL (ref ?–150)
WBC # BLD AUTO: 7.91 THOUSAND/UL (ref 4.31–10.16)

## 2025-02-13 PROCEDURE — 36415 COLL VENOUS BLD VENIPUNCTURE: CPT

## 2025-02-13 PROCEDURE — 99214 OFFICE O/P EST MOD 30 MIN: CPT | Performed by: NURSE PRACTITIONER

## 2025-02-13 PROCEDURE — 80061 LIPID PANEL: CPT

## 2025-02-13 PROCEDURE — 80053 COMPREHEN METABOLIC PANEL: CPT

## 2025-02-13 PROCEDURE — 93000 ELECTROCARDIOGRAM COMPLETE: CPT | Performed by: NURSE PRACTITIONER

## 2025-02-13 PROCEDURE — 85027 COMPLETE CBC AUTOMATED: CPT

## 2025-02-13 NOTE — ASSESSMENT & PLAN NOTE
PFT's reveal mild obstructive disease.  He was referred to pulmonology but did not go.  Now on Singulair through PCP  Asked pt to follow up with PCP given ongoing wheezing on exam with mildly elevated pulmonary pressure on echo. Would benefit from pulmonary follow up.

## 2025-02-13 NOTE — ASSESSMENT & PLAN NOTE
Lipid panel 8/4/2020: . . HDL 40. LDL 80.  Currently on atorvastatin 40mg daily.  Given order for updated lipid panel and asked him to complete today.

## 2025-02-13 NOTE — PATIENT INSTRUCTIONS
Please go to the lab today.  EKG continues to show sinus rhythm  Please continue your medications with no missed doses  Echo shows normal heart function.

## 2025-02-13 NOTE — ASSESSMENT & PLAN NOTE
Patient with prior history of tachycardia induced cardiomyopathy with prior EF of 20% that improved to 62% post cardioversion in 2018.  EF 8/5/2020 25-30%.  KAYLYN 9/29/2021 with EF 25-30%.  KAYLYN 3/16/2022 EF 40%.   There could also be some component of ETOH induced cardiomyopathy.   Continue cardiomyopathy specific metoprolol succinate 100 mg BID.  Echocardiogram 8/2024 with EF 65%.  Patient has noted significant improvement in his symptoms with restoration/maintenance of normal sinus rhythm.

## 2025-02-13 NOTE — ASSESSMENT & PLAN NOTE
Wt Readings from Last 3 Encounters:   02/13/25 89.5 kg (197 lb 6.4 oz)   08/13/24 93.4 kg (206 lb)   08/07/24 93.8 kg (206 lb 12.8 oz)     Appears euvolemic.  Not on diuretic therapy at present.  Reviewed 2g sodium diet, 2L fluid restriction and need for daily weights.  He will report 3 pound weight gain in 1 day/5 pound weight gain in 1 week, shortness of breath, leg swelling, or any other concerns.  Will continue to monitor.

## 2025-02-13 NOTE — ASSESSMENT & PLAN NOTE
Lab Results   Component Value Date    EGFR 75 03/16/2023    EGFR 71 06/29/2022    EGFR 50 06/28/2022    CREATININE 1.1 03/16/2023    CREATININE 1.13 06/29/2022    CREATININE 1.50 (H) 06/28/2022     Updated lab work ordered. Urged him to complete today.  Avoiding nephrotoxic agents.

## 2025-02-13 NOTE — PROGRESS NOTES
Cardiology Follow Up    Avtar Hernandez  1965  [id number]  [facility] [place] [address]  [address]  [facility] [address] [phone]    Avtar presents for follow up of atrial fibrillation, non-ischemic cardiomyopathy, heart failure with recovered EF, hypertension and hyperlipidemia.      1. Paroxysmal atrial fibrillation (HCC)  Assessment & Plan:  History of atrial fibrillation in 2018 for which he underwent cardioversion 03/2018.  Tachycardia induced cardiomyopathy noted at that time with an EF of 20% which subsequently improved after cardioversion to 62%.  Patient underwent repeat KAYLYN guided cardioversion 08/06/2020 with successful conversion to normal sinus rhythm.  He presented back to [facility] ER on 9/24/2021 in A fib with RVR.  KAYLYN 9/29/21 showed left atrial appendage thrombus and cardioversion was aborted. Estimated EF during KAYLYN at 25-30%.  He remained on Eliquis 5 mg BID uninterrupted and KAYLYN was performed 11/10/2021 and 2/2/2022 with continued evidence of MOLLY thrombus. Cardioversion was aborted x 2.  Reviewed last KAYLYN images with Dr. Puga who felt artifact was present without thrombus.  Successful cardioversion 3/16/2022.  ECG 4/19/2022 shows atrial fibrillation with RVR ( bpm).  Sleep study done 5/2022 showed moderate obstructive sleep apnea and significant hypoxia, referred to sleep medicine with pending evaluation.  Patient did undergo atrial fibrillation ablation using cryotherapy 06/28/2022.  Patient was maintained on amiodarone for 3 months postprocedure and was stopped 11/11/2022.  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   ECG today shows NSR.  Orders:  -     POCT ECG  -     CBC and Platelet; Future  2. Cardiomyopathy, unspecified type (HCC)  Assessment & Plan:  Patient with prior history of tachycardia induced cardiomyopathy with prior EF of 20% that improved to 62%  post cardioversion in 2018.  EF 8/5/2020 25-30%.  KAYLYN 9/29/2021 with EF 25-30%.  KAYLYN 3/16/2022 EF 40%.   There could also be some component of ETOH induced cardiomyopathy.   Continue cardiomyopathy specific metoprolol succinate 100 mg BID.  Echocardiogram 8/2024 with EF 65%.  Patient has noted significant improvement in his symptoms with restoration/maintenance of normal sinus rhythm.   Orders:  -     CBC and Platelet; Future  3. Chronic systolic congestive heart failure (HCC)  Assessment & Plan:    Wt Readings from Last 3 Encounters:   02/13/25 89.5 kg (197 lb 6.4 oz)   08/13/24 93.4 kg (206 lb)   08/07/24 93.8 kg (206 lb 12.8 oz)     Appears euvolemic.  Not on diuretic therapy at present.  Reviewed 2g sodium diet, 2L fluid restriction and need for daily weights.  He will report 3 pound weight gain in 1 day/5 pound weight gain in 1 week, shortness of breath, leg swelling, or any other concerns.  Will continue to monitor.  Orders:  -     CBC and Platelet; Future  -     Comprehensive metabolic panel; Future; Expected date: 02/13/2025  4. Benign essential HTN  Assessment & Plan:  Blood pressure is at goal.  Continue metoprolol succinate 100 mg BID.  Updated lab work ordered.  5. Mixed hyperlipidemia  Assessment & Plan:  Lipid panel 8/4/2020: . . HDL 40. LDL 80.  Currently on atorvastatin 40mg daily.  Given order for updated lipid panel and asked him to complete today.  Orders:  -     Lipid Panel with Direct LDL reflex; Future; Expected date: 02/13/2025  6. Stage 3b chronic kidney disease (HCC)  Assessment & Plan:  Lab Results   Component Value Date    EGFR 75 03/16/2023    EGFR 71 06/29/2022    EGFR 50 06/28/2022    CREATININE 1.1 03/16/2023    CREATININE 1.13 06/29/2022    CREATININE 1.50 (H) 06/28/2022     Updated lab work ordered. Urged him to complete today.  Avoiding nephrotoxic agents.  7. ALVARO (obstructive sleep apnea)  Assessment & Plan:  New diagnosis of moderate sleep apnea by home sleep study  5/4/2022.  We reviewed importance of sleep apnea management in the setting of atrial fibrillation.  He has been given the contact info for sleep medicine and again encouraged to schedule.  8. Wheezing  Assessment & Plan:  PFT's reveal mild obstructive disease.  He was referred to pulmonology but did not go.  Now on Singulair through PCP  Asked pt to follow up with PCP given ongoing wheezing on exam with mildly elevated pulmonary pressure on echo. Would benefit from pulmonary follow up.       THOMAS Nova has a past medical history of paroxysmal atrial fibrillation, cardiomyopathy (possibly tachycardia induced), heart failure with recovered EF, HTN, HLD.     A fib history dates back to 2018 at which time his EF was noted to be 20%. He was referred to [facility] for cardiac catheterization on 2/22/2018 which showed normal coronary arteries. He underwent cardioversion on 3/2018 and was started on amiodarone. Repeat echo 8/2018 showed improved EF to 62%. He lost his insurance and was lost to follow up.     He presented to his PCP office in 2020 for evaluation after a fall and was found to be in A fib with RVR and directed to the [facility] ER where he was admitted. Echo during that admission revealed EF 25-30%. He underwent successful KAYLYN guided cardioversion on 8/6/2020 and was started on amiodarone loading, Eliquis, and metoprolol succinate. He was then lost to follow up again.     Admitted to [facility] 9/24-9/30/2021 for a fib with RVR when he presented with fatigue and shortness of breath x 2 weeks that felt similar to prior a fib episodes. He was without medical insurance and had run out of his medications. EF of 25-30% by KAYLYN with possible MOLLY thrombus. No cardioversion. Diltiazem was used for rate control following discussion with EP as suspected tachy-mediated cardiomyopathy. Metoprolol succinate resumed and titrated. Digoxin added. Eliquis samples provided.     He met with Dr. Krause on 5/25/2022 and underwent atrial  fibrillation cryoablation with left atrial posterior wall isolation on 6/28/2022. Amiodarone was continued for 3 months and then stopped.      Echocardiogram 10/27/2022 showed recovered LVEF of 60%.     He followed up 8/7/2024 after not being seen for over a year. ECG showed SB at 56 bpm. Updated echo showed LVEF 65% with mildly elevated RSVP.      2/13/2025: Avtar presents for routine follow up. We reviewed his echo results. He is overdue for labs which I asked him to complete today. He is doing well. Continues to work. Denies activity intolerance. No chest pain, palpitations, or edema. He denies smoking. He does wheeze and uses albuterol occasionally but has not followed up on his COPD    Medical Problems       Problem List       Elevated troponin    Abnormal TSH    Paroxysmal atrial fibrillation (HCC)    Cardiomyopathy (HCC)    Chronic systolic congestive heart failure (HCC)    Benign essential HTN    Mixed hyperlipidemia    ALVARO (obstructive sleep apnea)    Stage 3b chronic kidney disease (HCC)    Wheezing        Past Medical History:   Diagnosis Date    Atrial fibrillation (HCC)     Hyperlipidemia     Hypertension     Overweight     Systolic heart failure (HCC)     Tachycardia induced cardiomyopathy (HCC)      Social History     Socioeconomic History    Marital status: Single     Spouse name: Not on file    Number of children: Not on file    Years of education: Not on file    Highest education level: Not on file   Occupational History    Not on file   Tobacco Use    Smoking status: Former     Types: Cigarettes    Smokeless tobacco: Never   Vaping Use    Vaping status: Never Used   Substance and Sexual Activity    Alcohol use: Not Currently     Comment: none currently    Drug use: Never    Sexual activity: Yes     Comment: Defer   Other Topics Concern    Not on file   Social History Narrative    Not on file     Social Drivers of Health     Financial Resource Strain: High Risk (8/5/2020)    Overall Financial  Resource Strain (CARDIA)     Difficulty of Paying Living Expenses: Hard   Food Insecurity: No Food Insecurity (3/22/2023)    Received from [facility] [facility]    Hunger Vital Sign     Worried About Running Out of Food in the Last Year: Never true     Ran Out of Food in the Last Year: Never true   Transportation Needs: Not on file   Physical Activity: Not on file   Stress: Not on file   Social Connections: Unknown (6/18/2024)    Received from [facility]    Social Connections     How often do you feel lonely or isolated from those around you? (Adult - for ages 18 years and over): Not on file   Intimate Partner Violence: Not on file   Housing Stability: Not on file      Family History   Problem Relation Age of Onset    No Known Problems Mother     Colon cancer Father      Past Surgical History:   Procedure Laterality Date    CARDIAC CATHETERIZATION  02/22/2018    CARDIAC ELECTROPHYSIOLOGY PROCEDURE N/A 6/28/2022    Procedure: Cardiac eps/afib ablation post wall;  Surgeon: Ruslan Krause MD;  Location: [facility];  Service: Cardiology    CARDIOVERSION  03/2018    CARDIOVERSION  08/06/2020    INGUINAL HERNIA REPAIR      SINUS SURGERY      WISDOM TOOTH EXTRACTION         Current Outpatient Medications:     albuterol (Proventil HFA) 90 mcg/act inhaler, Inhale 2 puffs every 4 (four) hours as needed for wheezing, Disp: 6.7 g, Rfl: 0    apixaban (Eliquis) 5 mg, Take 1 tablet (5 mg total) by mouth 2 (two) times a day, Disp: 180 tablet, Rfl: 3    atorvastatin (LIPITOR) 40 mg tablet, Take 1 tablet (40 mg total) by mouth daily, Disp: 90 tablet, Rfl: 3    magnesium Oxide (MAG-OX) 400 mg TABS, Take 1 tablet (400 mg total) by mouth 2 (two) times a day, Disp: 180 tablet, Rfl: 3    metoprolol succinate (TOPROL-XL) 100 mg 24 hr tablet, Take 1 tablet (100 mg total) by mouth 2 (two) times a day, Disp: 180 tablet, Rfl: 3    montelukast (SINGULAIR) 10 mg tablet, Take 10 mg by mouth daily at bedtime, Disp: , Rfl:     rOPINIRole (REQUIP) 0.5  mg tablet, Take 0.5 mg by mouth daily, Disp: , Rfl:   No Known Allergies    Labs:     Chemistry        Component Value Date/Time    K 4.4 03/16/2023 1346     03/16/2023 1346    CO2 28 03/16/2023 1346    BUN 18 03/16/2023 1346    BUN 18 01/26/2018 0352    CREATININE 1.1 03/16/2023 1346        Component Value Date/Time    CALCIUM 9.5 03/16/2023 1346    ALKPHOS 84 06/23/2022 1703    ALKPHOS 105 01/24/2018 0435    AST 21 06/23/2022 1703    AST 30 01/24/2018 0435    ALT 33 06/23/2022 1703    ALT 53 01/24/2018 0435        Cardiac testing:  ECG 2/13/2025: Normal sinus rhythm with nonspecific ST/T wave abnormality. Rate 72 bpm.     Echo 8/13/2024:  LVEF 65%. Grade 1 DD.  MAC with mild MR. Mild TR with PASP 38mmHg.   Ascending aorta 3.7 cm.     ECG 8/7/2024: Sinus bradycardia at 56 bpm. Otherwise normal ECG.     Echocardiogram 10/27/2022:  EF 60%. Grade 1 DD. Biatrial dilation. RV dilation.  Trace MR. Mild TR. PASP 54mmHg. Trace MS.  Aoric root 3.8 cm, ascending aorta 3.3 cm.     Nuclear stress test 5/24/2022:  Perfusion: Normal post stress perfusion. No definitive signs of ischemia on nuclear images.    Stress Function: Calculated LVEF 46%. Pt with AFIB during study with numerous beats elimated from image aquisition. Given irregular cardiac intervals with AFIB, suggest ECHO for better LVEF evaluation.    Deconditioned HR responce to exercise.  Poor exercise tolerence.     KAYLYN 3/16/2022:   EF 40%.   Moderate global hypokinesis.  Mildly reduced RV function.   Mildly dilated left and right atrium.  No MOLLY thrombus.   Successful cardioversion at 200J to normal sinus rhythm.      Echocardiogram 8/5/2020:  Left ventricle is mildly dilated.  EF 25-30%.  Diffuse hypokinesis.  Dilated right ventricle with reduced right ventricular systolic function.  Moderately dilated left atrium.  Moderately dilated right atrium.  Moderate mitral regurgitation.  Mild-to-moderate tricuspid regurgitation.  Estimated PASP 35 mmHg.    Review of  "Systems   Constitutional: Negative.   HENT: Negative.     Cardiovascular:  Negative for chest pain, dyspnea on exertion, irregular heartbeat, leg swelling, near-syncope, orthopnea and palpitations.   Respiratory:  Positive for wheezing. Negative for cough and snoring.    Endocrine: Negative.    Skin: Negative.    Musculoskeletal: Negative.    Gastrointestinal: Negative.    Genitourinary: Negative.    Neurological: Negative.    Psychiatric/Behavioral: Negative.         Vitals:    02/13/25 1442   BP: 128/80   Temp: 98.2 °F (36.8 °C)     Vitals:    02/13/25 1442   Weight: 89.5 kg (197 lb 6.4 oz)     Height: 6' 1\" (185.4 cm)   Body mass index is 26.04 kg/m².    Physical Exam  Vitals and nursing note reviewed.   Constitutional:       General: He is not in acute distress.     Appearance: He is well-developed. He is not diaphoretic.   HENT:      Head: Normocephalic and atraumatic.   Neck:      Vascular: No carotid bruit or JVD.   Cardiovascular:      Rate and Rhythm: Normal rate and regular rhythm. No extrasystoles are present.     Pulses: Intact distal pulses.      Heart sounds: Normal heart sounds, S1 normal and S2 normal. No murmur heard.     No friction rub. No gallop.      Comments: No edema  Pulmonary:      Effort: Pulmonary effort is normal. No respiratory distress.      Breath sounds: Examination of the right-middle field reveals wheezing. Wheezing present.   Abdominal:      General: There is no distension.      Palpations: Abdomen is soft.      Tenderness: There is no abdominal tenderness.   Skin:     General: Skin is warm and dry.      Findings: No rash.   Neurological:      Mental Status: He is alert and oriented to person, place, and time.   Psychiatric:         Behavior: Behavior normal.                "

## 2025-02-13 NOTE — ASSESSMENT & PLAN NOTE
History of atrial fibrillation in 2018 for which he underwent cardioversion 03/2018.  Tachycardia induced cardiomyopathy noted at that time with an EF of 20% which subsequently improved after cardioversion to 62%.  Patient underwent repeat KAYLYN guided cardioversion 08/06/2020 with successful conversion to normal sinus rhythm.  He presented back to [facility] ER on 9/24/2021 in A fib with RVR.  KAYLYN 9/29/21 showed left atrial appendage thrombus and cardioversion was aborted. Estimated EF during KAYLYN at 25-30%.  He remained on Eliquis 5 mg BID uninterrupted and KAYLYN was performed 11/10/2021 and 2/2/2022 with continued evidence of MOLLY thrombus. Cardioversion was aborted x 2.  Reviewed last KAYLYN images with Dr. Puga who felt artifact was present without thrombus.  Successful cardioversion 3/16/2022.  ECG 4/19/2022 shows atrial fibrillation with RVR ( bpm).  Sleep study done 5/2022 showed moderate obstructive sleep apnea and significant hypoxia, referred to sleep medicine with pending evaluation.  Patient did undergo atrial fibrillation ablation using cryotherapy 06/28/2022.  Patient was maintained on amiodarone for 3 months postprocedure and was stopped 11/11/2022.  Patient feels well without palpitations and is working on the [employer] again a few days a week.   ECG today shows NSR.

## 2025-03-26 NOTE — TELEPHONE ENCOUNTER
Patient scheduled for KAYLYN/Afib ablation on 6/28/22 in [facility] with Dr Araujo Human  Instructions given to patient in office  Medication hold Eliquis  Patient aware COVID test to be done on 6/23/22  Can I have auth? Yes

## 2025-06-05 NOTE — PROGRESS NOTES
Home Sleep Study Documentation    Pre-Sleep Home Study:    Set-up and instructions performed by: KATRIN Santiago    Technician performed demonstration for Patient: yes    Return demonstration performed by Patient: yes    Written instructions provided to Patient: yes    Patient signed consent form: yes        Post-Sleep Home Study:    Additional comments by Patient: None    Home Sleep Study Failed:no:    Failure reason: N/A    Reported or Detected: N/A    Scored by: HANNY Alejandro RPSGT [id number]

## 2025-08-13 ENCOUNTER — OFFICE VISIT (OUTPATIENT)
Dept: CARDIOLOGY CLINIC | Facility: CLINIC | Age: 60
End: 2025-08-13
Payer: COMMERCIAL

## 2025-08-13 PROBLEM — I50.32 HEART FAILURE WITH RECOVERED EJECTION FRACTION (HFRECEF) (HCC): Status: ACTIVE | Noted: 2020-08-04

## (undated) DEVICE — CATH COAXIAL UMBILICAL

## (undated) DEVICE — INTRO SHEATH 8FR 24CM ARROW-FLEX

## (undated) DEVICE — Device: Brand: PROTRACK PIGTAIL WIRE

## (undated) DEVICE — CATH ABLATION CRYOCATH ARCTIC FRONT ADV PRO 28MM

## (undated) DEVICE — PINNACLE INTRODUCER SHEATH: Brand: PINNACLE

## (undated) DEVICE — REF PATCH ENSITE X

## (undated) DEVICE — CABLE CRYOCATH ELECTRICAL UMBILICAL

## (undated) DEVICE — NEEDLE TRANSSEPTAL BRK-1 71CM

## (undated) DEVICE — GUIDE SHEATH SLO 8.5 FR

## (undated) DEVICE — CATH ABLATION FLEXCATH ADVANCE OD12 FR STEERABLE

## (undated) DEVICE — TUBING SET COOL POINT

## (undated) DEVICE — CATH ABLATION TACTICATH SE BI-DIR FJ CRV

## (undated) DEVICE — CABLE CATHETER ACHIEVE MAPPING

## (undated) DEVICE — CATH EP 7FR DI-DIR 10-POLE DEFL CS 2-8-2 FJ

## (undated) DEVICE — CATH ULTRASOUND ACUNAV ICE 8FR 90CM GE VIVID-I

## (undated) DEVICE — DGW .035 FC J3MM 150CM TEF: Brand: EMERALD

## (undated) DEVICE — CATH EP ACHIEVE 20 MM MAPPING LOOP